# Patient Record
Sex: FEMALE | Race: BLACK OR AFRICAN AMERICAN | Employment: OTHER | URBAN - METROPOLITAN AREA
[De-identification: names, ages, dates, MRNs, and addresses within clinical notes are randomized per-mention and may not be internally consistent; named-entity substitution may affect disease eponyms.]

---

## 2023-03-28 LAB
ALANINE AMINOTRANSFERASE (SGPT) (U/L) IN SER/PLAS: 8 U/L (ref 7–45)
ALBUMIN (G/DL) IN SER/PLAS: 3.7 G/DL (ref 3.4–5)
ALKALINE PHOSPHATASE (U/L) IN SER/PLAS: 74 U/L (ref 33–110)
ANION GAP IN SER/PLAS: 13 MMOL/L (ref 10–20)
ASPARTATE AMINOTRANSFERASE (SGOT) (U/L) IN SER/PLAS: 9 U/L (ref 9–39)
BILIRUBIN TOTAL (MG/DL) IN SER/PLAS: 0.3 MG/DL (ref 0–1.2)
CALCIUM (MG/DL) IN SER/PLAS: 9.1 MG/DL (ref 8.6–10.6)
CARBON DIOXIDE, TOTAL (MMOL/L) IN SER/PLAS: 24 MMOL/L (ref 21–32)
CHLORIDE (MMOL/L) IN SER/PLAS: 99 MMOL/L (ref 98–107)
CREATININE (MG/DL) IN SER/PLAS: 0.51 MG/DL (ref 0.5–1.05)
CREATININE (MG/DL) IN URINE: 179 MG/DL (ref 20–320)
ESTIMATED AVERAGE GLUCOSE FOR HBA1C: 214 MG/DL
GFR FEMALE: >90 ML/MIN/1.73M2
GLUCOSE (MG/DL) IN SER/PLAS: 217 MG/DL (ref 74–99)
HEMOGLOBIN A1C/HEMOGLOBIN TOTAL IN BLOOD: 9.1 %
POTASSIUM (MMOL/L) IN SER/PLAS: 3.9 MMOL/L (ref 3.5–5.3)
PROTEIN (MG/DL) IN URINE: 34 MG/DL (ref 5–24)
PROTEIN TOTAL: 6.7 G/DL (ref 6.4–8.2)
PROTEIN/CREATININE (MG/MG) IN URINE: 0.19 MG/MG CREAT (ref 0–0.17)
SODIUM (MMOL/L) IN SER/PLAS: 132 MMOL/L (ref 136–145)
UREA NITROGEN (MG/DL) IN SER/PLAS: 6 MG/DL (ref 6–23)

## 2023-05-15 ENCOUNTER — HOSPITAL ENCOUNTER (OUTPATIENT)
Dept: DATA CONVERSION | Facility: HOSPITAL | Age: 23
End: 2023-05-15
Attending: OBSTETRICS & GYNECOLOGY

## 2023-05-15 DIAGNOSIS — O99.413 DISEASES OF THE CIRCULATORY SYSTEM COMPLICATING PREGNANCY, THIRD TRIMESTER (HHS-HCC): ICD-10-CM

## 2023-05-15 DIAGNOSIS — F41.9 ANXIETY DISORDER, UNSPECIFIED: ICD-10-CM

## 2023-05-15 DIAGNOSIS — Z3A.28 28 WEEKS GESTATION OF PREGNANCY (HHS-HCC): ICD-10-CM

## 2023-05-15 DIAGNOSIS — Z34.80 ENCOUNTER FOR SUPERVISION OF OTHER NORMAL PREGNANCY, UNSPECIFIED TRIMESTER (HHS-HCC): ICD-10-CM

## 2023-05-15 DIAGNOSIS — O24.013 PRE-EXISTING TYPE 1 DIABETES MELLITUS, IN PREGNANCY, THIRD TRIMESTER (HHS-HCC): ICD-10-CM

## 2023-05-15 DIAGNOSIS — N18.1 CHRONIC KIDNEY DISEASE, STAGE 1: ICD-10-CM

## 2023-05-15 DIAGNOSIS — O99.820 STREPTOCOCCUS B CARRIER STATE COMPLICATING PREGNANCY (HHS-HCC): ICD-10-CM

## 2023-05-15 DIAGNOSIS — R11.2 NAUSEA WITH VOMITING, UNSPECIFIED: ICD-10-CM

## 2023-05-15 DIAGNOSIS — F32.A DEPRESSION, UNSPECIFIED: ICD-10-CM

## 2023-05-15 DIAGNOSIS — E66.9 OBESITY, UNSPECIFIED: ICD-10-CM

## 2023-05-15 DIAGNOSIS — I12.9 HYPERTENSIVE CHRONIC KIDNEY DISEASE WITH STAGE 1 THROUGH STAGE 4 CHRONIC KIDNEY DISEASE, OR UNSPECIFIED CHRONIC KIDNEY DISEASE: ICD-10-CM

## 2023-05-15 DIAGNOSIS — Z79.4 LONG TERM (CURRENT) USE OF INSULIN (MULTI): ICD-10-CM

## 2023-05-15 DIAGNOSIS — O99.343 OTHER MENTAL DISORDERS COMPLICATING PREGNANCY, THIRD TRIMESTER (HHS-HCC): ICD-10-CM

## 2023-05-15 DIAGNOSIS — O99.213 OBESITY COMPLICATING PREGNANCY, THIRD TRIMESTER (HHS-HCC): ICD-10-CM

## 2023-05-15 DIAGNOSIS — E10.22 TYPE 1 DIABETES MELLITUS WITH DIABETIC CHRONIC KIDNEY DISEASE (MULTI): ICD-10-CM

## 2023-05-15 DIAGNOSIS — O26.893 OTHER SPECIFIED PREGNANCY RELATED CONDITIONS, THIRD TRIMESTER (HHS-HCC): ICD-10-CM

## 2023-05-15 LAB
ALANINE AMINOTRANSFERASE (SGPT) (U/L) IN SER/PLAS: 7 U/L (ref 7–45)
ALBUMIN (G/DL) IN SER/PLAS: 3.5 G/DL (ref 3.4–5)
ALKALINE PHOSPHATASE (U/L) IN SER/PLAS: 94 U/L (ref 33–110)
ANION GAP IN SER/PLAS: 14 MMOL/L (ref 10–20)
ASPARTATE AMINOTRANSFERASE (SGOT) (U/L) IN SER/PLAS: 12 U/L (ref 9–39)
BASOPHILS (10*3/UL) IN BLOOD BY AUTOMATED COUNT: 0.03 X10E9/L (ref 0–0.1)
BASOPHILS/100 LEUKOCYTES IN BLOOD BY AUTOMATED COUNT: 0.6 % (ref 0–2)
BETA HYDROXYBUTYRATE (MMOL/L) IN SER/PLAS: 0.12 MMOL/L (ref 0.02–0.27)
BILIRUBIN TOTAL (MG/DL) IN SER/PLAS: 0.3 MG/DL (ref 0–1.2)
CALCIUM (MG/DL) IN SER/PLAS: 9.1 MG/DL (ref 8.6–10.6)
CARBON DIOXIDE, TOTAL (MMOL/L) IN SER/PLAS: 22 MMOL/L (ref 21–32)
CHLORIDE (MMOL/L) IN SER/PLAS: 102 MMOL/L (ref 98–107)
CREATININE (MG/DL) IN SER/PLAS: 0.55 MG/DL (ref 0.5–1.05)
EOSINOPHILS (10*3/UL) IN BLOOD BY AUTOMATED COUNT: 0.09 X10E9/L (ref 0–0.7)
EOSINOPHILS/100 LEUKOCYTES IN BLOOD BY AUTOMATED COUNT: 1.9 % (ref 0–6)
ERYTHROCYTE DISTRIBUTION WIDTH (RATIO) BY AUTOMATED COUNT: 12.4 % (ref 11.5–14.5)
ERYTHROCYTE MEAN CORPUSCULAR HEMOGLOBIN CONCENTRATION (G/DL) BY AUTOMATED: 32.1 G/DL (ref 32–36)
ERYTHROCYTE MEAN CORPUSCULAR VOLUME (FL) BY AUTOMATED COUNT: 79 FL (ref 80–100)
ERYTHROCYTES (10*6/UL) IN BLOOD BY AUTOMATED COUNT: 4.56 X10E12/L (ref 4–5.2)
GFR FEMALE: >90 ML/MIN/1.73M2
GLUCOSE (MG/DL) IN SER/PLAS: 77 MG/DL (ref 74–99)
HEMATOCRIT (%) IN BLOOD BY AUTOMATED COUNT: 36.1 % (ref 36–46)
HEMOGLOBIN (G/DL) IN BLOOD: 11.6 G/DL (ref 12–16)
IMMATURE GRANULOCYTES/100 LEUKOCYTES IN BLOOD BY AUTOMATED COUNT: 0.2 % (ref 0–0.9)
LEUKOCYTES (10*3/UL) IN BLOOD BY AUTOMATED COUNT: 4.8 X10E9/L (ref 4.4–11.3)
LYMPHOCYTES (10*3/UL) IN BLOOD BY AUTOMATED COUNT: 1.11 X10E9/L (ref 1.2–4.8)
LYMPHOCYTES/100 LEUKOCYTES IN BLOOD BY AUTOMATED COUNT: 23.3 % (ref 13–44)
MONOCYTES (10*3/UL) IN BLOOD BY AUTOMATED COUNT: 0.39 X10E9/L (ref 0.1–1)
MONOCYTES/100 LEUKOCYTES IN BLOOD BY AUTOMATED COUNT: 8.2 % (ref 2–10)
NEUTROPHILS (10*3/UL) IN BLOOD BY AUTOMATED COUNT: 3.14 X10E9/L (ref 1.2–7.7)
NEUTROPHILS/100 LEUKOCYTES IN BLOOD BY AUTOMATED COUNT: 65.8 % (ref 40–80)
NRBC (PER 100 WBCS) BY AUTOMATED COUNT: 0 /100 WBC (ref 0–0)
PATIENT TEMPERATURE: 37 DEGREES C
PLATELETS (10*3/UL) IN BLOOD AUTOMATED COUNT: 306 X10E9/L (ref 150–450)
POCT ANION GAP, VENOUS: 9 MMOL/L (ref 10–25)
POCT BASE EXCESS, VENOUS: -1.5 MMOL/L (ref -2–3)
POCT CHLORIDE, VENOUS: 101 MMOL/L (ref 98–107)
POCT GLUCOSE, VENOUS: 78 MG/DL (ref 74–99)
POCT HCO3, VENOUS: 23.7 MMOL/L (ref 22–26)
POCT HEMATOCRIT, VENOUS: 38 % (ref 36–46)
POCT HEMOGLOBIN, VENOUS: 12.5 G/DL (ref 12–16)
POCT IONIZED CALCIUM, VENOUS: 1.2 MMOL/L (ref 1.1–1.33)
POCT LACTATE, VENOUS: 1.1 MMOL/L (ref 0.4–2)
POCT OXY HEMOGLOBIN, VENOUS: 63.6 % (ref 45–75)
POCT PCO2, VENOUS: 41 MMHG (ref 41–51)
POCT PH, VENOUS: 7.37 (ref 7.33–7.43)
POCT PO2, VENOUS: 37 MMHG (ref 35–45)
POCT POTASSIUM, VENOUS: 3 MMOL/L (ref 3.5–5.3)
POCT SO2, VENOUS: 65 % (ref 45–75)
POCT SODIUM, VENOUS: 131 MMOL/L (ref 136–145)
POTASSIUM (MMOL/L) IN SER/PLAS: 3.5 MMOL/L (ref 3.5–5.3)
PROTEIN TOTAL: 6.6 G/DL (ref 6.4–8.2)
SODIUM (MMOL/L) IN SER/PLAS: 134 MMOL/L (ref 136–145)
UREA NITROGEN (MG/DL) IN SER/PLAS: 6 MG/DL (ref 6–23)

## 2023-06-30 ENCOUNTER — HOSPITAL ENCOUNTER (OUTPATIENT)
Dept: DATA CONVERSION | Facility: HOSPITAL | Age: 23
End: 2023-06-30
Attending: OBSTETRICS & GYNECOLOGY

## 2023-08-18 NOTE — PROCEDURES
TCA treatment #***    Patient laid in dorsal lithotomy  HPV wart(s) identified  TCA applied to HPV wart and base using a q-tip applicator  Petroleum jelly applied to surrounding skin  Patient tolerated the procedure      OBGyn Exam

## 2023-08-26 PROBLEM — G89.29 CHRONIC PAIN OF TOE OF LEFT FOOT: Status: ACTIVE | Noted: 2023-08-26

## 2023-08-26 PROBLEM — E11.9 TYPE 2 DIABETES MELLITUS (MULTI): Status: ACTIVE | Noted: 2023-08-26

## 2023-08-26 PROBLEM — I10 CHRONIC HYPERTENSION: Status: ACTIVE | Noted: 2023-08-26

## 2023-08-26 PROBLEM — E10.9: Status: ACTIVE | Noted: 2023-08-26

## 2023-08-26 PROBLEM — Z34.90 PREGNANCY (HHS-HCC): Status: ACTIVE | Noted: 2023-08-26

## 2023-08-26 PROBLEM — O10.919 HTN IN PREGNANCY, CHRONIC (HHS-HCC): Status: ACTIVE | Noted: 2023-08-26

## 2023-08-26 PROBLEM — L85.3 XEROSIS OF SKIN: Status: ACTIVE | Noted: 2023-08-26

## 2023-08-26 PROBLEM — O24.119: Status: ACTIVE | Noted: 2023-08-26

## 2023-08-26 PROBLEM — M79.674 CHRONIC PAIN OF TOE OF RIGHT FOOT: Status: ACTIVE | Noted: 2023-08-26

## 2023-08-26 PROBLEM — Z59.41 FOOD INSECURITY: Status: ACTIVE | Noted: 2023-08-26

## 2023-08-26 PROBLEM — B35.1 FUNGAL NAIL INFECTION: Status: ACTIVE | Noted: 2023-08-26

## 2023-08-26 PROBLEM — O35.BXX0 ABNORMAL FETAL ECHOCARDIOGRAM AFFECTING ANTEPARTUM CARE OF MOTHER (HHS-HCC): Status: ACTIVE | Noted: 2023-08-26

## 2023-08-26 PROBLEM — G89.29 CHRONIC PAIN OF TOE OF RIGHT FOOT: Status: ACTIVE | Noted: 2023-08-26

## 2023-08-26 PROBLEM — Z79.4: Status: ACTIVE | Noted: 2023-08-26

## 2023-08-26 PROBLEM — M79.675 CHRONIC PAIN OF TOE OF LEFT FOOT: Status: ACTIVE | Noted: 2023-08-26

## 2023-08-26 PROBLEM — O36.5990: Status: ACTIVE | Noted: 2023-08-26

## 2023-08-26 PROBLEM — H52.13 MYOPIA, BILATERAL: Status: ACTIVE | Noted: 2023-08-26

## 2023-08-26 RX ORDER — FLUOXETINE HYDROCHLORIDE 40 MG/1
40 CAPSULE ORAL
COMMUNITY
Start: 2021-09-08 | End: 2023-11-17 | Stop reason: WASHOUT

## 2023-08-26 RX ORDER — UREA 200 MG/G
CREAM TOPICAL AS NEEDED
COMMUNITY
End: 2023-11-17 | Stop reason: WASHOUT

## 2023-08-26 RX ORDER — FLUTICASONE PROPIONATE 50 MCG
SPRAY, SUSPENSION (ML) NASAL
COMMUNITY
Start: 2022-02-09 | End: 2023-11-17 | Stop reason: WASHOUT

## 2023-08-26 RX ORDER — INSULIN GLARGINE 100 [IU]/ML
INJECTION, SOLUTION SUBCUTANEOUS NIGHTLY
COMMUNITY
End: 2023-11-17 | Stop reason: WASHOUT

## 2023-08-26 RX ORDER — INSULIN LISPRO 100 [IU]/ML
INJECTION, SOLUTION INTRAVENOUS; SUBCUTANEOUS
COMMUNITY
End: 2023-11-17 | Stop reason: WASHOUT

## 2023-08-26 RX ORDER — FLASH GLUCOSE SENSOR
KIT MISCELLANEOUS
COMMUNITY
Start: 2023-06-13

## 2023-08-26 RX ORDER — BUSPIRONE HYDROCHLORIDE 10 MG/1
10 TABLET ORAL
COMMUNITY
Start: 2021-04-28 | End: 2023-11-17 | Stop reason: WASHOUT

## 2023-08-26 RX ORDER — INSULIN HUMAN 100 [IU]/ML
INJECTION, SUSPENSION SUBCUTANEOUS
COMMUNITY
Start: 2023-05-22 | End: 2023-11-17 | Stop reason: WASHOUT

## 2023-08-26 RX ORDER — ONDANSETRON 4 MG/1
4 TABLET, FILM COATED ORAL AS NEEDED
COMMUNITY
Start: 2023-05-15 | End: 2023-11-17 | Stop reason: WASHOUT

## 2023-08-26 RX ORDER — INSULIN LISPRO 100 [IU]/ML
INJECTION, SOLUTION INTRAVENOUS; SUBCUTANEOUS
COMMUNITY
Start: 2023-05-22 | End: 2023-11-17 | Stop reason: WASHOUT

## 2023-08-26 RX ORDER — INSULIN GLARGINE 100 [IU]/ML
50 INJECTION, SOLUTION SUBCUTANEOUS DAILY
COMMUNITY
Start: 2022-09-07 | End: 2023-11-17 | Stop reason: WASHOUT

## 2023-08-26 RX ORDER — ISOPROPYL ALCOHOL 70 ML/100ML
SWAB TOPICAL
COMMUNITY
Start: 2023-05-18

## 2023-08-26 RX ORDER — POLYETHYLENE GLYCOL 1450
POWDER (GRAM) MISCELLANEOUS
COMMUNITY
End: 2023-11-17 | Stop reason: WASHOUT

## 2023-08-26 RX ORDER — VALACYCLOVIR HYDROCHLORIDE 1 G/1
1 TABLET, FILM COATED ORAL
COMMUNITY
Start: 2022-12-07

## 2023-08-26 RX ORDER — INSULIN ASPART 100 [IU]/ML
100 INJECTION, SOLUTION INTRAVENOUS; SUBCUTANEOUS
COMMUNITY
Start: 2021-09-02 | End: 2023-11-17 | Stop reason: WASHOUT

## 2023-08-26 RX ORDER — GLUCOSAM/CHON-MSM1/C/MANG/BOSW 500-416.6
TABLET ORAL
COMMUNITY
Start: 2023-01-14 | End: 2023-11-17 | Stop reason: WASHOUT

## 2023-08-26 RX ORDER — IBUPROFEN 800 MG/1
1 TABLET ORAL 3 TIMES DAILY PRN
COMMUNITY
Start: 2023-02-03 | End: 2023-11-25

## 2023-08-26 RX ORDER — LISINOPRIL 20 MG/1
20 TABLET ORAL
COMMUNITY
End: 2023-10-08

## 2023-08-26 RX ORDER — NORGESTIMATE AND ETHINYL ESTRADIOL 0.25-0.035
1 KIT ORAL
COMMUNITY
Start: 2022-02-01 | End: 2023-11-17 | Stop reason: WASHOUT

## 2023-08-26 RX ORDER — ACETAMINOPHEN AND CODEINE PHOSPHATE 300; 30 MG/1; MG/1
1 TABLET ORAL EVERY 4 HOURS PRN
COMMUNITY
Start: 2023-03-20

## 2023-08-26 RX ORDER — PEN NEEDLE, DIABETIC 32GX 5/32"
NEEDLE, DISPOSABLE MISCELLANEOUS
COMMUNITY
Start: 2023-05-24 | End: 2023-11-17 | Stop reason: WASHOUT

## 2023-08-26 RX ORDER — TERCONAZOLE 4 MG/G
1 CREAM VAGINAL
COMMUNITY
Start: 2022-10-15 | End: 2023-11-17 | Stop reason: WASHOUT

## 2023-08-26 RX ORDER — CALCIUM CARBONATE 750 MG/1
TABLET, CHEWABLE ORAL
COMMUNITY
Start: 2022-09-13 | End: 2023-11-17 | Stop reason: WASHOUT

## 2023-08-26 RX ORDER — AMMONIUM LACTATE 12 G/100G
1 LOTION TOPICAL DAILY
COMMUNITY
Start: 2022-02-10

## 2023-08-26 RX ORDER — MELATONIN 3 MG
3 CAPSULE ORAL
COMMUNITY
End: 2023-11-17 | Stop reason: WASHOUT

## 2023-08-26 RX ORDER — FLASH GLUCOSE SCANNING READER
EACH MISCELLANEOUS
COMMUNITY
Start: 2023-01-19

## 2023-08-26 RX ORDER — CALCIUM CITRATE/VITAMIN D3 200MG-6.25
TABLET ORAL
COMMUNITY

## 2023-09-07 VITALS
HEIGHT: 68 IN | BODY MASS INDEX: 31.74 KG/M2 | WEIGHT: 209.44 LBS | DIASTOLIC BLOOD PRESSURE: 81 MMHG | SYSTOLIC BLOOD PRESSURE: 148 MMHG

## 2023-09-14 NOTE — PROGRESS NOTES
Current Stage:   Stage: Triage     OB Dating:   EDC/EGA:  ·  Final EVELIN 01-Aug-2023   ·  EGA 28.6     Subjective Data:   Antepartum:  Vaginal Bleeding: No   Contractions/Abdominal Pain: No   Discharge/Loss of Fluid: No   Fetal Movement: Good   Fevers/Chills: No   Preeclampsia Symptoms: No   Antepartum:    Karen Delacruz is a 23yo  at 28.6wga presenting with chief complaint of nausea and vomiting since last night.  Patient endorses symptoms began without obvious  trigger and she has been throwing up all of her food and drink since.  Patient endorses vomiting is nonbilious, nonbloody and appears watery.  Patient otherwise does not endorse fevers, chills, malaise, flulike symptoms, chest pain, shortness of breath,  diarrhea, recent sick contacts, contractions, vaginal fluid, bleeding, or swelling.  Patient does endorse intermittent headaches (not present currently) on ROS and endorses some midsternal pain with occasional coughs.    Pregnancy notable for:  - T2DM, diagnosed at age 15; previously on victoza; -250 per first prenatal visit; poorly adherent; Hemoglobin A1C >15% 2022, most recently 9.1 (3/2023); currently states that she is on novoin 14 in the morning and novolog 12 with ISS  - Class I obesity, BMI 30  - GBS pos UTI 2022  - Stage I CKD w/microalbuminuria, baseline Cr 0.8, 24 hr urine 985 2022   - cHTN P:Cr 420, no current meds, previously on lisinopril   - Anxiety depression; no longer following with mental health provider but amenable     Ob: G1   Gyn: denies hx of STIs   PMH: as above   PSH: denies   Meds: insulin, PNV   Allergies: NKDA   Family Hx: reviewed, non contributory  Social: denies t/e/d; feels safe; FOB is involved and per patient supportive; works part time at [employer]       Objective Information:    Objective Information:      T   P  R  BP   MAP  SpO2   Value  36.4  94  16  114/74   89  100%  Date/Time 5/15 12:43 5/15 13:04 5/15 12:43 5/15 12:44  5/15  12:44 5/15 13:04  Range  (36.4C - 36.4C )  (94 - 99 )  (16 - 16 )  (114 - 148 )/ (74 - 81 )  (89 - 89 )  (94% - 100% )      Pain reported at 5/15 12:43: 0 = None      Physical Exam:   Constitutional: Well appearing, no apparent distress   Obstetric: FHT: baseline 155bpm, good variability,  + accels, no decels  [place]: quiet   Head/Neck: NCAT   Respiratory/Thorax: Breathing comfortable on room  air.  Clear to auscultation bilaterally.   Cardiovascular: Regular rate and rhythm, S1+ S2,  2+ radial and PT pulses.   Gastrointestinal: Gravid, no tenderness to palpation   Extremities: No appreciable pedal edema   Neurological: Strength and sensation grossly intact   Psychological: Appropriate mood and affect.   Skin: No rashes     Assessment and Plan:   Assessment:    Karen Delacruz is a 23yo  at 28.6wga presenting with chief complaint of nausea and vomiting since last night.  Patient overall well-appearing, vitals within  normal limits and overall impression is most likely related to pregnancy versus gastroenteritis with need to rule out diabetic ketoacidosis. Patient with benign labs and tolerating PO well after zofran.     # Nausea/vomiting at home: morning sickness versus viral gastroenteritis  - successful PO and resolution of symptoms after zofran 4mg IV  - no evidence of DKA, no abdominal pain  - prescribed doxylamine/B6 20mg once a day and 7 day script of zofran to use as needed at home.     # T1DM  - no evidence of DKA  - BGs well controlled per labs here and recent reported history  - continue diabetes care per [facility] recs, currently 14/10 novolin and 12 novolog with meals plus ISS novolog    # Maternal well-being  - continue routine prenatal care  - Follow-up with [facility] for continued management of T1DM, has missed multiple appointments,  notified to set-up follow-up appointment      Patient seen and discussed with Dr. Soumya Saini MD, PhD  Emergency Medicine PGY1        Attestation:    Note Completion:  I am a:  Resident/Fellow   Attending Attestation I saw and evaluated the patient.  I personally obtained the key and critical portions of the history and physical exam or was physically present for key and  critical portions performed by the resident/fellow. I reviewed the resident/fellow?s documentation and discussed the patient with the resident/fellow.  I agree with the resident/fellow?s medical decision making as documented in the note.     I personally evaluated the patient on 15-May-2023         Electronic Signatures:  Charis Dooley)  (Signed 16-May-2023 20:52)   Authored: Note Completion   Co-Signer: Assessment and Plan, Note Completion  Parker Saini (Resident))  (Signed 15-May-2023 15:20)   Authored: Current Stage, OB Dating, Subjective Data,  Objective Data, Assessment and Plan, Note Completion      Last Updated: 16-May-2023 20:52 by Charis Dooley)

## 2023-09-30 NOTE — PROGRESS NOTES
Current Stage:   Stage: Triage     OB Dating:   EDC/EGA:  ·  Final EVELIN 01-Aug-2023   ·  EGA 28.6     Subjective Data:   Antepartum:  Vaginal Bleeding: No   Contractions/Abdominal Pain: No   Discharge/Loss of Fluid: No   Fetal Movement: Good   Fevers/Chills: No   Preeclampsia Symptoms: No   Antepartum:    Karen Delacruz is a 23yo  at 28.6wga presenting with chief complaint of nausea and vomiting since last night.  Patient endorses symptoms began without obvious  trigger and she has been throwing up all of her food and drink since.  Patient endorses vomiting is nonbilious, nonbloody and appears watery.  Patient otherwise does not endorse fevers, chills, malaise, flulike symptoms, chest pain, shortness of breath,  diarrhea, recent sick contacts, contractions, vaginal fluid, bleeding, or swelling.  Patient does endorse intermittent headaches (not present currently) on ROS and endorses some midsternal pain with occasional coughs.    Pregnancy notable for:  - T2DM, diagnosed at age 15; previously on victoza; -250 per first prenatal visit; poorly adherent; Hemoglobin A1C >15% 2022, most recently 9.1 (3/2023); currently states that she is on novoin 14 in the morning and novolog 12 with ISS  - Class I obesity, BMI 30  - GBS pos UTI 2022  - Stage I CKD w/microalbuminuria, baseline Cr 0.8, 24 hr urine 985 2022   - cHTN P:Cr 420, no current meds, previously on lisinopril   - Anxiety depression; no longer following with mental health provider but amenable     Ob: G1   Gyn: denies hx of STIs   PMH: as above   PSH: denies   Meds: insulin, PNV   Allergies: NKDA   Family Hx: reviewed, non contributory  Social: denies t/e/d; feels safe; FOB is involved and per patient supportive; works part time at [employer]       Objective Information:    Objective Information:      T   P  R  BP   MAP  SpO2   Value  36.4  94  16  114/74   89  100%  Date/Time 5/15 12:43 5/15 13:04 5/15 12:43 5/15 12:44  5/15  12:44 5/15 13:04  Range  (36.4C - 36.4C )  (94 - 99 )  (16 - 16 )  (114 - 148 )/ (74 - 81 )  (89 - 89 )  (94% - 100% )      Pain reported at 5/15 12:43: 0 = None      Physical Exam:   Constitutional: Well appearing, no apparent distress   Obstetric: FHT: baseline 155bpm, good variability,  + accels, no decels  [place]: quiet   Head/Neck: NCAT   Respiratory/Thorax: Breathing comfortable on room  air.  Clear to auscultation bilaterally.   Cardiovascular: Regular rate and rhythm, S1+ S2,  2+ radial and PT pulses.   Gastrointestinal: Gravid, no tenderness to palpation   Extremities: No appreciable pedal edema   Neurological: Strength and sensation grossly intact   Psychological: Appropriate mood and affect.   Skin: No rashes     Assessment and Plan:   Assessment:    Karen Delacruz is a 23yo  at 28.6wga presenting with chief complaint of nausea and vomiting since last night.  Patient overall well-appearing, vitals within  normal limits and overall impression is most likely related to pregnancy versus gastroenteritis with need to rule out diabetic ketoacidosis. Patient with benign labs and tolerating PO well after zofran.     # Nausea/vomiting at home: morning sickness versus viral gastroenteritis  - successful PO and resolution of symptoms after zofran 4mg IV  - no evidence of DKA, no abdominal pain  - prescribed doxylamine/B6 20mg once a day and 7 day script of zofran to use as needed at home.     # T1DM  - no evidence of DKA  - BGs well controlled per labs here and recent reported history  - continue diabetes care per [facility] recs, currently 14/10 novolin and 12 novolog with meals plus ISS novolog    # Maternal well-being  - continue routine prenatal care  - Follow-up with [facility] for continued management of T1DM, has missed multiple appointments,  notified to set-up follow-up appointment      Patient seen and discussed with Dr. Soumya Saini MD, PhD  Emergency Medicine PGY1        Attestation:    Note Completion:  I am a:  Resident/Fellow   Attending Attestation I saw and evaluated the patient.  I personally obtained the key and critical portions of the history and physical exam or was physically present for key and  critical portions performed by the resident/fellow. I reviewed the resident/fellow?s documentation and discussed the patient with the resident/fellow.  I agree with the resident/fellow?s medical decision making as documented in the note.     I personally evaluated the patient on 15-May-2023         Electronic Signatures:  Charis Dooley)  (Signed 16-May-2023 20:52)   Authored: Note Completion   Co-Signer: Assessment and Plan, Note Completion  Parker Saini (Resident))  (Signed 15-May-2023 15:20)   Authored: Current Stage, OB Dating, Subjective Data,  Objective Data, Assessment and Plan, Note Completion      Last Updated: 16-May-2023 20:52 by Charis Dooley)

## 2023-10-08 ENCOUNTER — HOSPITAL ENCOUNTER (EMERGENCY)
Facility: HOSPITAL | Age: 23
Discharge: HOME | End: 2023-10-08
Attending: EMERGENCY MEDICINE
Payer: MEDICARE

## 2023-10-08 VITALS
SYSTOLIC BLOOD PRESSURE: 132 MMHG | TEMPERATURE: 98.2 F | HEART RATE: 88 BPM | DIASTOLIC BLOOD PRESSURE: 80 MMHG | WEIGHT: 210 LBS | RESPIRATION RATE: 16 BRPM | OXYGEN SATURATION: 97 % | BODY MASS INDEX: 31.83 KG/M2 | HEIGHT: 68 IN

## 2023-10-08 DIAGNOSIS — E10.9 TYPE 1 DIABETES MELLITUS WITHOUT COMPLICATION (MULTI): ICD-10-CM

## 2023-10-08 DIAGNOSIS — I10 HYPERTENSION, UNSPECIFIED TYPE: Primary | ICD-10-CM

## 2023-10-08 LAB
ALBUMIN SERPL BCP-MCNC: 4.1 G/DL (ref 3.4–5)
ALP SERPL-CCNC: 105 U/L (ref 33–110)
ALT SERPL W P-5'-P-CCNC: 22 U/L (ref 7–45)
ANION GAP SERPL CALC-SCNC: 14 MMOL/L (ref 10–20)
APPEARANCE UR: ABNORMAL
AST SERPL W P-5'-P-CCNC: 18 U/L (ref 9–39)
B-OH-BUTYR SERPL-SCNC: 0.13 MMOL/L (ref 0.02–0.27)
BASOPHILS # BLD AUTO: 0.04 X10*3/UL (ref 0–0.1)
BASOPHILS NFR BLD AUTO: 0.6 %
BILIRUB SERPL-MCNC: 0.3 MG/DL (ref 0–1.2)
BILIRUB UR STRIP.AUTO-MCNC: NEGATIVE MG/DL
BUN SERPL-MCNC: 11 MG/DL (ref 6–23)
CALCIUM SERPL-MCNC: 9.6 MG/DL (ref 8.6–10.6)
CHLORIDE SERPL-SCNC: 98 MMOL/L (ref 98–107)
CO2 SERPL-SCNC: 25 MMOL/L (ref 21–32)
COLOR UR: YELLOW
CREAT SERPL-MCNC: 0.74 MG/DL (ref 0.5–1.05)
EOSINOPHIL # BLD AUTO: 0.14 X10*3/UL (ref 0–0.7)
EOSINOPHIL NFR BLD AUTO: 2 %
ERYTHROCYTE [DISTWIDTH] IN BLOOD BY AUTOMATED COUNT: 12 % (ref 11.5–14.5)
FLUAV RNA RESP QL NAA+PROBE: NOT DETECTED
FLUBV RNA RESP QL NAA+PROBE: NOT DETECTED
GFR SERPL CREATININE-BSD FRML MDRD: >90 ML/MIN/1.73M*2
GLUCOSE BLD MANUAL STRIP-MCNC: 128 MG/DL (ref 74–99)
GLUCOSE BLD MANUAL STRIP-MCNC: 370 MG/DL (ref 74–99)
GLUCOSE BLD MANUAL STRIP-MCNC: 449 MG/DL (ref 74–99)
GLUCOSE BLD MANUAL STRIP-MCNC: 71 MG/DL (ref 74–99)
GLUCOSE SERPL-MCNC: 458 MG/DL (ref 74–99)
GLUCOSE UR STRIP.AUTO-MCNC: ABNORMAL MG/DL
HCT VFR BLD AUTO: 33.1 % (ref 36–46)
HGB BLD-MCNC: 10.7 G/DL (ref 12–16)
IMM GRANULOCYTES # BLD AUTO: 0.02 X10*3/UL (ref 0–0.7)
IMM GRANULOCYTES NFR BLD AUTO: 0.3 % (ref 0–0.9)
KETONES UR STRIP.AUTO-MCNC: NEGATIVE MG/DL
LEUKOCYTE ESTERASE UR QL STRIP.AUTO: NEGATIVE
LYMPHOCYTES # BLD AUTO: 1.78 X10*3/UL (ref 1.2–4.8)
LYMPHOCYTES NFR BLD AUTO: 25.4 %
MAGNESIUM SERPL-MCNC: 1.63 MG/DL (ref 1.6–2.4)
MCH RBC QN AUTO: 22.9 PG (ref 26–34)
MCHC RBC AUTO-ENTMCNC: 32.3 G/DL (ref 32–36)
MCV RBC AUTO: 71 FL (ref 80–100)
MONOCYTES # BLD AUTO: 0.43 X10*3/UL (ref 0.1–1)
MONOCYTES NFR BLD AUTO: 6.1 %
NEUTROPHILS # BLD AUTO: 4.6 X10*3/UL (ref 1.2–7.7)
NEUTROPHILS NFR BLD AUTO: 65.6 %
NITRITE UR QL STRIP.AUTO: NEGATIVE
NRBC BLD-RTO: 0 /100 WBCS (ref 0–0)
PH UR STRIP.AUTO: 6 [PH]
PLATELET # BLD AUTO: 358 X10*3/UL (ref 150–450)
PMV BLD AUTO: 11.2 FL (ref 7.5–11.5)
POTASSIUM SERPL-SCNC: 3.5 MMOL/L (ref 3.5–5.3)
PROT SERPL-MCNC: 7.6 G/DL (ref 6.4–8.2)
PROT UR STRIP.AUTO-MCNC: NEGATIVE MG/DL
RBC # BLD AUTO: 4.67 X10*6/UL (ref 4–5.2)
RBC # UR STRIP.AUTO: NEGATIVE /UL
S PYO DNA THROAT QL NAA+PROBE: NOT DETECTED
SARS-COV-2 RNA RESP QL NAA+PROBE: NOT DETECTED
SODIUM SERPL-SCNC: 133 MMOL/L (ref 136–145)
SP GR UR STRIP.AUTO: 1.03
UROBILINOGEN UR STRIP.AUTO-MCNC: <2 MG/DL
WBC # BLD AUTO: 7 X10*3/UL (ref 4.4–11.3)

## 2023-10-08 PROCEDURE — 82947 ASSAY GLUCOSE BLOOD QUANT: CPT | Mod: 59

## 2023-10-08 PROCEDURE — 85025 COMPLETE CBC W/AUTO DIFF WBC: CPT

## 2023-10-08 PROCEDURE — 99285 EMERGENCY DEPT VISIT HI MDM: CPT | Performed by: EMERGENCY MEDICINE

## 2023-10-08 PROCEDURE — 2500000002 HC RX 250 W HCPCS SELF ADMINISTERED DRUGS (ALT 637 FOR MEDICARE OP, ALT 636 FOR OP/ED)

## 2023-10-08 PROCEDURE — 2500000001 HC RX 250 WO HCPCS SELF ADMINISTERED DRUGS (ALT 637 FOR MEDICARE OP)

## 2023-10-08 PROCEDURE — 82010 KETONE BODYS QUAN: CPT

## 2023-10-08 PROCEDURE — 96374 THER/PROPH/DIAG INJ IV PUSH: CPT

## 2023-10-08 PROCEDURE — 99284 EMERGENCY DEPT VISIT MOD MDM: CPT | Mod: 25 | Performed by: EMERGENCY MEDICINE

## 2023-10-08 PROCEDURE — 87651 STREP A DNA AMP PROBE: CPT | Performed by: EMERGENCY MEDICINE

## 2023-10-08 PROCEDURE — 87081 CULTURE SCREEN ONLY: CPT

## 2023-10-08 PROCEDURE — 96361 HYDRATE IV INFUSION ADD-ON: CPT

## 2023-10-08 PROCEDURE — 80053 COMPREHEN METABOLIC PANEL: CPT

## 2023-10-08 PROCEDURE — 2580000001 HC RX 258 IV SOLUTIONS

## 2023-10-08 PROCEDURE — 82947 ASSAY GLUCOSE BLOOD QUANT: CPT

## 2023-10-08 PROCEDURE — 81003 URINALYSIS AUTO W/O SCOPE: CPT

## 2023-10-08 PROCEDURE — 83735 ASSAY OF MAGNESIUM: CPT

## 2023-10-08 PROCEDURE — 87635 SARS-COV-2 COVID-19 AMP PRB: CPT

## 2023-10-08 PROCEDURE — 36415 COLL VENOUS BLD VENIPUNCTURE: CPT

## 2023-10-08 RX ORDER — METFORMIN HYDROCHLORIDE 1000 MG/1
1000 TABLET ORAL
Qty: 14 TABLET | Refills: 0 | Status: SHIPPED | OUTPATIENT
Start: 2023-10-08 | End: 2023-10-08 | Stop reason: SDUPTHER

## 2023-10-08 RX ORDER — LISINOPRIL 20 MG/1
20 TABLET ORAL DAILY
Qty: 14 TABLET | Refills: 0 | Status: SHIPPED | OUTPATIENT
Start: 2023-10-08 | End: 2024-05-02 | Stop reason: SDUPTHER

## 2023-10-08 RX ORDER — LISINOPRIL 20 MG/1
20 TABLET ORAL DAILY
Qty: 14 TABLET | Refills: 0 | Status: SHIPPED | OUTPATIENT
Start: 2023-10-08 | End: 2023-10-08 | Stop reason: SDUPTHER

## 2023-10-08 RX ORDER — POTASSIUM CHLORIDE 1.5 G/1.58G
40 POWDER, FOR SOLUTION ORAL 2 TIMES DAILY
Status: DISCONTINUED | OUTPATIENT
Start: 2023-10-08 | End: 2023-10-09 | Stop reason: HOSPADM

## 2023-10-08 RX ORDER — METFORMIN HYDROCHLORIDE 1000 MG/1
1000 TABLET ORAL
Qty: 14 TABLET | Refills: 0 | Status: SHIPPED | OUTPATIENT
Start: 2023-10-08 | End: 2024-05-02 | Stop reason: SDUPTHER

## 2023-10-08 RX ADMIN — INSULIN HUMAN 10 UNITS: 100 INJECTION, SOLUTION PARENTERAL at 21:37

## 2023-10-08 RX ADMIN — SODIUM CHLORIDE, POTASSIUM CHLORIDE, SODIUM LACTATE AND CALCIUM CHLORIDE 1000 ML: 600; 310; 30; 20 INJECTION, SOLUTION INTRAVENOUS at 19:10

## 2023-10-08 RX ADMIN — POTASSIUM CHLORIDE 40 MEQ: 1.5 POWDER, FOR SOLUTION ORAL at 21:37

## 2023-10-08 ASSESSMENT — PAIN DESCRIPTION - LOCATION: LOCATION: VAGINA

## 2023-10-08 ASSESSMENT — PAIN DESCRIPTION - DESCRIPTORS: DESCRIPTORS: ITCHING

## 2023-10-08 ASSESSMENT — LIFESTYLE VARIABLES
HAVE PEOPLE ANNOYED YOU BY CRITICIZING YOUR DRINKING: NO
EVER HAD A DRINK FIRST THING IN THE MORNING TO STEADY YOUR NERVES TO GET RID OF A HANGOVER: NO
HAVE YOU EVER FELT YOU SHOULD CUT DOWN ON YOUR DRINKING: NO
EVER FELT BAD OR GUILTY ABOUT YOUR DRINKING: NO

## 2023-10-08 ASSESSMENT — PAIN SCALES - GENERAL: PAINLEVEL_OUTOF10: 1

## 2023-10-08 ASSESSMENT — COLUMBIA-SUICIDE SEVERITY RATING SCALE - C-SSRS
2. HAVE YOU ACTUALLY HAD ANY THOUGHTS OF KILLING YOURSELF?: NO
1. IN THE PAST MONTH, HAVE YOU WISHED YOU WERE DEAD OR WISHED YOU COULD GO TO SLEEP AND NOT WAKE UP?: NO
6. HAVE YOU EVER DONE ANYTHING, STARTED TO DO ANYTHING, OR PREPARED TO DO ANYTHING TO END YOUR LIFE?: NO

## 2023-10-08 ASSESSMENT — PAIN - FUNCTIONAL ASSESSMENT: PAIN_FUNCTIONAL_ASSESSMENT: 0-10

## 2023-10-08 NOTE — ED PROVIDER NOTES
HPI   Chief Complaint   Patient presents with    Urinary Frequency     FREQUENCY, BURNING AND ITCHING       Patient is a 23-year-old female with a past medical history of T1DM on metformin, hypertension, and 3 months postpartum who presented to the ED for polyuria and dysuria, and mild hematuria.  Patient noted that she has had polyuria, dysuria over the past 2 days.  She notes that her symptoms are typical for when she has a UTI.  Patient noted that she has been on metformin since her pregnancy.  Patient noted that she was previously on insulin but was discontinued given that she had low blood sugars during pregnancy.  Patient is also noting a sore throat over the past day.  Patient noted that she ran out of her metformin today.  Patient also that she has been off her home lisinopril over the past 2 days.  Denied sick contacts, hemoptysis, fevers, chills, nausea, vomiting, headache, chest pain, difficulty breathing, abdominal pain, and abnormal bowel movements.                          No data recorded                Patient History   No past medical history on file.  No past surgical history on file.  Family History   Problem Relation Name Age of Onset    No Known Problems Other FAM HX      Social History     Tobacco Use    Smoking status: Not on file    Smokeless tobacco: Not on file   Substance Use Topics    Alcohol use: Not on file    Drug use: Not on file       Physical Exam   ED Triage Vitals [10/08/23 1718]   Temp Heart Rate Resp BP   36.8 °C (98.2 °F) 105 18 128/78      SpO2 Temp src Heart Rate Source Patient Position   96 % -- -- --      BP Location FiO2 (%)     -- --       Physical Exam  Vitals and nursing note reviewed.   Constitutional:       General: She is not in acute distress.  HENT:      Head: Normocephalic and atraumatic.      Nose: Nose normal.      Mouth/Throat:      Mouth: Mucous membranes are moist.      Comments: No tonsillar exudate or erythema.  Eyes:      Conjunctiva/sclera: Conjunctivae  normal.   Neck:      Comments: Right cervical lymphadenopathy.No mandibular edema.  Cardiovascular:      Rate and Rhythm: Normal rate and regular rhythm.   Pulmonary:      Effort: Pulmonary effort is normal.      Breath sounds: Normal breath sounds.   Abdominal:      Palpations: Abdomen is soft.      Tenderness: There is no abdominal tenderness.   Musculoskeletal:         General: Normal range of motion.      Cervical back: Normal range of motion and neck supple.   Skin:     General: Skin is warm.      Capillary Refill: Capillary refill takes less than 2 seconds.   Neurological:      General: No focal deficit present.      Mental Status: She is alert.     ED Course & MDM   Diagnoses as of 10/08/23 2320   Hypertension, unspecified type   Type 1 diabetes mellitus without complication (CMS/[facility])       Medical Decision Making  Patient is a 23-year-old female with a past medical history of T1DM on metformin, hypertension, and 3 months postpartum who presented to the ED for polyuria and dysuria, and mild hematuria.  Patient is HDS.  Patient initially noted to be tachycardic at 105 with reevaluation with her heart rate 88.  Heart rate was noted to be 77 EKG.  Point-of-care glucose noted to be significantly elevated at 449.  UA displayed glucose urea, no ketones, and no findings concerning for UTI.  Patient's urinary symptoms are most likely attributed to glucosuria.  He has full panel did not display acidosis and read displayed a significant elevated glucose of 508.  CBC displayed no leukocytosis and chronic anemia.  Beta hydroxybutyrate was normal.  Patient was noted to have mild hyponatremia 133 with remainder of electrolytes within normal limits.  Glucose was noted to be elevated at 458 on CMP.  Patient ordered potassium.  Patient received a bolus of LR.  Viral testing was negative.  Group A strep testing was negative.  Repeat point-of-care glucose after the bolus of LR was 370.  Patient received 10 units of regular  insulin with a repeat point-of-care glucose was 71.  Patient tolerated p.o. ginger ale and was noted to have a repeat point-of-care glucose of 128.  Patient noted that she has been diagnosed with hypertension and has been taking her lisinopril which she previously ran out of.  Patient prescribed metformin and lisinopril.  Discussed ED findings, plan for outpatient follow-up with primary care and endocrinology, and should return precautions for any new or worsening symptoms.  Patient stated understanding and agreement the plan.  All questions were answered.  Patient discharged in stable condition.    Amount and/or Complexity of Data Reviewed  External Data Reviewed: labs, radiology, ECG and notes.  Labs: ordered. Decision-making details documented in ED Course.  ECG/medicine tests: ordered and independent interpretation performed. Decision-making details documented in ED Course.     Details: Rate is 77, sinus rhythm, normal axis, no interval prolongation, no st elevation or depression.  No previous EKG for comparison.        Procedure  Procedures     Juan Menezes MD  Resident  10/10/23 9008

## 2023-10-09 ENCOUNTER — HOSPITAL ENCOUNTER (OUTPATIENT)
Dept: CARDIOLOGY | Facility: HOSPITAL | Age: 23
Discharge: HOME | End: 2023-10-09
Payer: MEDICARE

## 2023-10-09 LAB
ANION GAP BLDV CALCULATED.4IONS-SCNC: 13 MMOL/L (ref 10–25)
ATRIAL RATE: 77 BPM
BASE EXCESS BLDV CALC-SCNC: -0.5 MMOL/L (ref -2–3)
BODY TEMPERATURE: 37 DEGREES CELSIUS
CA-I BLDV-SCNC: 1.27 MMOL/L (ref 1.1–1.33)
CHLORIDE BLDV-SCNC: 97 MMOL/L (ref 98–107)
GLUCOSE BLDV-MCNC: 508 MG/DL (ref 74–99)
HCO3 BLDV-SCNC: 25.4 MMOL/L (ref 22–26)
HCT VFR BLD EST: 34 % (ref 36–46)
HGB BLDV-MCNC: 11.4 G/DL (ref 12–16)
INHALED O2 CONCENTRATION: 21 %
LACTATE BLDV-SCNC: 1 MMOL/L (ref 0.4–2)
OXYHGB MFR BLDV: 56.9 % (ref 45–75)
P AXIS: 47 DEGREES
P OFFSET: 208 MS
P ONSET: 156 MS
PCO2 BLDV: 46 MM HG (ref 41–51)
PH BLDV: 7.35 PH (ref 7.33–7.43)
PO2 BLDV: 40 MM HG (ref 35–45)
POTASSIUM BLDV-SCNC: 3.6 MMOL/L (ref 3.5–5.3)
PR INTERVAL: 140 MS
Q ONSET: 226 MS
QRS COUNT: 12 BEATS
QRS DURATION: 92 MS
QT INTERVAL: 398 MS
QTC CALCULATION(BAZETT): 450 MS
QTC FREDERICIA: 432 MS
R AXIS: 24 DEGREES
SAO2 % BLDV: 58 % (ref 45–75)
SODIUM BLDV-SCNC: 132 MMOL/L (ref 136–145)
T AXIS: 14 DEGREES
T OFFSET: 425 MS
VENTRICULAR RATE: 77 BPM

## 2023-10-09 PROCEDURE — 93005 ELECTROCARDIOGRAM TRACING: CPT

## 2023-10-09 NOTE — DISCHARGE INSTRUCTIONS
You are noted to have have significant hyperglycemia in the emergency department.  You received fluids and insulin.  Follow-up with primary care in 2 to 3 days.  Referrals were placed for primary care and endocrinology.  Please return to the emergency department for any new or worsening symptoms.

## 2023-10-10 LAB — S PYO THROAT QL CULT: NORMAL

## 2023-11-17 ENCOUNTER — APPOINTMENT (OUTPATIENT)
Dept: OBSTETRICS AND GYNECOLOGY | Facility: CLINIC | Age: 23
End: 2023-11-17
Payer: MEDICARE

## 2023-11-17 ENCOUNTER — CLINICAL SUPPORT (OUTPATIENT)
Dept: OBSTETRICS AND GYNECOLOGY | Facility: CLINIC | Age: 23
End: 2023-11-17
Payer: MEDICARE

## 2023-11-17 VITALS
DIASTOLIC BLOOD PRESSURE: 70 MMHG | HEART RATE: 76 BPM | WEIGHT: 200 LBS | SYSTOLIC BLOOD PRESSURE: 107 MMHG | BODY MASS INDEX: 30.41 KG/M2

## 2023-11-17 DIAGNOSIS — N93.9 ABNORMAL VAGINAL BLEEDING: Primary | ICD-10-CM

## 2023-11-17 DIAGNOSIS — E10.9 CONTROLLED TYPE 1 DIABETES MELLITUS WITHOUT COMPLICATION (MULTI): ICD-10-CM

## 2023-11-17 LAB — PREGNANCY TEST URINE, POC: NEGATIVE

## 2023-11-17 ASSESSMENT — PAIN SCALES - GENERAL: PAINLEVEL: 0-NO PAIN

## 2023-11-17 NOTE — PROGRESS NOTES
Patient here for pregnancy test  LMP 9/15/23  Has not been using any birth control.  UCG negative today  History of Diabetes on Metformin - last ordered by ER , does not have PCP  Reviewed with NADIA LUNA   Discussed need Birth control GYN visit, PCP referral  Verbalizes understanding

## 2023-11-25 ENCOUNTER — HOSPITAL ENCOUNTER (EMERGENCY)
Facility: HOSPITAL | Age: 23
Discharge: HOME | End: 2023-11-25
Payer: MEDICARE

## 2023-11-25 VITALS
BODY MASS INDEX: 31.07 KG/M2 | DIASTOLIC BLOOD PRESSURE: 81 MMHG | TEMPERATURE: 97.6 F | RESPIRATION RATE: 16 BRPM | SYSTOLIC BLOOD PRESSURE: 133 MMHG | HEIGHT: 68 IN | OXYGEN SATURATION: 100 % | WEIGHT: 205 LBS | HEART RATE: 80 BPM

## 2023-11-25 DIAGNOSIS — L02.91 ABSCESS: Primary | ICD-10-CM

## 2023-11-25 LAB
ALBUMIN SERPL BCP-MCNC: 3.8 G/DL (ref 3.4–5)
ALP SERPL-CCNC: 106 U/L (ref 33–110)
ALT SERPL W P-5'-P-CCNC: 12 U/L (ref 7–45)
ANION GAP SERPL CALC-SCNC: 18 MMOL/L (ref 10–20)
AST SERPL W P-5'-P-CCNC: 12 U/L (ref 9–39)
BILIRUB SERPL-MCNC: 0.4 MG/DL (ref 0–1.2)
BUN SERPL-MCNC: 10 MG/DL (ref 6–23)
CALCIUM SERPL-MCNC: 9.8 MG/DL (ref 8.6–10.6)
CHLORIDE SERPL-SCNC: 97 MMOL/L (ref 98–107)
CO2 SERPL-SCNC: 26 MMOL/L (ref 21–32)
CREAT SERPL-MCNC: 0.64 MG/DL (ref 0.5–1.05)
ERYTHROCYTE [DISTWIDTH] IN BLOOD BY AUTOMATED COUNT: 12.9 % (ref 11.5–14.5)
GFR SERPL CREATININE-BSD FRML MDRD: >90 ML/MIN/1.73M*2
GLUCOSE SERPL-MCNC: 219 MG/DL (ref 74–99)
HCT VFR BLD AUTO: 32.9 % (ref 36–46)
HGB BLD-MCNC: 10.6 G/DL (ref 12–16)
HOLD SPECIMEN: NORMAL
HOLD SPECIMEN: NORMAL
MCH RBC QN AUTO: 22.3 PG (ref 26–34)
MCHC RBC AUTO-ENTMCNC: 32.2 G/DL (ref 32–36)
MCV RBC AUTO: 69 FL (ref 80–100)
NRBC BLD-RTO: 0 /100 WBCS (ref 0–0)
PLATELET # BLD AUTO: 394 X10*3/UL (ref 150–450)
POTASSIUM SERPL-SCNC: 3 MMOL/L (ref 3.5–5.3)
PROT SERPL-MCNC: 8.4 G/DL (ref 6.4–8.2)
RBC # BLD AUTO: 4.76 X10*6/UL (ref 4–5.2)
SODIUM SERPL-SCNC: 138 MMOL/L (ref 136–145)
WBC # BLD AUTO: 9.7 X10*3/UL (ref 4.4–11.3)

## 2023-11-25 PROCEDURE — 99284 EMERGENCY DEPT VISIT MOD MDM: CPT

## 2023-11-25 PROCEDURE — 80053 COMPREHEN METABOLIC PANEL: CPT

## 2023-11-25 PROCEDURE — 85027 COMPLETE CBC AUTOMATED: CPT

## 2023-11-25 PROCEDURE — 36415 COLL VENOUS BLD VENIPUNCTURE: CPT

## 2023-11-25 PROCEDURE — 2500000004 HC RX 250 GENERAL PHARMACY W/ HCPCS (ALT 636 FOR OP/ED)

## 2023-11-25 PROCEDURE — 99283 EMERGENCY DEPT VISIT LOW MDM: CPT

## 2023-11-25 RX ORDER — IBUPROFEN 600 MG/1
600 TABLET ORAL EVERY 8 HOURS PRN
Qty: 30 TABLET | Refills: 0 | Status: SHIPPED | OUTPATIENT
Start: 2023-11-25 | End: 2024-05-02 | Stop reason: WASHOUT

## 2023-11-25 RX ORDER — POTASSIUM CHLORIDE 20 MEQ/1
60 TABLET, EXTENDED RELEASE ORAL ONCE
Status: COMPLETED | OUTPATIENT
Start: 2023-11-25 | End: 2023-11-25

## 2023-11-25 RX ORDER — ACETAMINOPHEN 325 MG/1
650 TABLET ORAL EVERY 6 HOURS PRN
Qty: 30 TABLET | Refills: 0 | Status: SHIPPED | OUTPATIENT
Start: 2023-11-25

## 2023-11-25 RX ORDER — SULFAMETHOXAZOLE AND TRIMETHOPRIM 800; 160 MG/1; MG/1
1 TABLET ORAL ONCE
Status: COMPLETED | OUTPATIENT
Start: 2023-11-25 | End: 2023-11-25

## 2023-11-25 RX ORDER — SULFAMETHOXAZOLE AND TRIMETHOPRIM 800; 160 MG/1; MG/1
1 TABLET ORAL 2 TIMES DAILY
Qty: 28 TABLET | Refills: 0 | Status: SHIPPED | OUTPATIENT
Start: 2023-11-25 | End: 2023-12-09

## 2023-11-25 RX ADMIN — POTASSIUM CHLORIDE 60 MEQ: 1500 TABLET, EXTENDED RELEASE ORAL at 18:18

## 2023-11-25 RX ADMIN — SULFAMETHOXAZOLE AND TRIMETHOPRIM 1 TABLET: 800; 160 TABLET ORAL at 17:50

## 2023-11-25 ASSESSMENT — PAIN SCALES - GENERAL: PAINLEVEL_OUTOF10: 0 - NO PAIN

## 2023-11-25 ASSESSMENT — PAIN - FUNCTIONAL ASSESSMENT: PAIN_FUNCTIONAL_ASSESSMENT: 0-10

## 2023-11-25 NOTE — ED TRIAGE NOTES
Pt states that she had an abscess on her buttock that popped; pt is concerned about the drainage that came out of it.  Pt also states that she has not been feeling well in general.

## 2023-11-25 NOTE — ED PROVIDER NOTES
HPI   Chief Complaint   Patient presents with    Abscess       Limitations to History: None    HPI: This is a 23-year-old female with a past medical history of insulin-dependent diabetes, hypertension who presents to the emergency room with complaints of an abscess.  Patient states that the abscess is in the gluteal fold.  Patient has had this abscess for multiple days now.  Patient states that it popped earlier today and started draining.  Patient states that she feels off but otherwise does not have any other complaints.  Patient denies any constitutional symptoms such as fevers, chills, cough, nasal congestion.  Patient denies any nausea, vomiting, abdominal pain, change in bowel habits.  Patient denies any polydipsia or polyuria.    Additional History Obtained from: None    12 point review of systems was performed and is negative unless otherwise specified    ------------------------------------------------------------------------------------------------------------------------------------------  Physical Exam:    VS: As documented in the triage note and EMR flowsheet from this visit were reviewed.    CONSTITUTIONAL: Well appearing, well nourished, awake, alert, oriented to person, place, time/situation and in no apparent distress.   EYES: Clear bilaterally, pupils equal, round and reactive to light.   CARDIOVASCULAR: Normal rate, regular rhythm.  Heart sounds S1, S2.  No murmurs, rubs or gallops.  RESPIRATORY: Breath sounds clear and equal bilaterally. No wheezes or rhonchi.   GASTROINTESTINAL: Abdomen soft, non-distended, no rebound, no guarding.   MUSCULOSKELETAL: Spine appears normal, range of motion is not limited, no muscle or joint tenderness.   NEUROLOGICAL: Alert and oriented, no focal deficits, no motor or sensory deficits.   SKIN: Skin normal color for race, warm, dry and intact. No evidence of trauma.  There is a draining abscess on the left gluteal fold of the left buttock.  There is serosanguineous  fluid draining from this abscess.  There is mild induration without current fluctuance.  No surrounding warmth or erythema.  PSYCHIATRIC: Alert and oriented to person, place, time/situation. normal mood and affect. No apparent risk to self or others.     ------------------------------------------------------------------------------------------------------------------------------------------    Medical Decision Making:    This is a 23-year-old female who presents to the emergency room with an already draining abscess in the left side of the gluteal fold.  Patient remained stable throughout course of care.  Given that the abscess is already draining, and incision and drainage is not necessary at this time.  Patient was administered 1 dose of Bactrim.  CBC and CMP were ordered.  CBC was significant for hemoglobin of 10.6.  CMP was significant for a potassium of 3.0 and a glucose of 219.  Patient was administered 60 jad equivalents of oral potassium.  Patient will be discharged home with a prescription for Bactrim.  Patient will also be discharged home with prescription for ibuprofen and Tylenol to use as needed for pain management.  Patient was given strict return precautions.  Patient was agreeable to this plan had no further questions.  I did provide patient with the number for the [facility] for outpatient follow-up.  Patient will be discharged home.  Patient understands that if symptoms worsen or change in any way, they should return for immediate medical attention.  Patient understands that they should follow-up with their primary care physician within the next week to follow-up for abscess on the buttock.  Patient was stable upon discharge.      External Records Reviewed: I reviewed recent and relevant outside records including: Previous provider notes      Escalation of Care:  Appropriate for outpatient follow-up with primary care provider    Social Determinants Affecting Care:  None    Prescription  Drug Consideration: Ibuprofen, Tylenol, Bactrim      CHERIE Vang PA-C  [facility]  [facility]                            [place] Coma Scale Score: 15                  Patient History   Past Medical History:   Diagnosis Date    Diabetes (CMS/HCC)     HTN (hypertension)      History reviewed. No pertinent surgical history.  Family History   Problem Relation Name Age of Onset    No Known Problems Other FAM HX      Social History     Tobacco Use    Smoking status: Never    Smokeless tobacco: Never   Substance Use Topics    Alcohol use: Never    Drug use: Never       Physical Exam   ED Triage Vitals [11/25/23 1617]   Temp Heart Rate Resp BP   36.4 °C (97.6 °F) 106 16 113/70      SpO2 Temp Source Heart Rate Source Patient Position   99 % Temporal -- --      BP Location FiO2 (%)     -- --       Physical Exam    ED Course & MDM        Medical Decision Making      Procedure  Procedures     Josemanuel Christianson PA-C  11/25/23 5935

## 2024-04-18 ENCOUNTER — OFFICE VISIT (OUTPATIENT)
Dept: PRIMARY CARE | Facility: CLINIC | Age: 24
End: 2024-04-18
Payer: MEDICARE

## 2024-04-18 ENCOUNTER — LAB (OUTPATIENT)
Dept: LAB | Facility: LAB | Age: 24
End: 2024-04-18
Payer: MEDICARE

## 2024-04-18 VITALS
DIASTOLIC BLOOD PRESSURE: 73 MMHG | WEIGHT: 202 LBS | TEMPERATURE: 98 F | SYSTOLIC BLOOD PRESSURE: 110 MMHG | HEART RATE: 97 BPM | BODY MASS INDEX: 30.71 KG/M2

## 2024-04-18 DIAGNOSIS — E10.22 TYPE 1 DIABETES MELLITUS WITH DIABETIC CHRONIC KIDNEY DISEASE, UNSPECIFIED CKD STAGE (MULTI): Primary | ICD-10-CM

## 2024-04-18 DIAGNOSIS — E10.22 TYPE 1 DIABETES MELLITUS WITH DIABETIC CHRONIC KIDNEY DISEASE, UNSPECIFIED CKD STAGE (MULTI): ICD-10-CM

## 2024-04-18 PROCEDURE — 82043 UR ALBUMIN QUANTITATIVE: CPT

## 2024-04-18 PROCEDURE — 84443 ASSAY THYROID STIM HORMONE: CPT

## 2024-04-18 PROCEDURE — 99204 OFFICE O/P NEW MOD 45 MIN: CPT | Performed by: FAMILY MEDICINE

## 2024-04-18 PROCEDURE — 82570 ASSAY OF URINE CREATININE: CPT

## 2024-04-18 PROCEDURE — 3074F SYST BP LT 130 MM HG: CPT | Performed by: FAMILY MEDICINE

## 2024-04-18 PROCEDURE — 80061 LIPID PANEL: CPT

## 2024-04-18 PROCEDURE — 36415 COLL VENOUS BLD VENIPUNCTURE: CPT

## 2024-04-18 PROCEDURE — 83036 HEMOGLOBIN GLYCOSYLATED A1C: CPT

## 2024-04-18 PROCEDURE — 85027 COMPLETE CBC AUTOMATED: CPT

## 2024-04-18 PROCEDURE — 80053 COMPREHEN METABOLIC PANEL: CPT

## 2024-04-18 PROCEDURE — 3078F DIAST BP <80 MM HG: CPT | Performed by: FAMILY MEDICINE

## 2024-04-18 RX ORDER — INSULIN GLARGINE 100 [IU]/ML
23 INJECTION, SOLUTION SUBCUTANEOUS NIGHTLY
Qty: 30 ML | Refills: 1 | Status: SHIPPED | OUTPATIENT
Start: 2024-04-18 | End: 2024-04-25 | Stop reason: SDUPTHER

## 2024-04-18 RX ORDER — INSULIN ASPART 100 [IU]/ML
12 INJECTION, SOLUTION INTRAVENOUS; SUBCUTANEOUS
Qty: 30 ML | Refills: 3 | Status: SHIPPED
Start: 2024-04-18 | End: 2024-05-02 | Stop reason: ALTCHOICE

## 2024-04-18 NOTE — PROGRESS NOTES
Subjective   Patient ID: Xavier Delacruz is a 23 y.o. female who presents for Establish Care.  HPI    The patient is a 22 yo AA Female with a history of DM type I, here for   Establishment of care and for the management of her DM I.  She was on Insulin before her last pregnancy, then started Metformin during her last pregnancy and continued on Metformin after the delivery on 7/1/2023.  She is requesting to be put back on Insulin for a better control of her blood sugar. She is not under the care of an endocrinologist.      A review of system was completed.  All systems were reviewed and were normal, except for the ones that are listed in the HPI.    Objective   Physical Exam  Constitutional:       Appearance: Normal appearance.   HENT:      Head: Normocephalic and atraumatic.      Right Ear: Tympanic membrane, ear canal and external ear normal.      Left Ear: Tympanic membrane, ear canal and external ear normal.      Nose: Nose normal.      Mouth/Throat:      Mouth: Mucous membranes are moist.      Pharynx: Oropharynx is clear.   Eyes:      Extraocular Movements: Extraocular movements intact.      Conjunctiva/sclera: Conjunctivae normal.      Pupils: Pupils are equal, round, and reactive to light.   Cardiovascular:      Rate and Rhythm: Normal rate and regular rhythm.      Pulses: Normal pulses.   Pulmonary:      Effort: Pulmonary effort is normal.      Breath sounds: Normal breath sounds.   Abdominal:      General: Abdomen is flat. Bowel sounds are normal.      Palpations: Abdomen is soft.   Musculoskeletal:         General: Normal range of motion.      Cervical back: Normal range of motion and neck supple.   Skin:     General: Skin is warm.   Neurological:      General: No focal deficit present.      Mental Status: She is alert and oriented to person, place, and time. Mental status is at baseline.   Psychiatric:         Mood and Affect: Mood normal.         Behavior: Behavior normal.         Thought Content:  Thought content normal.         Judgment: Judgment normal.     Assessment/Plan   Problem List Items Addressed This Visit       Type 1 diabetes mellitus with diabetic chronic kidney disease (Multi) - Primary     -Lantus 23 units at bedtime started.  -ISS 12 units before meals and per sliding scale instructions.  -Endocrinologist referral done.   -Blood and urine test ordered today.          Relevant Medications    insulin glargine (Lantus) 100 unit/mL injection    insulin aspart (NovoLOG U-100 Insulin aspart) 100 unit/mL injection    Other Relevant Orders    Referral to Endocrinology    CBC    Comprehensive Metabolic Panel    Hemoglobin A1C    Lipid Panel    TSH with reflex to Free T4 if abnormal    Albumin , Urine Random    Patient to return to office in 1 month.

## 2024-04-18 NOTE — ASSESSMENT & PLAN NOTE
-Lantus 23 units at bedtime started.  -ISS 12 units before meals and per sliding scale instructions.  -Endocrinologist referral done.   -Blood and urine test ordered today.

## 2024-04-19 LAB
ALBUMIN SERPL BCP-MCNC: 3.8 G/DL (ref 3.4–5)
ALP SERPL-CCNC: 100 U/L (ref 33–110)
ALT SERPL W P-5'-P-CCNC: 17 U/L (ref 7–45)
ANION GAP SERPL CALC-SCNC: 12 MMOL/L (ref 10–20)
AST SERPL W P-5'-P-CCNC: 13 U/L (ref 9–39)
BILIRUB SERPL-MCNC: 0.4 MG/DL (ref 0–1.2)
BUN SERPL-MCNC: 11 MG/DL (ref 6–23)
CALCIUM SERPL-MCNC: 9.3 MG/DL (ref 8.6–10.6)
CHLORIDE SERPL-SCNC: 94 MMOL/L (ref 98–107)
CHOLEST SERPL-MCNC: 158 MG/DL (ref 0–199)
CHOLESTEROL/HDL RATIO: 3.6
CO2 SERPL-SCNC: 28 MMOL/L (ref 21–32)
CREAT SERPL-MCNC: 0.72 MG/DL (ref 0.5–1.05)
CREAT UR-MCNC: 25.4 MG/DL (ref 20–320)
EGFRCR SERPLBLD CKD-EPI 2021: >90 ML/MIN/1.73M*2
ERYTHROCYTE [DISTWIDTH] IN BLOOD BY AUTOMATED COUNT: 14.1 % (ref 11.5–14.5)
GLUCOSE SERPL-MCNC: 507 MG/DL (ref 74–99)
HCT VFR BLD AUTO: 37.1 % (ref 36–46)
HDLC SERPL-MCNC: 43.9 MG/DL
HGB BLD-MCNC: 11.2 G/DL (ref 12–16)
LDLC SERPL CALC-MCNC: 58 MG/DL
MCH RBC QN AUTO: 21.5 PG (ref 26–34)
MCHC RBC AUTO-ENTMCNC: 30.2 G/DL (ref 32–36)
MCV RBC AUTO: 71 FL (ref 80–100)
MICROALBUMIN UR-MCNC: 18.6 MG/L
MICROALBUMIN/CREAT UR: 73.2 UG/MG CREAT
NON HDL CHOLESTEROL: 114 MG/DL (ref 0–149)
NRBC BLD-RTO: 0 /100 WBCS (ref 0–0)
PLATELET # BLD AUTO: 336 X10*3/UL (ref 150–450)
POTASSIUM SERPL-SCNC: 4.1 MMOL/L (ref 3.5–5.3)
PROT SERPL-MCNC: 7.2 G/DL (ref 6.4–8.2)
RBC # BLD AUTO: 5.2 X10*6/UL (ref 4–5.2)
SODIUM SERPL-SCNC: 130 MMOL/L (ref 136–145)
TRIGL SERPL-MCNC: 281 MG/DL (ref 0–149)
TSH SERPL-ACNC: 0.77 MIU/L (ref 0.44–3.98)
VLDL: 56 MG/DL (ref 0–40)
WBC # BLD AUTO: 6.3 X10*3/UL (ref 4.4–11.3)

## 2024-04-20 LAB
EST. AVERAGE GLUCOSE BLD GHB EST-MCNC: 427 MG/DL
HBA1C MFR BLD: 16.5 %

## 2024-04-25 DIAGNOSIS — E10.22 TYPE 1 DIABETES MELLITUS WITH DIABETIC CHRONIC KIDNEY DISEASE, UNSPECIFIED CKD STAGE (MULTI): ICD-10-CM

## 2024-04-25 DIAGNOSIS — E10.9 CONTROLLED TYPE 1 DIABETES MELLITUS WITHOUT COMPLICATION (MULTI): Primary | ICD-10-CM

## 2024-04-25 RX ORDER — INSULIN LISPRO 100 [IU]/ML
INJECTION, SOLUTION INTRAVENOUS; SUBCUTANEOUS
COMMUNITY
End: 2024-04-25 | Stop reason: SDUPTHER

## 2024-04-25 RX ORDER — INSULIN GLARGINE 100 [IU]/ML
23 INJECTION, SOLUTION SUBCUTANEOUS NIGHTLY
Qty: 30 ML | Refills: 1 | Status: SHIPPED
Start: 2024-04-25 | End: 2024-05-02 | Stop reason: ENTERED-IN-ERROR

## 2024-04-25 RX ORDER — INSULIN LISPRO 100 [IU]/ML
12 INJECTION, SOLUTION INTRAVENOUS; SUBCUTANEOUS
Qty: 45 ML | Refills: 1 | Status: SHIPPED | OUTPATIENT
Start: 2024-04-25

## 2024-05-02 ENCOUNTER — OFFICE VISIT (OUTPATIENT)
Dept: ENDOCRINOLOGY | Facility: CLINIC | Age: 24
End: 2024-05-02
Payer: MEDICARE

## 2024-05-02 ENCOUNTER — TELEPHONE (OUTPATIENT)
Dept: ENDOCRINOLOGY | Facility: CLINIC | Age: 24
End: 2024-05-02

## 2024-05-02 VITALS
BODY MASS INDEX: 31.86 KG/M2 | TEMPERATURE: 97.8 F | HEIGHT: 67 IN | RESPIRATION RATE: 20 BRPM | SYSTOLIC BLOOD PRESSURE: 133 MMHG | HEART RATE: 86 BPM | WEIGHT: 203 LBS | DIASTOLIC BLOOD PRESSURE: 82 MMHG

## 2024-05-02 DIAGNOSIS — E10.22 TYPE 1 DIABETES MELLITUS WITH DIABETIC CHRONIC KIDNEY DISEASE, UNSPECIFIED CKD STAGE (MULTI): Primary | ICD-10-CM

## 2024-05-02 DIAGNOSIS — E10.65 TYPE 1 DIABETES MELLITUS WITH HYPERGLYCEMIA (MULTI): ICD-10-CM

## 2024-05-02 DIAGNOSIS — I10 CHRONIC HYPERTENSION: ICD-10-CM

## 2024-05-02 DIAGNOSIS — Z91.199 PATIENT NON ADHERENCE: ICD-10-CM

## 2024-05-02 DIAGNOSIS — E66.09 CLASS 1 OBESITY DUE TO EXCESS CALORIES WITH SERIOUS COMORBIDITY AND BODY MASS INDEX (BMI) OF 31.0 TO 31.9 IN ADULT: ICD-10-CM

## 2024-05-02 DIAGNOSIS — I10 HYPERTENSION, UNSPECIFIED TYPE: ICD-10-CM

## 2024-05-02 PROCEDURE — 99204 OFFICE O/P NEW MOD 45 MIN: CPT | Performed by: NURSE PRACTITIONER

## 2024-05-02 PROCEDURE — 3061F NEG MICROALBUMINURIA REV: CPT | Performed by: NURSE PRACTITIONER

## 2024-05-02 PROCEDURE — 95249 CONT GLUC MNTR PT PROV EQP: CPT | Performed by: NURSE PRACTITIONER

## 2024-05-02 PROCEDURE — 4010F ACE/ARB THERAPY RXD/TAKEN: CPT | Performed by: NURSE PRACTITIONER

## 2024-05-02 PROCEDURE — 3008F BODY MASS INDEX DOCD: CPT | Performed by: NURSE PRACTITIONER

## 2024-05-02 PROCEDURE — 3075F SYST BP GE 130 - 139MM HG: CPT | Performed by: NURSE PRACTITIONER

## 2024-05-02 PROCEDURE — 3046F HEMOGLOBIN A1C LEVEL >9.0%: CPT | Performed by: NURSE PRACTITIONER

## 2024-05-02 PROCEDURE — 3079F DIAST BP 80-89 MM HG: CPT | Performed by: NURSE PRACTITIONER

## 2024-05-02 PROCEDURE — 3048F LDL-C <100 MG/DL: CPT | Performed by: NURSE PRACTITIONER

## 2024-05-02 PROCEDURE — 1036F TOBACCO NON-USER: CPT | Performed by: NURSE PRACTITIONER

## 2024-05-02 RX ORDER — BLOOD-GLUCOSE SENSOR
EACH MISCELLANEOUS
Qty: 2 EACH | Refills: 11 | Status: SHIPPED | OUTPATIENT
Start: 2024-05-02

## 2024-05-02 RX ORDER — INSULIN GLARGINE 100 [IU]/ML
INJECTION, SOLUTION SUBCUTANEOUS
Qty: 15 ML | Refills: 11 | Status: SHIPPED | OUTPATIENT
Start: 2024-05-02

## 2024-05-02 RX ORDER — METFORMIN HYDROCHLORIDE 1000 MG/1
1000 TABLET ORAL
Qty: 30 TABLET | Refills: 11 | Status: SHIPPED | OUTPATIENT
Start: 2024-05-02

## 2024-05-02 RX ORDER — LISINOPRIL 20 MG/1
20 TABLET ORAL DAILY
Qty: 30 TABLET | Refills: 11 | Status: SHIPPED | OUTPATIENT
Start: 2024-05-02 | End: 2024-06-04 | Stop reason: SDUPTHER

## 2024-05-02 RX ORDER — PEN NEEDLE, DIABETIC 30 GX3/16"
NEEDLE, DISPOSABLE MISCELLANEOUS
Qty: 200 EACH | Refills: 11 | Status: SHIPPED | OUTPATIENT
Start: 2024-05-02

## 2024-05-02 ASSESSMENT — ENCOUNTER SYMPTOMS
APPETITE CHANGE: 0
DIZZINESS: 0
SHORTNESS OF BREATH: 0
ACTIVITY CHANGE: 0
NAUSEA: 0
WEAKNESS: 0
NERVOUS/ANXIOUS: 0
FREQUENCY: 0
CONSTIPATION: 0
SEIZURES: 0
FATIGUE: 0
PALPITATIONS: 0
DIARRHEA: 0
NUMBNESS: 0
POLYDIPSIA: 0
SLEEP DISTURBANCE: 0
POLYPHAGIA: 0

## 2024-05-02 NOTE — PROGRESS NOTES
Patient is sent at the request of Joselyn Oswald for my opinion regarding Type 1 diabetes.  My final recommendations will be communicated back to the requesting provider by way of shared medical record.    Erin Delacruz is a 23 y.o. female who presents for an initial visit for evaluation of Type 1 diabetes mellitus. The initial diagnosis of diabetes was made age  12-13 .     She delivered a a girly July 1, 2023 at 34 weeks. She is unsure how much she weighed. Baby had 2 week ICU admission.     Known complications due to diabetes included nephropathy and obesity    Cardiovascular risk factors include diabetes mellitus, obesity (BMI >= 30 kg/m2), and sedentary lifestyle. The patient is on an ACE inhibitor or angiotensin II receptor blocker.  The patient has not been previously hospitalized due to diabetic ketoacidosis in last year.     Current diabetes regimen is as follows:   Lantus 22 units before bed  Humalog 12 units before meals  Sliding scale 150-200 2 units  201-250 4 units  251-300 6 units  301-350 8 units  351-400 10 units    The patient is not currently checking the blood glucose    Hypoglycemia frequency: Denies   Hypoglycemia awareness: Yes     Exercise: rarely   Meal panning: She is using no plan.    Review of Systems   Constitutional:  Negative for activity change, appetite change and fatigue.   Respiratory:  Negative for shortness of breath.    Cardiovascular:  Negative for chest pain, palpitations and leg swelling.   Gastrointestinal:  Negative for constipation, diarrhea and nausea.   Endocrine: Negative for cold intolerance, heat intolerance, polydipsia, polyphagia and polyuria.   Genitourinary:  Negative for frequency.   Musculoskeletal:  Negative for gait problem.   Skin:  Negative for rash.   Neurological:  Negative for dizziness, seizures, weakness and numbness.   Psychiatric/Behavioral:  Negative for sleep disturbance and suicidal ideas. The patient is not  "nervous/anxious.        Objective   /82 (BP Location: Left arm, Patient Position: Sitting, BP Cuff Size: Adult)   Pulse 86   Temp 36.6 °C (97.8 °F) (Tympanic)   Resp 20   Ht 1.702 m (5' 7\")   Wt 92.1 kg (203 lb)   BMI 31.79 kg/m²   Physical Exam  Vitals and nursing note reviewed.   Constitutional:       Appearance: She is obese.   HENT:      Head: Atraumatic.   Pulmonary:      Effort: Pulmonary effort is normal.   Skin:     General: Skin is warm.   Neurological:      General: No focal deficit present.      Mental Status: She is alert and oriented to person, place, and time. Mental status is at baseline.      Gait: Gait normal.   Psychiatric:         Mood and Affect: Mood normal.         Behavior: Behavior normal.         Thought Content: Thought content normal.         Judgment: Judgment normal.           Health Maintenance:   Foot Exam: None. Denies issues.   Eye Exam: Has appointment July 2, 2024. Denies retinopathy.   Lipid Panel: Total 158 Trig 281 April 2024  Urine Albumin: Ratio 73.2 April 2024    Assessment/Plan   Diagnoses and all orders for this visit:  Type 1 diabetes mellitus with diabetic chronic kidney disease, unspecified CKD stage (Multi)  -     Referral to Endocrinology  -     pen needle, diabetic (BD Merna 2nd Gen Pen Needle) 32 gauge x 5/32\" needle; Use to inject insulin up to 5 times a day.  -     insulin glargine (Lantus) 100 unit/mL (3 mL) pen; Inject 25 units daily before bed  -     FreeStyle Leobardo 3 Sensor device; Use asn directed to measure glucose in real time changing every 3 days  -     C-peptide; Future  -     AYLIN; Future  -     Islet Antigen-2 Antibody; Future  -     ICA; Future  Chronic hypertension  Class 1 obesity due to excess calories with serious comorbidity and body mass index (BMI) of 31.0 to 31.9 in adult  Type 1 diabetes mellitus with hyperglycemia (Multi)  -     metFORMIN (Glucophage) 1,000 mg tablet; Take 1 tablet (1,000 mg) by mouth once daily with " breakfast.  Hypertension, unspecified type  -     lisinopril 20 mg tablet; Take 1 tablet (20 mg) by mouth once daily.  Patient non adherence    Type 1 diabetes mellitus, is not at goal. A1c >16%.   Her last C-peptide was 0.4. We will draw type 1 labs as we may be able to use GLP1.   We have refilled all medications and discussed treatment options. She has no glucose values to review at today's visit.   RX changes: Filled all prescriptions. Discussed urgency in glucose control.   Restart metformin 1000 mg AM  Continue Lantus 22 units before bed  Humalog 12 units before meals  Sliding scale   150-200 2 units  201-250 4 units  251-300 6 units  301-350 8 units  351-400 10 units  Initiated Leobardo 3 in office today. Instructed on care, use of sensor, when to call company for support, when to use fingerstick monitor. Handout provided.   Education:  interpretation of lab results, blood sugar goals, complications of diabetes mellitus, and hypoglycemia prevention and treatment  Hypertension: Refilled lisinopril. She is using birth control.   Follow up: I recommend diabetes care be 3-4 months.

## 2024-05-02 NOTE — PATIENT INSTRUCTIONS
Type 1 diabetes mellitus, is not at goal. A1c >16%.   Her last C-peptide was 0.4. We will draw type 1 labs as we may be able to use GLP1.   We have refilled all medications and discussed treatment options. She has no glucose values to review at today's visit.   RX changes: Filled all prescriptions. Discussed urgency in glucose control.   Restart metformin 1000 mg AM  Continue Lantus 22 units before bed  Humalog 12 units before meals  Sliding scale   150-200 2 units  201-250 4 units  251-300 6 units  301-350 8 units  351-400 10 units  Initiated Leobardo 3 in office today. Instructed on care, use of sensor, when to call company for support, when to use fingerstick monitor. Handout provided.   Education:  interpretation of lab results, blood sugar goals, complications of diabetes mellitus, and hypoglycemia prevention and treatment  Hypertension: Refilled lisinopril. She is using birth control.   Follow up: I recommend diabetes care be 3-4 months.

## 2024-05-02 NOTE — TELEPHONE ENCOUNTER
Initiated order for sara 3 CGM through [facility] by sending a staff message to our contact there at [facility]. They will pull notes and any other pertinent information. Our contact will then send the form for us via email to sign electronically.

## 2024-05-03 ENCOUNTER — TELEPHONE (OUTPATIENT)
Dept: PRIMARY CARE | Facility: CLINIC | Age: 24
End: 2024-05-03

## 2024-05-27 ENCOUNTER — HOSPITAL ENCOUNTER (EMERGENCY)
Facility: HOSPITAL | Age: 24
Discharge: HOME | End: 2024-05-27
Attending: EMERGENCY MEDICINE
Payer: MEDICARE

## 2024-05-27 ENCOUNTER — APPOINTMENT (OUTPATIENT)
Dept: RADIOLOGY | Facility: HOSPITAL | Age: 24
End: 2024-05-27
Payer: MEDICARE

## 2024-05-27 VITALS
WEIGHT: 203 LBS | DIASTOLIC BLOOD PRESSURE: 68 MMHG | RESPIRATION RATE: 18 BRPM | BODY MASS INDEX: 29.06 KG/M2 | OXYGEN SATURATION: 98 % | TEMPERATURE: 97.9 F | HEIGHT: 70 IN | SYSTOLIC BLOOD PRESSURE: 116 MMHG | HEART RATE: 85 BPM

## 2024-05-27 DIAGNOSIS — M25.512 ACUTE PAIN OF LEFT SHOULDER: ICD-10-CM

## 2024-05-27 DIAGNOSIS — B37.31 VAGINAL YEAST INFECTION: ICD-10-CM

## 2024-05-27 DIAGNOSIS — Z71.1 CONCERN ABOUT STD IN FEMALE WITHOUT DIAGNOSIS: ICD-10-CM

## 2024-05-27 DIAGNOSIS — N76.0 BACTERIAL VAGINOSIS: Primary | ICD-10-CM

## 2024-05-27 DIAGNOSIS — B96.89 BACTERIAL VAGINOSIS: Primary | ICD-10-CM

## 2024-05-27 LAB
APPEARANCE UR: CLEAR
BILIRUB UR STRIP.AUTO-MCNC: NEGATIVE MG/DL
CLUE CELLS SPEC QL WET PREP: PRESENT
COLOR UR: COLORLESS
CRP SERPL-MCNC: 4.34 MG/DL
ERYTHROCYTE [SEDIMENTATION RATE] IN BLOOD BY WESTERGREN METHOD: 57 MM/H (ref 0–20)
GLUCOSE UR STRIP.AUTO-MCNC: ABNORMAL MG/DL
HCV AB SER QL: NONREACTIVE
HIV 1+2 AB+HIV1 P24 AG SERPL QL IA: NONREACTIVE
KETONES UR STRIP.AUTO-MCNC: NEGATIVE MG/DL
LEUKOCYTE ESTERASE UR QL STRIP.AUTO: NEGATIVE
NITRITE UR QL STRIP.AUTO: NEGATIVE
PH UR STRIP.AUTO: 5.5 [PH]
PROT UR STRIP.AUTO-MCNC: NEGATIVE MG/DL
RBC # UR STRIP.AUTO: NEGATIVE /UL
SP GR UR STRIP.AUTO: 1.03
T VAGINALIS SPEC QL WET PREP: ABNORMAL
TRICHOMONAS REFLEX COMMENT: ABNORMAL
UROBILINOGEN UR STRIP.AUTO-MCNC: NORMAL MG/DL
WBC VAG QL WET PREP: ABNORMAL
YEAST VAG QL WET PREP: PRESENT

## 2024-05-27 PROCEDURE — 81003 URINALYSIS AUTO W/O SCOPE: CPT

## 2024-05-27 PROCEDURE — 2500000001 HC RX 250 WO HCPCS SELF ADMINISTERED DRUGS (ALT 637 FOR MEDICARE OP)

## 2024-05-27 PROCEDURE — 87661 TRICHOMONAS VAGINALIS AMPLIF: CPT

## 2024-05-27 PROCEDURE — 36415 COLL VENOUS BLD VENIPUNCTURE: CPT

## 2024-05-27 PROCEDURE — 73030 X-RAY EXAM OF SHOULDER: CPT | Mod: LEFT SIDE | Performed by: STUDENT IN AN ORGANIZED HEALTH CARE EDUCATION/TRAINING PROGRAM

## 2024-05-27 PROCEDURE — 86140 C-REACTIVE PROTEIN: CPT

## 2024-05-27 PROCEDURE — 85652 RBC SED RATE AUTOMATED: CPT

## 2024-05-27 PROCEDURE — 99284 EMERGENCY DEPT VISIT MOD MDM: CPT

## 2024-05-27 PROCEDURE — 73110 X-RAY EXAM OF WRIST: CPT | Mod: LT

## 2024-05-27 PROCEDURE — 87389 HIV-1 AG W/HIV-1&-2 AB AG IA: CPT

## 2024-05-27 PROCEDURE — 86780 TREPONEMA PALLIDUM: CPT

## 2024-05-27 PROCEDURE — 87210 SMEAR WET MOUNT SALINE/INK: CPT | Mod: 59

## 2024-05-27 PROCEDURE — 73110 X-RAY EXAM OF WRIST: CPT | Mod: LEFT SIDE | Performed by: STUDENT IN AN ORGANIZED HEALTH CARE EDUCATION/TRAINING PROGRAM

## 2024-05-27 PROCEDURE — 86803 HEPATITIS C AB TEST: CPT

## 2024-05-27 PROCEDURE — 2500000004 HC RX 250 GENERAL PHARMACY W/ HCPCS (ALT 636 FOR OP/ED)

## 2024-05-27 PROCEDURE — 73030 X-RAY EXAM OF SHOULDER: CPT | Mod: LT

## 2024-05-27 PROCEDURE — 87491 CHLMYD TRACH DNA AMP PROBE: CPT

## 2024-05-27 PROCEDURE — 96372 THER/PROPH/DIAG INJ SC/IM: CPT

## 2024-05-27 RX ORDER — DOXYCYCLINE 100 MG/1
100 CAPSULE ORAL 2 TIMES DAILY
Qty: 14 CAPSULE | Refills: 0 | Status: SHIPPED | OUTPATIENT
Start: 2024-05-27 | End: 2024-06-03

## 2024-05-27 RX ORDER — CEFTRIAXONE 500 MG/1
500 INJECTION, POWDER, FOR SOLUTION INTRAMUSCULAR; INTRAVENOUS ONCE
Status: COMPLETED | OUTPATIENT
Start: 2024-05-27 | End: 2024-05-27

## 2024-05-27 RX ORDER — METRONIDAZOLE 500 MG/1
500 TABLET ORAL 2 TIMES DAILY
Qty: 14 TABLET | Refills: 0 | Status: SHIPPED | OUTPATIENT
Start: 2024-05-27 | End: 2024-06-03

## 2024-05-27 RX ORDER — LIDOCAINE HYDROCHLORIDE 10 MG/ML
INJECTION INFILTRATION; PERINEURAL
Status: DISCONTINUED
Start: 2024-05-27 | End: 2024-05-27 | Stop reason: HOSPADM

## 2024-05-27 RX ORDER — DOXYCYCLINE HYCLATE 100 MG
100 TABLET ORAL ONCE
Status: COMPLETED | OUTPATIENT
Start: 2024-05-27 | End: 2024-05-27

## 2024-05-27 RX ORDER — METRONIDAZOLE 500 MG/1
500 TABLET ORAL ONCE
Status: COMPLETED | OUTPATIENT
Start: 2024-05-27 | End: 2024-05-27

## 2024-05-27 RX ORDER — FLUCONAZOLE 150 MG/1
150 TABLET ORAL ONCE
Status: COMPLETED | OUTPATIENT
Start: 2024-05-27 | End: 2024-05-27

## 2024-05-27 RX ADMIN — DOXYCYCLINE HYCLATE 100 MG: 100 TABLET, COATED ORAL at 19:35

## 2024-05-27 RX ADMIN — FLUCONAZOLE 150 MG: 150 TABLET ORAL at 20:02

## 2024-05-27 RX ADMIN — CEFTRIAXONE 500 MG: 500 INJECTION, POWDER, FOR SOLUTION INTRAMUSCULAR; INTRAVENOUS at 19:35

## 2024-05-27 RX ADMIN — METRONIDAZOLE 500 MG: 500 TABLET ORAL at 20:02

## 2024-05-27 ASSESSMENT — COLUMBIA-SUICIDE SEVERITY RATING SCALE - C-SSRS
1. IN THE PAST MONTH, HAVE YOU WISHED YOU WERE DEAD OR WISHED YOU COULD GO TO SLEEP AND NOT WAKE UP?: NO
2. HAVE YOU ACTUALLY HAD ANY THOUGHTS OF KILLING YOURSELF?: NO
6. HAVE YOU EVER DONE ANYTHING, STARTED TO DO ANYTHING, OR PREPARED TO DO ANYTHING TO END YOUR LIFE?: NO

## 2024-05-27 ASSESSMENT — PAIN - FUNCTIONAL ASSESSMENT: PAIN_FUNCTIONAL_ASSESSMENT: 0-10

## 2024-05-27 ASSESSMENT — PAIN SCALES - GENERAL: PAINLEVEL_OUTOF10: 5 - MODERATE PAIN

## 2024-05-27 NOTE — ED PROVIDER NOTES
HPI   Chief Complaint   Patient presents with    Vaginal Discharge    Wrist Pain       HPI  Xavier Delacruz is a 23-year-old female with type 1 diabetes presenting with left shoulder pain.  The patient states the pain has been constant for the past week and describes it as a sharp pain.  The patient states she has not been able to fully move her left shoulder since the pain started.  The patient states that she woke up and the pain began a week ago.  The patient denies trauma or falls to the area.  The patient also states she has left wrist pain as well.  The patient states yesterday she noticed abnormal white vaginal discharge and states it looks like cottage cheese.  The patient also states she has been having vaginal itchiness.  The patient states that she has concerns for STDs and would like testing done today.  The patient denies chest pain, shortness of breath, abdominal pain, nausea, vomiting, numbness tingling fingers and toes, fevers, body aches, chills.                  [place] Coma Scale Score: 15                     Patient History   Past Medical History:   Diagnosis Date    Diabetes (Multi)     HTN (hypertension)      History reviewed. No pertinent surgical history.  Family History   Problem Relation Name Age of Onset    No Known Problems Other FAM HX      Social History     Tobacco Use    Smoking status: Never    Smokeless tobacco: Never   Substance Use Topics    Alcohol use: Never    Drug use: Never       Physical Exam   ED Triage Vitals [05/27/24 1757]   Temperature Heart Rate Respirations BP   36.6 °C (97.9 °F) 85 18 116/68      Pulse Ox Temp src Heart Rate Source Patient Position   98 % -- -- --      BP Location FiO2 (%)     -- --       Physical Exam  Vitals reviewed.   Constitutional:       Appearance: Normal appearance.   Cardiovascular:      Rate and Rhythm: Normal rate and regular rhythm.      Heart sounds: Normal heart sounds. No murmur heard.     No gallop.   Pulmonary:      Effort: Pulmonary  effort is normal.      Breath sounds: Normal breath sounds. No stridor. No wheezing, rhonchi or rales.   Abdominal:      General: Abdomen is flat. Bowel sounds are normal. There is no distension.      Palpations: Abdomen is soft. There is no mass.      Tenderness: There is no abdominal tenderness. There is no guarding or rebound.      Hernia: No hernia is present.   Genitourinary:     Comments: Patient deferred pelvic exam  Musculoskeletal:      Right shoulder: Normal.      Left shoulder: Tenderness and bony tenderness present. No swelling, deformity, effusion or crepitus. Decreased range of motion. Decreased strength. Normal pulse.      Right wrist: Normal.      Left wrist: Tenderness and bony tenderness (Specifically distal ulna) present. No swelling, deformity, effusion, snuff box tenderness or crepitus. Decreased range of motion. Normal pulse.   Skin:     General: Skin is warm and dry.   Neurological:      Mental Status: She is alert and oriented to person, place, and time.   Psychiatric:         Mood and Affect: Mood normal.         Behavior: Behavior normal.         ED Course & MDM   Diagnoses as of 05/28/24 0336   Bacterial vaginosis   Vaginal yeast infection   Concern about STD in female without diagnosis   Acute pain of left shoulder       Medical Decision Making  Is a 23-year-old female with history of type 1 diabetes presenting with left shoulder and wrist pain that began a week ago.  The patient states that shoulder pain is a constant sharp pain and she has not been able to fully move her shoulder because of it.  On exam the patient has decreased range of motion of the shoulder and bony tenderness.  The patient also has bony tenderness along the distal ulna of the left wrist but has full range of motion.  X-rays of the left shoulder and wrist were obtained today for further evaluation.  X-ray showed no acute fracture or dislocation of the left shoulder or wrist.  X-rays were individually reviewed today  and I agree with radiology interpretation.  The patient also states she began having abnormal white vaginal discharge yesterday and describes it as cottage cheese appearing.  The patient states she has concerns for STDs and would like testing for trichomonas, yeast, bacterial vaginosis, hepatitis C, syphilis, HIV today.  Patient deferred pelvic exam and did a self swab.  Clue cells and yeast were positive on wet prep today.  Patient was given a dose of metronidazole for treatment of bacterial vaginosis and dose of fluconazole for treatment of yeast in the ED today.  The patient stated that she would like empiric treatment for chlamydia and gonorrhea as well.  Patient was given a dose of ceftriaxone and doxycycline in the ED today.  Discussed with patient that results of all the STD tests would not be done today and she will be contacted when the results are done.  The patient also states she has MyChart and will be able to see the results of the test when they are completed.  ESR and CRP are elevated however that could be due to to the infections the patient is at this time.     Disposition: Discharge home  Discussed results of x-rays with the patient.  Advised the patient to follow-up with her primary care provider within a week for further evaluation and management of her shoulder pain.  Discussed with the patient she can take ibuprofen or Tylenol for pain control until she is seen by her primary care provider.  Prescription for metronidazole for treatment of bacterial vaginosis and doxycycline for empiric treatment for chlamydia were sent to the patient's pharmacy.  Patient was advised to take the entire course of both antibiotics for complete treatment.  Advised the patient to take the doxycycline with plenty of food and water to prevent esophagitis and to wear eye protection when she is out in the sun.  Strict return precautions were given to the patient.  The plan was discussed with the patient the patient  understands and agrees    The patient was discussed and staffed with Dr. Manoj Banuelos PA-C  05/28/24 0545      Emergency Medicine Attending Attestation:     Diagnoses as of 06/03/24 1528   Bacterial vaginosis   Vaginal yeast infection   Concern about STD in female without diagnosis   Acute pain of left shoulder       This patient was seen by the advanced practice provider.  I have personally performed a substantive portion of the encounter.  I have seen and examined the patient; agree with the workup, evaluation, MDM, management and diagnosis.  The care plan has been discussed.      I personally saw the patient and made/approved the management plan and take responsibility for the patient management.    History:   Patient presents to the ED with concern for STDs and wrist and shoulder pain  No rashes or signs of disseminated GC but will screen with swabs and get xrays    No fevers here in the ED   Exam:   Awake alert   No rashes  Ambulatory   Pain with ranging shoulder and wrist but no palpable effusions    MDM:   [] swabs sent  [] xrays without acute injury seen  [] treated with abx (flagyl, ceftriaxone and doxy) but if she has persistant pain given follow up         I independently interpreted patient's EKG and agree with the above mentioned interpretation.          MD Kate Sheriff MD  06/03/24 1521

## 2024-05-27 NOTE — ED TRIAGE NOTES
White, creamy, itchy vaginal discharge x 2 days. Pt states states she has never experienced it before.   Pt also endorse R. Wrist pain x 2 days denies injury to the area. Pt states it hurts to left her arm in ROM.

## 2024-05-28 LAB
C TRACH RRNA SPEC QL NAA+PROBE: NEGATIVE
HOLD SPECIMEN: NORMAL
N GONORRHOEA DNA SPEC QL PROBE+SIG AMP: NEGATIVE
T VAGINALIS RRNA SPEC QL NAA+PROBE: NEGATIVE
TREPONEMA PALLIDUM IGG+IGM AB [PRESENCE] IN SERUM OR PLASMA BY IMMUNOASSAY: NONREACTIVE

## 2024-05-28 NOTE — DISCHARGE INSTRUCTIONS
Please take complete course of doxycycline and metronidazole for complete treatment.  Take doxycycline with plenty of water and food, and use eye protection while in the sun.  Please follow up with your primary care provider within 1 week for further evaluation of the left shoulder pain.  You can take ibuprofen or tylenol for pain control.  Please return to the emergency department if shoulder pain worsens, you have numbness and tingling in fingers, you cannot move your arm due to shoulder pain, or new symptoms begin.

## 2024-06-04 ENCOUNTER — TELEPHONE (OUTPATIENT)
Dept: ENDOCRINOLOGY | Facility: CLINIC | Age: 24
End: 2024-06-04
Payer: MEDICARE

## 2024-06-04 DIAGNOSIS — I10 HYPERTENSION, UNSPECIFIED TYPE: ICD-10-CM

## 2024-06-04 RX ORDER — LISINOPRIL 20 MG/1
20 TABLET ORAL DAILY
Qty: 90 TABLET | Refills: 3 | Status: SHIPPED | OUTPATIENT
Start: 2024-06-04

## 2024-06-04 NOTE — TELEPHONE ENCOUNTER
Request to change Rx for lisinopril to a 90 day Rx for cost savings. Please resend if you agree.  Requested Prescriptions     Pending Prescriptions Disp Refills    lisinopril 20 mg tablet 90 tablet 3     Sig: Take 1 tablet (20 mg) by mouth once daily.

## 2024-06-14 NOTE — TELEPHONE ENCOUNTER
Now requesting same for metformin. Please send 90 day Rx for cost savings    Requested Prescriptions     Pending Prescriptions Disp Refills    metFORMIN (Glucophage) 1,000 mg tablet 90 tablet 3     Sig: Take 1 tablet (1,000 mg) by mouth once daily with breakfast.     Signed Prescriptions Disp Refills    lisinopril 20 mg tablet 90 tablet 3     Sig: Take 1 tablet (20 mg) by mouth once daily.     Authorizing Provider: DIDI CHERY

## 2024-07-02 ENCOUNTER — APPOINTMENT (OUTPATIENT)
Dept: OPHTHALMOLOGY | Facility: CLINIC | Age: 24
End: 2024-07-02
Payer: MEDICARE

## 2024-07-11 ENCOUNTER — CLINICAL SUPPORT (OUTPATIENT)
Dept: OBSTETRICS AND GYNECOLOGY | Facility: CLINIC | Age: 24
End: 2024-07-11
Payer: MEDICARE

## 2024-07-11 DIAGNOSIS — Z32.02 PREGNANCY TEST NEGATIVE: Primary | ICD-10-CM

## 2024-07-11 LAB — PREGNANCY TEST URINE, POC: NEGATIVE

## 2024-07-11 PROCEDURE — 81025 URINE PREGNANCY TEST: CPT

## 2024-07-11 PROCEDURE — 81025 URINE PREGNANCY TEST: CPT | Performed by: OBSTETRICS & GYNECOLOGY

## 2024-07-11 NOTE — PROGRESS NOTES
Pt in office for pregnancy test (negative) instructed pt to return at a later date due to being only a few days late with menstrual.

## 2024-08-21 ENCOUNTER — APPOINTMENT (OUTPATIENT)
Dept: ENDOCRINOLOGY | Facility: CLINIC | Age: 24
End: 2024-08-21
Payer: MEDICARE

## 2024-09-09 ENCOUNTER — APPOINTMENT (OUTPATIENT)
Dept: OBSTETRICS AND GYNECOLOGY | Facility: CLINIC | Age: 24
End: 2024-09-09
Payer: MEDICARE

## 2024-09-11 ENCOUNTER — APPOINTMENT (OUTPATIENT)
Dept: ENDOCRINOLOGY | Facility: CLINIC | Age: 24
End: 2024-09-11
Payer: MEDICARE

## 2024-10-10 ENCOUNTER — LAB (OUTPATIENT)
Dept: LAB | Facility: LAB | Age: 24
End: 2024-10-10
Payer: MEDICARE

## 2024-10-10 ENCOUNTER — APPOINTMENT (OUTPATIENT)
Dept: ENDOCRINOLOGY | Facility: CLINIC | Age: 24
End: 2024-10-10
Payer: MEDICARE

## 2024-10-10 VITALS
DIASTOLIC BLOOD PRESSURE: 71 MMHG | HEART RATE: 70 BPM | WEIGHT: 204 LBS | BODY MASS INDEX: 32.02 KG/M2 | RESPIRATION RATE: 17 BRPM | SYSTOLIC BLOOD PRESSURE: 93 MMHG | TEMPERATURE: 97 F | HEIGHT: 67 IN

## 2024-10-10 DIAGNOSIS — Z91.199 PATIENT NON ADHERENCE: ICD-10-CM

## 2024-10-10 DIAGNOSIS — E10.65 TYPE 1 DIABETES MELLITUS WITH HYPERGLYCEMIA (MULTI): ICD-10-CM

## 2024-10-10 DIAGNOSIS — E11.65 TYPE 2 DIABETES MELLITUS WITH HYPERGLYCEMIA, WITH LONG-TERM CURRENT USE OF INSULIN: Primary | ICD-10-CM

## 2024-10-10 DIAGNOSIS — E10.22 TYPE 1 DIABETES MELLITUS WITH DIABETIC CHRONIC KIDNEY DISEASE, UNSPECIFIED CKD STAGE: ICD-10-CM

## 2024-10-10 DIAGNOSIS — Z79.4 TYPE 2 DIABETES MELLITUS WITH HYPERGLYCEMIA, WITH LONG-TERM CURRENT USE OF INSULIN: ICD-10-CM

## 2024-10-10 DIAGNOSIS — Z79.4 TYPE 2 DIABETES MELLITUS WITH HYPERGLYCEMIA, WITH LONG-TERM CURRENT USE OF INSULIN: Primary | ICD-10-CM

## 2024-10-10 DIAGNOSIS — E11.65 TYPE 2 DIABETES MELLITUS WITH HYPERGLYCEMIA, WITH LONG-TERM CURRENT USE OF INSULIN: ICD-10-CM

## 2024-10-10 PROBLEM — E10.9 CONTROLLED TYPE 1 DIABETES MELLITUS WITHOUT COMPLICATION: Status: RESOLVED | Noted: 2023-08-26 | Resolved: 2024-10-10

## 2024-10-10 LAB
C PEPTIDE SERPL-MCNC: 1.8 NG/ML (ref 0.7–3.9)
TSH SERPL-ACNC: 2.34 MIU/L (ref 0.44–3.98)

## 2024-10-10 PROCEDURE — 84443 ASSAY THYROID STIM HORMONE: CPT

## 2024-10-10 PROCEDURE — 3046F HEMOGLOBIN A1C LEVEL >9.0%: CPT | Performed by: NURSE PRACTITIONER

## 2024-10-10 PROCEDURE — 95251 CONT GLUC MNTR ANALYSIS I&R: CPT | Performed by: NURSE PRACTITIONER

## 2024-10-10 PROCEDURE — 1036F TOBACCO NON-USER: CPT | Performed by: NURSE PRACTITIONER

## 2024-10-10 PROCEDURE — 83519 RIA NONANTIBODY: CPT

## 2024-10-10 PROCEDURE — 36415 COLL VENOUS BLD VENIPUNCTURE: CPT

## 2024-10-10 PROCEDURE — 3074F SYST BP LT 130 MM HG: CPT | Performed by: NURSE PRACTITIONER

## 2024-10-10 PROCEDURE — 2026F EYE IMG VALID EVC RTNOPTHY: CPT | Performed by: NURSE PRACTITIONER

## 2024-10-10 PROCEDURE — 3061F NEG MICROALBUMINURIA REV: CPT | Performed by: NURSE PRACTITIONER

## 2024-10-10 PROCEDURE — 3048F LDL-C <100 MG/DL: CPT | Performed by: NURSE PRACTITIONER

## 2024-10-10 PROCEDURE — 84681 ASSAY OF C-PEPTIDE: CPT

## 2024-10-10 PROCEDURE — 3078F DIAST BP <80 MM HG: CPT | Performed by: NURSE PRACTITIONER

## 2024-10-10 PROCEDURE — 83036 HEMOGLOBIN GLYCOSYLATED A1C: CPT

## 2024-10-10 PROCEDURE — 99215 OFFICE O/P EST HI 40 MIN: CPT | Performed by: NURSE PRACTITIONER

## 2024-10-10 PROCEDURE — 3008F BODY MASS INDEX DOCD: CPT | Performed by: NURSE PRACTITIONER

## 2024-10-10 PROCEDURE — 86341 ISLET CELL ANTIBODY: CPT

## 2024-10-10 PROCEDURE — 83516 IMMUNOASSAY NONANTIBODY: CPT

## 2024-10-10 PROCEDURE — 92229 IMG RTA DETC/MNTR DS POC ALY: CPT | Performed by: NURSE PRACTITIONER

## 2024-10-10 RX ORDER — BLOOD-GLUCOSE SENSOR
EACH MISCELLANEOUS
Qty: 2 EACH | Refills: 11 | Status: SHIPPED | OUTPATIENT
Start: 2024-10-10

## 2024-10-10 RX ORDER — INSULIN GLARGINE 100 [IU]/ML
INJECTION, SOLUTION SUBCUTANEOUS
Qty: 15 ML | Refills: 11 | Status: SHIPPED | OUTPATIENT
Start: 2024-10-10

## 2024-10-10 RX ORDER — PEN NEEDLE, DIABETIC 30 GX3/16"
NEEDLE, DISPOSABLE MISCELLANEOUS
Qty: 200 EACH | Refills: 11 | Status: SHIPPED | OUTPATIENT
Start: 2024-10-10

## 2024-10-10 RX ORDER — METFORMIN HYDROCHLORIDE 1000 MG/1
1000 TABLET ORAL
Qty: 90 TABLET | Refills: 3 | Status: SHIPPED | OUTPATIENT
Start: 2024-10-10

## 2024-10-10 ASSESSMENT — ENCOUNTER SYMPTOMS
NERVOUS/ANXIOUS: 0
NUMBNESS: 0
DIZZINESS: 0
NAUSEA: 0
SEIZURES: 0
DIARRHEA: 0
PALPITATIONS: 0
SHORTNESS OF BREATH: 0
POLYPHAGIA: 0
SLEEP DISTURBANCE: 0
POLYDIPSIA: 0
APPETITE CHANGE: 0
FREQUENCY: 0
ACTIVITY CHANGE: 0
FATIGUE: 0
CONSTIPATION: 0
WEAKNESS: 0

## 2024-10-10 ASSESSMENT — PATIENT HEALTH QUESTIONNAIRE - PHQ9
SUM OF ALL RESPONSES TO PHQ9 QUESTIONS 1 AND 2: 0
2. FEELING DOWN, DEPRESSED OR HOPELESS: NOT AT ALL
1. LITTLE INTEREST OR PLEASURE IN DOING THINGS: NOT AT ALL
2. FEELING DOWN, DEPRESSED OR HOPELESS: NOT AT ALL
SUM OF ALL RESPONSES TO PHQ9 QUESTIONS 1 AND 2: 0
1. LITTLE INTEREST OR PLEASURE IN DOING THINGS: NOT AT ALL

## 2024-10-10 NOTE — PROGRESS NOTES
"Erin Delacruz is a 24 y.o. female who presents for an initial visit for evaluation of Type 1 diabetes mellitus. The initial diagnosis of diabetes was made age  12-13 .     Known complications due to diabetes included nephropathy and obesity    Cardiovascular risk factors include diabetes mellitus, obesity (BMI >= 30 kg/m2), and sedentary lifestyle. The patient is on an ACE inhibitor or angiotensin II receptor blocker.  The patient has not been previously hospitalized due to diabetic ketoacidosis in last year.     Current diabetes regimen is as follows:   Lantus 32 units before bed  Humalog 12 units before meals  Sliding scale 150-200 2 units  201-250 4 units  251-300 6 units  301-350 8 units  351-400 10 units    The patient is not currently checking the blood glucose    Hypoglycemia frequency: Denies   Hypoglycemia awareness: Yes     Exercise: rarely   Meal panning: She is using no plan.    Review of Systems   Constitutional:  Negative for activity change, appetite change and fatigue.   Respiratory:  Negative for shortness of breath.    Cardiovascular:  Negative for chest pain, palpitations and leg swelling.   Gastrointestinal:  Negative for constipation, diarrhea and nausea.   Endocrine: Negative for cold intolerance, heat intolerance, polydipsia, polyphagia and polyuria.   Genitourinary:  Negative for frequency.   Musculoskeletal:  Negative for gait problem.   Skin:  Negative for rash.   Neurological:  Negative for dizziness, seizures, weakness and numbness.   Psychiatric/Behavioral:  Negative for sleep disturbance and suicidal ideas. The patient is not nervous/anxious.      Objective   BP 93/71 (BP Location: Right arm, Patient Position: Sitting)   Pulse 70   Temp 36.1 °C (97 °F)   Resp 17   Ht 1.702 m (5' 7\")   Wt 92.5 kg (204 lb)   BMI 31.95 kg/m²   Physical Exam  Vitals and nursing note reviewed.   Constitutional:       Appearance: She is obese.   HENT:      Head: Atraumatic. "   Pulmonary:      Effort: Pulmonary effort is normal.   Skin:     General: Skin is warm.   Neurological:      General: No focal deficit present.      Mental Status: She is alert and oriented to person, place, and time. Mental status is at baseline.      Gait: Gait normal.   Psychiatric:         Mood and Affect: Mood normal.         Behavior: Behavior normal.         Thought Content: Thought content normal.         Judgment: Judgment normal.       Health Maintenance:   Foot Exam: None. Denies issues.   Eye Exam: Has appointment July 2, 2024. Denies retinopathy.   Lipid Panel: Total 158 Trig 281 April 2024  Urine Albumin: Ratio 73.2 April 2024    Assessment/Plan   Diagnoses and all orders for this visit:  Type 2 diabetes mellitus with hyperglycemia, with long-term current use of insulin  -     dulaglutide (Trulicity) 0.75 mg/0.5 mL pen injector; Inject 0.75 mg under the skin 1 (one) time per week.  -     Diabetic Retinopathy Luminetics  -     TSH with reflex to Free T4 if abnormal; Future  -     C-peptide; Future  -     AYLIN; Future  Type 1 diabetes mellitus with hyperglycemia (Multi)  -     Hemoglobin A1c; Future  -     Tissue transglutaminase, IgA; Future  Type 1 diabetes mellitus with diabetic chronic kidney disease, unspecified CKD stage  -     FreeStyle Leobardo 3 Sensor device; Use asn directed to measure glucose in real time changing every 3 days  Patient non adherence    Type 1 diabetes mellitus, is not at goal. A1c >16%. We discussed at length risks of high glucose.   Her last C-peptide was 0.4. She has not had blood work ordered at last visit. Added AYLIN  labs.   RX changes: Filled all prescriptions. Discussed urgency in glucose control.   Continue metformin 1000 mg AM  Continue Lantus 32 units before bed  Humalog 12 units before meals  Sliding scale   150-200 2 units  201-250 4 units  251-300 6 units  301-350 8 units  351-400 10 units  Restart Leobardo 3 in office today. Instructed on care, use of sensor, when to  call company for support, when to use fingerstick monitor. Handout provided. Sent rx to [facility].   START TRULICITY 0.75 mg weekly  Retinal screening with [facility] is positive at today's visit. Sent communication to retinal team for follow up appointment.   Education:  interpretation of lab results, blood sugar goals, complications of diabetes mellitus, and hypoglycemia prevention and treatment  Hypertension: Stopped lisinopril.  She is not using birth control consistently. She would be ideal candidate for IUD.  We will follow up with BP check in one month.   Follow up: I recommend diabetes care with PhD RN, Sandy, in 4 weeks for Leobardo download and myself in be 3 months.

## 2024-10-10 NOTE — PATIENT INSTRUCTIONS
Type 1 diabetes mellitus, is not at goal. A1c >16%. We discussed at length risks of high glucose.   Her last C-peptide was 0.4. She has not had blood work ordered at last visit. Added AYLIN  labs.   RX changes: Filled all prescriptions. Discussed urgency in glucose control.   Continue metformin 1000 mg AM  Continue Lantus 32 units before bed  Humalog 12 units before meals  Sliding scale   150-200 2 units  201-250 4 units  251-300 6 units  301-350 8 units  351-400 10 units  Restart Leobardo 3 in office today. Instructed on care, use of sensor, when to call company for support, when to use fingerstick monitor. Handout provided. Sent rx to [facility].   START TRULICITY 0.75 mg weekly  Education:  interpretation of lab results, blood sugar goals, complications of diabetes mellitus, and hypoglycemia prevention and treatment  Hypertension: Stopped lisinopril.  She is not using birth control consistently. She would be ideal candidate for IUD.   Follow up: I recommend diabetes care with PhD RN, Sandy, in 4 weeks for Leobardo download and myself in be 3 months.

## 2024-10-11 LAB — TTG IGA SER IA-ACNC: <1 U/ML

## 2024-10-14 LAB
EST. AVERAGE GLUCOSE BLD GHB EST-MCNC: 438 MG/DL
HBA1C MFR BLD: 16.9 %
ISLET CELL512 AB SER IA-ACNC: <5.4 U/ML (ref 0–7.4)

## 2024-10-15 LAB — GAD65 AB SER IA-ACNC: <5 IU/ML (ref 0–5)

## 2024-10-25 DIAGNOSIS — E10.22 TYPE 1 DIABETES MELLITUS WITH DIABETIC CHRONIC KIDNEY DISEASE, UNSPECIFIED CKD STAGE: ICD-10-CM

## 2024-10-25 RX ORDER — BLOOD-GLUCOSE SENSOR
EACH MISCELLANEOUS
Qty: 2 EACH | Refills: 11 | Status: SHIPPED | OUTPATIENT
Start: 2024-10-25

## 2024-10-25 RX ORDER — BLOOD-GLUCOSE SENSOR
EACH MISCELLANEOUS
Qty: 2 EACH | Refills: 11 | Status: SHIPPED
Start: 2024-10-25 | End: 2024-10-25

## 2024-10-25 RX ORDER — BLOOD-GLUCOSE SENSOR
EACH MISCELLANEOUS
Qty: 2 EACH | Refills: 11 | Status: SHIPPED | OUTPATIENT
Start: 2024-10-25 | End: 2024-10-25 | Stop reason: SDUPTHER

## 2024-10-28 ENCOUNTER — LAB (OUTPATIENT)
Dept: LAB | Facility: LAB | Age: 24
End: 2024-10-28
Payer: MEDICARE

## 2024-10-28 ENCOUNTER — OFFICE VISIT (OUTPATIENT)
Dept: OBSTETRICS AND GYNECOLOGY | Facility: CLINIC | Age: 24
End: 2024-10-28
Payer: MEDICARE

## 2024-10-28 ENCOUNTER — APPOINTMENT (OUTPATIENT)
Dept: OBSTETRICS AND GYNECOLOGY | Facility: CLINIC | Age: 24
End: 2024-10-28
Payer: MEDICARE

## 2024-10-28 VITALS
SYSTOLIC BLOOD PRESSURE: 127 MMHG | HEIGHT: 69 IN | WEIGHT: 209.7 LBS | HEART RATE: 88 BPM | DIASTOLIC BLOOD PRESSURE: 78 MMHG | BODY MASS INDEX: 31.06 KG/M2

## 2024-10-28 DIAGNOSIS — Z23 FLU VACCINE NEED: ICD-10-CM

## 2024-10-28 DIAGNOSIS — N92.6 DISORDER OF MENSTRUAL BLEEDING: ICD-10-CM

## 2024-10-28 DIAGNOSIS — F41.9 ANXIETY AND DEPRESSION: ICD-10-CM

## 2024-10-28 DIAGNOSIS — N93.9 ABNORMAL UTERINE BLEEDING: ICD-10-CM

## 2024-10-28 DIAGNOSIS — Z12.4 CERVICAL CANCER SCREENING: ICD-10-CM

## 2024-10-28 DIAGNOSIS — F32.A ANXIETY AND DEPRESSION: ICD-10-CM

## 2024-10-28 DIAGNOSIS — Z30.011 ENCOUNTER FOR INITIAL PRESCRIPTION OF CONTRACEPTIVE PILLS: ICD-10-CM

## 2024-10-28 DIAGNOSIS — Z32.02 PREGNANCY TEST NEGATIVE: ICD-10-CM

## 2024-10-28 DIAGNOSIS — N93.9 ABNORMAL UTERINE BLEEDING: Primary | ICD-10-CM

## 2024-10-28 LAB
B-HCG SERPL-ACNC: <3 MIU/ML
DHEA-S SERPL-MCNC: 60 UG/DL (ref 65–395)
ESTRADIOL SERPL-MCNC: 41 PG/ML
FSH SERPL-ACNC: 7.1 IU/L
PREGNANCY TEST URINE, POC: NEGATIVE
PROLACTIN SERPL-MCNC: 6.3 UG/L (ref 3–20)
TSH SERPL-ACNC: 1.19 MIU/L (ref 0.44–3.98)

## 2024-10-28 PROCEDURE — 84402 ASSAY OF FREE TESTOSTERONE: CPT

## 2024-10-28 PROCEDURE — 99215 OFFICE O/P EST HI 40 MIN: CPT | Performed by: OBSTETRICS & GYNECOLOGY

## 2024-10-28 PROCEDURE — 3061F NEG MICROALBUMINURIA REV: CPT | Performed by: OBSTETRICS & GYNECOLOGY

## 2024-10-28 PROCEDURE — 3046F HEMOGLOBIN A1C LEVEL >9.0%: CPT | Performed by: OBSTETRICS & GYNECOLOGY

## 2024-10-28 PROCEDURE — 3048F LDL-C <100 MG/DL: CPT | Performed by: OBSTETRICS & GYNECOLOGY

## 2024-10-28 PROCEDURE — 84443 ASSAY THYROID STIM HORMONE: CPT

## 2024-10-28 PROCEDURE — 3074F SYST BP LT 130 MM HG: CPT | Performed by: OBSTETRICS & GYNECOLOGY

## 2024-10-28 PROCEDURE — 83498 ASY HYDROXYPROGESTERONE 17-D: CPT

## 2024-10-28 PROCEDURE — 1036F TOBACCO NON-USER: CPT | Performed by: OBSTETRICS & GYNECOLOGY

## 2024-10-28 PROCEDURE — 87491 CHLMYD TRACH DNA AMP PROBE: CPT | Performed by: OBSTETRICS & GYNECOLOGY

## 2024-10-28 PROCEDURE — 3008F BODY MASS INDEX DOCD: CPT | Performed by: OBSTETRICS & GYNECOLOGY

## 2024-10-28 PROCEDURE — 82670 ASSAY OF TOTAL ESTRADIOL: CPT

## 2024-10-28 PROCEDURE — 90656 IIV3 VACC NO PRSV 0.5 ML IM: CPT | Performed by: OBSTETRICS & GYNECOLOGY

## 2024-10-28 PROCEDURE — 88142 CYTOPATH C/V THIN LAYER: CPT | Mod: TC,GCY | Performed by: OBSTETRICS & GYNECOLOGY

## 2024-10-28 PROCEDURE — 87661 TRICHOMONAS VAGINALIS AMPLIF: CPT | Performed by: OBSTETRICS & GYNECOLOGY

## 2024-10-28 PROCEDURE — 84702 CHORIONIC GONADOTROPIN TEST: CPT

## 2024-10-28 PROCEDURE — 84146 ASSAY OF PROLACTIN: CPT

## 2024-10-28 PROCEDURE — 3078F DIAST BP <80 MM HG: CPT | Performed by: OBSTETRICS & GYNECOLOGY

## 2024-10-28 PROCEDURE — 81025 URINE PREGNANCY TEST: CPT | Performed by: OBSTETRICS & GYNECOLOGY

## 2024-10-28 PROCEDURE — 36415 COLL VENOUS BLD VENIPUNCTURE: CPT

## 2024-10-28 PROCEDURE — 82627 DEHYDROEPIANDROSTERONE: CPT

## 2024-10-28 PROCEDURE — 83001 ASSAY OF GONADOTROPIN (FSH): CPT

## 2024-10-28 PROCEDURE — 88141 CYTOPATH C/V INTERPRET: CPT | Performed by: PATHOLOGY

## 2024-10-28 ASSESSMENT — ENCOUNTER SYMPTOMS
CARDIOVASCULAR NEGATIVE: 0
CONSTITUTIONAL NEGATIVE: 0
RESPIRATORY NEGATIVE: 0
EYES NEGATIVE: 0
ENDOCRINE NEGATIVE: 0
GASTROINTESTINAL NEGATIVE: 0
PSYCHIATRIC NEGATIVE: 0
NEUROLOGICAL NEGATIVE: 0
MUSCULOSKELETAL NEGATIVE: 0
ALLERGIC/IMMUNOLOGIC NEGATIVE: 0
HEMATOLOGIC/LYMPHATIC NEGATIVE: 0

## 2024-10-28 ASSESSMENT — PATIENT HEALTH QUESTIONNAIRE - PHQ9
2. FEELING DOWN, DEPRESSED OR HOPELESS: NOT AT ALL
SUM OF ALL RESPONSES TO PHQ9 QUESTIONS 1 AND 2: 0
1. LITTLE INTEREST OR PLEASURE IN DOING THINGS: NOT AT ALL

## 2024-10-28 ASSESSMENT — PAIN SCALES - GENERAL: PAINLEVEL_OUTOF10: 0-NO PAIN

## 2024-10-28 NOTE — PROGRESS NOTES
Flu, vaccine per shaker  Given IM into right deltoid  Supplied by office  Patient tolerated well  VIS given     LOT #: [id number]  Expiration date: 6/30/2025
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
## 2024-10-29 LAB — T VAGINALIS RRNA SPEC QL NAA+PROBE: NEGATIVE

## 2024-10-30 LAB
C TRACH RRNA SPEC QL NAA+PROBE: POSITIVE
N GONORRHOEA DNA SPEC QL PROBE+SIG AMP: NEGATIVE

## 2024-11-01 ENCOUNTER — APPOINTMENT (OUTPATIENT)
Dept: OBSTETRICS AND GYNECOLOGY | Facility: CLINIC | Age: 24
End: 2024-11-01
Payer: MEDICARE

## 2024-11-01 LAB
TESTOSTERONE FREE (CHAN): 2.1 PG/ML (ref 0.1–6.4)
TESTOSTERONE,TOTAL,LC-MS/MS: 16 NG/DL (ref 2–45)

## 2024-11-02 LAB — 17OHP SERPL-MCNC: 25.13 NG/DL

## 2024-11-07 DIAGNOSIS — A74.9 CHLAMYDIA INFECTION: Primary | ICD-10-CM

## 2024-11-07 RX ORDER — DOXYCYCLINE 100 MG/1
100 CAPSULE ORAL 2 TIMES DAILY
Qty: 14 CAPSULE | Refills: 0 | Status: SHIPPED | OUTPATIENT
Start: 2024-11-07 | End: 2024-11-14

## 2024-11-11 LAB
CYTOLOGY CMNT CVX/VAG CYTO-IMP: NORMAL
LAB AP HPV HR: NORMAL
LAB AP PAP ADDITIONAL TESTS: NORMAL
LABORATORY COMMENT REPORT: NORMAL
LABORATORY COMMENT REPORT: NORMAL
LMP START DATE: NORMAL
PATH REPORT.TOTAL CANCER: NORMAL

## 2024-11-12 ENCOUNTER — TELEPHONE (OUTPATIENT)
Dept: OBSTETRICS AND GYNECOLOGY | Facility: CLINIC | Age: 24
End: 2024-11-12

## 2024-11-12 ENCOUNTER — APPOINTMENT (OUTPATIENT)
Dept: OPHTHALMOLOGY | Facility: CLINIC | Age: 24
End: 2024-11-12

## 2024-11-12 NOTE — TELEPHONE ENCOUNTER
----- Message from Jamia Clark sent at 11/7/2024  2:57 PM EST -----  Has had positive CT testing Q month on chart review.    Please confirm that she was treated after Sept positive result, and partner was treated as well.    Rx sent to pharmacy.  Also want to send EPT.      11/7/24 LVM to call office at [phone]   11/11/24 LVM to call office My Chart message also sent

## 2024-11-13 ENCOUNTER — TELEPHONE (OUTPATIENT)
Dept: OBSTETRICS AND GYNECOLOGY | Facility: CLINIC | Age: 24
End: 2024-11-13
Payer: MEDICARE

## 2024-11-13 NOTE — TELEPHONE ENCOUNTER
----- Message from Jamia Clark sent at 11/7/2024  2:57 PM EST -----  Has had positive CT testing Q month on chart review.    Please confirm that she was treated after Sept positive result, and partner was treated as well.    Rx sent to pharmacy.  Also want to send EPT.

## 2024-11-14 ENCOUNTER — TELEPHONE (OUTPATIENT)
Dept: OBSTETRICS AND GYNECOLOGY | Facility: CLINIC | Age: 24
End: 2024-11-14
Payer: MEDICARE

## 2024-11-27 ENCOUNTER — APPOINTMENT (OUTPATIENT)
Dept: OBSTETRICS AND GYNECOLOGY | Facility: CLINIC | Age: 24
End: 2024-11-27
Payer: MEDICARE

## 2025-01-03 ENCOUNTER — APPOINTMENT (OUTPATIENT)
Dept: OBSTETRICS AND GYNECOLOGY | Facility: CLINIC | Age: 25
End: 2025-01-03
Payer: MEDICARE

## 2025-01-22 ENCOUNTER — CLINICAL SUPPORT (OUTPATIENT)
Dept: OBSTETRICS AND GYNECOLOGY | Facility: CLINIC | Age: 25
End: 2025-01-22
Payer: MEDICARE

## 2025-01-22 ENCOUNTER — APPOINTMENT (OUTPATIENT)
Dept: OBSTETRICS AND GYNECOLOGY | Facility: CLINIC | Age: 25
End: 2025-01-22
Payer: MEDICARE

## 2025-01-22 VITALS
DIASTOLIC BLOOD PRESSURE: 77 MMHG | SYSTOLIC BLOOD PRESSURE: 121 MMHG | BODY MASS INDEX: 31.48 KG/M2 | WEIGHT: 213.19 LBS | HEART RATE: 93 BPM

## 2025-01-22 DIAGNOSIS — Z32.01 PREGNANCY TEST POSITIVE (HHS-HCC): Primary | ICD-10-CM

## 2025-01-22 LAB — PREGNANCY TEST URINE, POC: POSITIVE

## 2025-01-22 PROCEDURE — 81025 URINE PREGNANCY TEST: CPT

## 2025-01-22 PROCEDURE — 81025 URINE PREGNANCY TEST: CPT | Performed by: OBSTETRICS & GYNECOLOGY

## 2025-01-22 ASSESSMENT — PAIN SCALES - GENERAL: PAINLEVEL_OUTOF10: 0-NO PAIN

## 2025-01-22 NOTE — PROGRESS NOTES
Pt in office for nurse visit for possible pregnancy (positive at home pregnancy test 1/20/2025)  LMP; Unknown, spotting 12/22/2024 (irregular cycle)  Pregnancy test in office today is positive, pt states unplanned pregnancy but will be continuing with pregnancy. Initial OB appointment scheduled for 1/28/2025 @9 am with Dr. Phillips.

## 2025-01-23 DIAGNOSIS — O24.011 TYPE 1 DIABETES MELLITUS COMPLICATING PREGNANCY IN FIRST TRIMESTER, ANTEPARTUM (HHS-HCC): Primary | ICD-10-CM

## 2025-01-23 NOTE — PROGRESS NOTES
Left detailed message on cell phone for patient explaining she will need a MAC US and referral placed to MFM.    Instructed her to STOP Trulicity  Instructed to restart Leobardo 3 on phone so when she sees OB they can dose her insulin to reduce risks to herself and fetus.     Discussed urgency in glucose control.     Discussed that She will follow with OB and then see me after pregnancy since I cannot see any glucoses at this time.

## 2025-01-27 ENCOUNTER — TELEPHONE (OUTPATIENT)
Dept: ENDOCRINOLOGY | Facility: CLINIC | Age: 25
End: 2025-01-27
Payer: MEDICARE

## 2025-01-27 PROBLEM — I10 CHRONIC HYPERTENSION: Status: RESOLVED | Noted: 2023-08-26 | Resolved: 2025-01-27

## 2025-01-27 PROBLEM — O36.5990: Status: RESOLVED | Noted: 2023-08-26 | Resolved: 2025-01-27

## 2025-01-27 PROBLEM — O35.BXX0 ABNORMAL FETAL ECHOCARDIOGRAM AFFECTING ANTEPARTUM CARE OF MOTHER (HHS-HCC): Status: RESOLVED | Noted: 2023-08-26 | Resolved: 2025-01-27

## 2025-01-27 PROBLEM — N93.9 ABNORMAL VAGINAL BLEEDING: Status: RESOLVED | Noted: 2023-11-17 | Resolved: 2025-01-27

## 2025-01-27 PROBLEM — O10.919 HTN IN PREGNANCY, CHRONIC (HHS-HCC): Status: RESOLVED | Noted: 2023-08-26 | Resolved: 2025-01-27

## 2025-01-27 PROBLEM — Z34.90 PREGNANCY (HHS-HCC): Status: RESOLVED | Noted: 2023-08-26 | Resolved: 2025-01-27

## 2025-01-27 NOTE — TELEPHONE ENCOUNTER
Spoke with patient to confirm she received and understood message from provider Polly Carreno. Patient verified she did receive message and will discontinue trulicity and will use sara 3. Stated she is waiting to receive sensors but is monitoring her blood sugars. Office code was given for patient to connect to  Diabetes center. Patient verbalized understanding stating she will call office if any further issues occur.   Mesha Willis RN

## 2025-01-28 ENCOUNTER — APPOINTMENT (OUTPATIENT)
Dept: RADIOLOGY | Facility: HOSPITAL | Age: 25
End: 2025-01-28
Payer: MEDICARE

## 2025-01-28 ENCOUNTER — INITIAL PRENATAL (OUTPATIENT)
Dept: MATERNAL FETAL MEDICINE | Facility: CLINIC | Age: 25
End: 2025-01-28
Payer: MEDICARE

## 2025-01-28 ENCOUNTER — HOSPITAL ENCOUNTER (INPATIENT)
Facility: HOSPITAL | Age: 25
End: 2025-01-28
Attending: STUDENT IN AN ORGANIZED HEALTH CARE EDUCATION/TRAINING PROGRAM | Admitting: STUDENT IN AN ORGANIZED HEALTH CARE EDUCATION/TRAINING PROGRAM
Payer: MEDICARE

## 2025-01-28 ENCOUNTER — APPOINTMENT (OUTPATIENT)
Dept: OBSTETRICS AND GYNECOLOGY | Facility: CLINIC | Age: 25
DRG: 832 | End: 2025-01-28
Payer: MEDICARE

## 2025-01-28 VITALS — HEART RATE: 107 BPM | DIASTOLIC BLOOD PRESSURE: 74 MMHG | SYSTOLIC BLOOD PRESSURE: 120 MMHG

## 2025-01-28 DIAGNOSIS — N18.1 TYPE 2 DIABETES MELLITUS WITH STAGE 1 CHRONIC KIDNEY DISEASE, WITH LONG-TERM CURRENT USE OF INSULIN (MULTI): Primary | ICD-10-CM

## 2025-01-28 DIAGNOSIS — O24.011 TYPE 1 DIABETES MELLITUS COMPLICATING PREGNANCY IN FIRST TRIMESTER, ANTEPARTUM (HHS-HCC): ICD-10-CM

## 2025-01-28 DIAGNOSIS — R30.0: ICD-10-CM

## 2025-01-28 DIAGNOSIS — E10.22 TYPE 1 DIABETES MELLITUS WITH DIABETIC CHRONIC KIDNEY DISEASE, UNSPECIFIED CKD STAGE: ICD-10-CM

## 2025-01-28 DIAGNOSIS — Z79.4 TYPE 2 DIABETES MELLITUS WITH STAGE 1 CHRONIC KIDNEY DISEASE, WITH LONG-TERM CURRENT USE OF INSULIN (MULTI): ICD-10-CM

## 2025-01-28 DIAGNOSIS — O24.111 TYPE 2 DIABETES MELLITUS AFFECTING PREGNANCY IN FIRST TRIMESTER, ANTEPARTUM (HHS-HCC): ICD-10-CM

## 2025-01-28 DIAGNOSIS — Z79.4 TYPE 2 DIABETES MELLITUS WITH STAGE 1 CHRONIC KIDNEY DISEASE, WITH LONG-TERM CURRENT USE OF INSULIN (MULTI): Primary | ICD-10-CM

## 2025-01-28 DIAGNOSIS — B96.89 BACTERIAL VAGINOSIS IN PREGNANCY (HHS-HCC): ICD-10-CM

## 2025-01-28 DIAGNOSIS — L02.91 ABSCESS: ICD-10-CM

## 2025-01-28 DIAGNOSIS — O10.919 CHRONIC HYPERTENSION IN PREGNANCY (HHS-HCC): ICD-10-CM

## 2025-01-28 DIAGNOSIS — O36.80X0 PREGNANCY, LOCATION UNKNOWN (HHS-HCC): ICD-10-CM

## 2025-01-28 DIAGNOSIS — J10.1 INFLUENZA A: ICD-10-CM

## 2025-01-28 DIAGNOSIS — E11.22 TYPE 2 DIABETES MELLITUS WITH STAGE 1 CHRONIC KIDNEY DISEASE, WITH LONG-TERM CURRENT USE OF INSULIN (MULTI): Primary | ICD-10-CM

## 2025-01-28 DIAGNOSIS — O23.599 VAGINITIS AFFECTING PREGNANCY, ANTEPARTUM: ICD-10-CM

## 2025-01-28 DIAGNOSIS — N18.1 TYPE 2 DIABETES MELLITUS WITH STAGE 1 CHRONIC KIDNEY DISEASE, WITH LONG-TERM CURRENT USE OF INSULIN (MULTI): ICD-10-CM

## 2025-01-28 DIAGNOSIS — O26.899: ICD-10-CM

## 2025-01-28 DIAGNOSIS — L02.31 GLUTEAL ABSCESS: ICD-10-CM

## 2025-01-28 DIAGNOSIS — O23.599 BACTERIAL VAGINOSIS IN PREGNANCY (HHS-HCC): ICD-10-CM

## 2025-01-28 DIAGNOSIS — O99.810: Primary | ICD-10-CM

## 2025-01-28 DIAGNOSIS — E11.22 TYPE 2 DIABETES MELLITUS WITH STAGE 1 CHRONIC KIDNEY DISEASE, WITH LONG-TERM CURRENT USE OF INSULIN (MULTI): ICD-10-CM

## 2025-01-28 LAB
ALBUMIN SERPL BCP-MCNC: 3.8 G/DL (ref 3.4–5)
ALP SERPL-CCNC: 110 U/L (ref 33–110)
ALT SERPL W P-5'-P-CCNC: 8 U/L (ref 7–45)
ANION GAP BLDV CALCULATED.4IONS-SCNC: 14 MMOL/L (ref 10–25)
ANION GAP SERPL CALC-SCNC: 14 MMOL/L (ref 10–20)
APPEARANCE UR: CLEAR
AST SERPL W P-5'-P-CCNC: 14 U/L (ref 9–39)
B-HCG SERPL-ACNC: 5032 MIU/ML
B-OH-BUTYR SERPL-SCNC: 0.37 MMOL/L (ref 0.02–0.27)
BASE EXCESS BLDV CALC-SCNC: -1.8 MMOL/L (ref -2–3)
BASOPHILS # BLD AUTO: 0.04 X10*3/UL (ref 0–0.1)
BASOPHILS NFR BLD AUTO: 0.4 %
BILIRUB SERPL-MCNC: 0.4 MG/DL (ref 0–1.2)
BILIRUB UR STRIP.AUTO-MCNC: NEGATIVE MG/DL
BODY TEMPERATURE: 37 DEGREES CELSIUS
BUN SERPL-MCNC: 8 MG/DL (ref 6–23)
CA-I BLDV-SCNC: 1.24 MMOL/L (ref 1.1–1.33)
CALCIUM SERPL-MCNC: 9.5 MG/DL (ref 8.6–10.6)
CHLORIDE BLDV-SCNC: 99 MMOL/L (ref 98–107)
CHLORIDE SERPL-SCNC: 99 MMOL/L (ref 98–107)
CO2 SERPL-SCNC: 25 MMOL/L (ref 21–32)
COLOR UR: ABNORMAL
CREAT SERPL-MCNC: 0.6 MG/DL (ref 0.5–1.05)
EGFRCR SERPLBLD CKD-EPI 2021: >90 ML/MIN/1.73M*2
EOSINOPHIL # BLD AUTO: 0.05 X10*3/UL (ref 0–0.7)
EOSINOPHIL NFR BLD AUTO: 0.5 %
ERYTHROCYTE [DISTWIDTH] IN BLOOD BY AUTOMATED COUNT: 12.4 % (ref 11.5–14.5)
GLUCOSE BLD MANUAL STRIP-MCNC: 284 MG/DL (ref 74–99)
GLUCOSE BLD MANUAL STRIP-MCNC: 287 MG/DL (ref 74–99)
GLUCOSE BLD MANUAL STRIP-MCNC: 315 MG/DL (ref 74–99)
GLUCOSE BLDV-MCNC: 353 MG/DL (ref 74–99)
GLUCOSE SERPL-MCNC: 330 MG/DL (ref 74–99)
GLUCOSE UR STRIP.AUTO-MCNC: ABNORMAL MG/DL
HCO3 BLDV-SCNC: 23.1 MMOL/L (ref 22–26)
HCT VFR BLD AUTO: 32.7 % (ref 36–46)
HCT VFR BLD EST: 34 % (ref 36–46)
HGB BLD-MCNC: 10.8 G/DL (ref 12–16)
HGB BLDV-MCNC: 11.4 G/DL (ref 12–16)
IMM GRANULOCYTES # BLD AUTO: 0.05 X10*3/UL (ref 0–0.7)
IMM GRANULOCYTES NFR BLD AUTO: 0.5 % (ref 0–0.9)
INHALED O2 CONCENTRATION: 21 %
KETONES UR STRIP.AUTO-MCNC: ABNORMAL MG/DL
LACTATE BLDV-SCNC: 1.2 MMOL/L (ref 0.4–2)
LEUKOCYTE ESTERASE UR QL STRIP.AUTO: NEGATIVE
LYMPHOCYTES # BLD AUTO: 2.06 X10*3/UL (ref 1.2–4.8)
LYMPHOCYTES NFR BLD AUTO: 18.8 %
MCH RBC QN AUTO: 23.3 PG (ref 26–34)
MCHC RBC AUTO-ENTMCNC: 33 G/DL (ref 32–36)
MCV RBC AUTO: 71 FL (ref 80–100)
MONOCYTES # BLD AUTO: 0.8 X10*3/UL (ref 0.1–1)
MONOCYTES NFR BLD AUTO: 7.3 %
NEUTROPHILS # BLD AUTO: 7.97 X10*3/UL (ref 1.2–7.7)
NEUTROPHILS NFR BLD AUTO: 72.5 %
NITRITE UR QL STRIP.AUTO: NEGATIVE
NRBC BLD-RTO: 0 /100 WBCS (ref 0–0)
OXYHGB MFR BLDV: 71.4 % (ref 45–75)
PCO2 BLDV: 39 MM HG (ref 41–51)
PH BLDV: 7.38 PH (ref 7.33–7.43)
PH UR STRIP.AUTO: 6 [PH]
PLATELET # BLD AUTO: 477 X10*3/UL (ref 150–450)
PO2 BLDV: 43 MM HG (ref 35–45)
POTASSIUM BLDV-SCNC: 3.5 MMOL/L (ref 3.5–5.3)
POTASSIUM SERPL-SCNC: 3.5 MMOL/L (ref 3.5–5.3)
PREGNANCY TEST URINE, POC: POSITIVE
PROT SERPL-MCNC: 7.9 G/DL (ref 6.4–8.2)
PROT UR STRIP.AUTO-MCNC: ABNORMAL MG/DL
RBC # BLD AUTO: 4.64 X10*6/UL (ref 4–5.2)
RBC # UR STRIP.AUTO: NEGATIVE /UL
RBC #/AREA URNS AUTO: NORMAL /HPF
SAO2 % BLDV: 73 % (ref 45–75)
SODIUM BLDV-SCNC: 133 MMOL/L (ref 136–145)
SODIUM SERPL-SCNC: 134 MMOL/L (ref 136–145)
SP GR UR STRIP.AUTO: 1.03
SQUAMOUS #/AREA URNS AUTO: NORMAL /HPF
UROBILINOGEN UR STRIP.AUTO-MCNC: ABNORMAL MG/DL
WBC # BLD AUTO: 11 X10*3/UL (ref 4.4–11.3)
WBC #/AREA URNS AUTO: NORMAL /HPF

## 2025-01-28 PROCEDURE — 85025 COMPLETE CBC W/AUTO DIFF WBC: CPT | Performed by: PHYSICIAN ASSISTANT

## 2025-01-28 PROCEDURE — 86706 HEP B SURFACE ANTIBODY: CPT

## 2025-01-28 PROCEDURE — 99215 OFFICE O/P EST HI 40 MIN: CPT | Mod: GC | Performed by: OBSTETRICS & GYNECOLOGY

## 2025-01-28 PROCEDURE — 82947 ASSAY GLUCOSE BLOOD QUANT: CPT

## 2025-01-28 PROCEDURE — 76815 OB US LIMITED FETUS(S): CPT | Performed by: STUDENT IN AN ORGANIZED HEALTH CARE EDUCATION/TRAINING PROGRAM

## 2025-01-28 PROCEDURE — 99285 EMERGENCY DEPT VISIT HI MDM: CPT

## 2025-01-28 PROCEDURE — 2500000004 HC RX 250 GENERAL PHARMACY W/ HCPCS (ALT 636 FOR OP/ED): Performed by: PHYSICIAN ASSISTANT

## 2025-01-28 PROCEDURE — 81001 URINALYSIS AUTO W/SCOPE: CPT | Performed by: PHYSICIAN ASSISTANT

## 2025-01-28 PROCEDURE — 87340 HEPATITIS B SURFACE AG IA: CPT

## 2025-01-28 PROCEDURE — 84132 ASSAY OF SERUM POTASSIUM: CPT | Performed by: PHYSICIAN ASSISTANT

## 2025-01-28 PROCEDURE — 82947 ASSAY GLUCOSE BLOOD QUANT: CPT | Performed by: OBSTETRICS & GYNECOLOGY

## 2025-01-28 PROCEDURE — 86317 IMMUNOASSAY INFECTIOUS AGENT: CPT

## 2025-01-28 PROCEDURE — 86780 TREPONEMA PALLIDUM: CPT

## 2025-01-28 PROCEDURE — 86803 HEPATITIS C AB TEST: CPT

## 2025-01-28 PROCEDURE — 76856 US EXAM PELVIC COMPLETE: CPT

## 2025-01-28 PROCEDURE — 81025 URINE PREGNANCY TEST: CPT | Performed by: PHYSICIAN ASSISTANT

## 2025-01-28 PROCEDURE — 82010 KETONE BODYS QUAN: CPT | Performed by: PHYSICIAN ASSISTANT

## 2025-01-28 PROCEDURE — 83020 HEMOGLOBIN ELECTROPHORESIS: CPT

## 2025-01-28 PROCEDURE — 86704 HEP B CORE ANTIBODY TOTAL: CPT

## 2025-01-28 PROCEDURE — 81003 URINALYSIS AUTO W/O SCOPE: CPT | Performed by: PHYSICIAN ASSISTANT

## 2025-01-28 PROCEDURE — 84702 CHORIONIC GONADOTROPIN TEST: CPT | Performed by: PHYSICIAN ASSISTANT

## 2025-01-28 PROCEDURE — 83036 HEMOGLOBIN GLYCOSYLATED A1C: CPT

## 2025-01-28 PROCEDURE — 83021 HEMOGLOBIN CHROMOTOGRAPHY: CPT

## 2025-01-28 PROCEDURE — 99285 EMERGENCY DEPT VISIT HI MDM: CPT | Mod: 25 | Performed by: STUDENT IN AN ORGANIZED HEALTH CARE EDUCATION/TRAINING PROGRAM

## 2025-01-28 PROCEDURE — 87389 HIV-1 AG W/HIV-1&-2 AB AG IA: CPT

## 2025-01-28 PROCEDURE — 76817 TRANSVAGINAL US OBSTETRIC: CPT | Performed by: STUDENT IN AN ORGANIZED HEALTH CARE EDUCATION/TRAINING PROGRAM

## 2025-01-28 PROCEDURE — 36415 COLL VENOUS BLD VENIPUNCTURE: CPT | Performed by: PHYSICIAN ASSISTANT

## 2025-01-28 RX ORDER — LISINOPRIL 20 MG/1
TABLET ORAL
Status: ON HOLD | COMMUNITY
Start: 2025-01-23

## 2025-01-28 RX ORDER — ONDANSETRON HYDROCHLORIDE 2 MG/ML
4 INJECTION, SOLUTION INTRAVENOUS ONCE
Status: COMPLETED | OUTPATIENT
Start: 2025-01-28 | End: 2025-01-28

## 2025-01-28 RX ADMIN — ONDANSETRON 4 MG: 2 INJECTION INTRAMUSCULAR; INTRAVENOUS at 16:25

## 2025-01-28 RX ADMIN — SODIUM CHLORIDE, POTASSIUM CHLORIDE, SODIUM LACTATE AND CALCIUM CHLORIDE 1000 ML: 600; 310; 30; 20 INJECTION, SOLUTION INTRAVENOUS at 16:26

## 2025-01-28 SDOH — ECONOMIC STABILITY: TRANSPORTATION INSECURITY
IN THE PAST 12 MONTHS, HAS LACK OF TRANSPORTATION KEPT YOU FROM MEETINGS, WORK, OR FROM GETTING THINGS NEEDED FOR DAILY LIVING?: NO

## 2025-01-28 SDOH — ECONOMIC STABILITY: FOOD INSECURITY: WITHIN THE PAST 12 MONTHS, THE FOOD YOU BOUGHT JUST DIDN'T LAST AND YOU DIDN'T HAVE MONEY TO GET MORE.: NEVER TRUE

## 2025-01-28 SDOH — ECONOMIC STABILITY: TRANSPORTATION INSECURITY
IN THE PAST 12 MONTHS, HAS THE LACK OF TRANSPORTATION KEPT YOU FROM MEDICAL APPOINTMENTS OR FROM GETTING MEDICATIONS?: NO

## 2025-01-28 SDOH — ECONOMIC STABILITY: FOOD INSECURITY: WITHIN THE PAST 12 MONTHS, YOU WORRIED THAT YOUR FOOD WOULD RUN OUT BEFORE YOU GOT MONEY TO BUY MORE.: NEVER TRUE

## 2025-01-28 ASSESSMENT — EDINBURGH POSTNATAL DEPRESSION SCALE (EPDS)
I HAVE FELT SAD OR MISERABLE: NO, NOT AT ALL
I HAVE BEEN ABLE TO LAUGH AND SEE THE FUNNY SIDE OF THINGS: AS MUCH AS I ALWAYS COULD
I HAVE BEEN ANXIOUS OR WORRIED FOR NO GOOD REASON: YES, SOMETIMES
TOTAL SCORE: 4
I HAVE BLAMED MYSELF UNNECESSARILY WHEN THINGS WENT WRONG: NO, NEVER
I HAVE BEEN SO UNHAPPY THAT I HAVE HAD DIFFICULTY SLEEPING: NOT AT ALL
I HAVE LOOKED FORWARD WITH ENJOYMENT TO THINGS: AS MUCH AS I EVER DID
I HAVE BEEN SO UNHAPPY THAT I HAVE BEEN CRYING: NO, NEVER
THE THOUGHT OF HARMING MYSELF HAS OCCURRED TO ME: NEVER
I HAVE FELT SCARED OR PANICKY FOR NO GOOD REASON: NO, NOT AT ALL
THINGS HAVE BEEN GETTING ON TOP OF ME: YES, SOMETIMES I HAVEN'T BEEN COPING AS WELL AS USUAL

## 2025-01-28 ASSESSMENT — PAIN SCALES - GENERAL
PAINLEVEL_OUTOF10: 0 - NO PAIN
PAINLEVEL_OUTOF10: 9

## 2025-01-28 ASSESSMENT — SOCIAL DETERMINANTS OF HEALTH (SDOH)
WITHIN THE LAST YEAR, HAVE YOU BEEN KICKED, HIT, SLAPPED, OR OTHERWISE PHYSICALLY HURT BY YOUR PARTNER OR EX-PARTNER?: NO
WITHIN THE LAST YEAR, HAVE YOU BEEN HUMILIATED OR EMOTIONALLY ABUSED IN OTHER WAYS BY YOUR PARTNER OR EX-PARTNER?: NO
WITHIN THE LAST YEAR, HAVE TO BEEN RAPED OR FORCED TO HAVE ANY KIND OF SEXUAL ACTIVITY BY YOUR PARTNER OR EX-PARTNER?: NO
WITHIN THE LAST YEAR, HAVE TO BEEN RAPED OR FORCED TO HAVE ANY KIND OF SEXUAL ACTIVITY BY YOUR PARTNER OR EX-PARTNER?: NO
WITHIN THE LAST YEAR, HAVE YOU BEEN AFRAID OF YOUR PARTNER OR EX-PARTNER?: NO
WITHIN THE LAST YEAR, HAVE YOU BEEN KICKED, HIT, SLAPPED, OR OTHERWISE PHYSICALLY HURT BY YOUR PARTNER OR EX-PARTNER?: NO
WITHIN THE LAST YEAR, HAVE YOU BEEN AFRAID OF YOUR PARTNER OR EX-PARTNER?: NO
HOW HARD IS IT FOR YOU TO PAY FOR THE VERY BASICS LIKE FOOD, HOUSING, MEDICAL CARE, AND HEATING?: NOT VERY HARD
WITHIN THE LAST YEAR, HAVE YOU BEEN HUMILIATED OR EMOTIONALLY ABUSED IN OTHER WAYS BY YOUR PARTNER OR EX-PARTNER?: NO

## 2025-01-28 ASSESSMENT — COLUMBIA-SUICIDE SEVERITY RATING SCALE - C-SSRS
2. HAVE YOU ACTUALLY HAD ANY THOUGHTS OF KILLING YOURSELF?: NO
6. HAVE YOU EVER DONE ANYTHING, STARTED TO DO ANYTHING, OR PREPARED TO DO ANYTHING TO END YOUR LIFE?: NO
1. IN THE PAST MONTH, HAVE YOU WISHED YOU WERE DEAD OR WISHED YOU COULD GO TO SLEEP AND NOT WAKE UP?: NO

## 2025-01-28 ASSESSMENT — PAIN - FUNCTIONAL ASSESSMENT
PAIN_FUNCTIONAL_ASSESSMENT: 0-10
PAIN_FUNCTIONAL_ASSESSMENT: 0-10

## 2025-01-28 NOTE — PROGRESS NOTES
.2025   Xavier Delacruz     MFM CONSULT NOTE  Referring Clinician: Polly Carreno   Reason for consult: T2DM with CKD, cHTN     HPI: Xavier Delacruz is a 24 y.o.  at Unknown here for consult for T2DM with CKD, and cHTN.     T2DM  -Pt diagnosed age 15. Patient was started on Humalog and Lantus and tolerated it well. Currently taking Humalog 12u with meals + SSI, Lantus 52u at bedtime and Metformin 1000mg daily. Follows with  Endocrinology who has been managing her blood sugars. C-peptide, AYLIN, Islet Antigen 2- Antibody negative. Patient recently starting using CGM (has Leobardo 3 placede yesterday), prior to, was checking with fingerstick. She states poor sugar control, fasting as high as 300, despite not eating well over the past two weeks. Patient states she noticed a painful boil forming near her left buttock. Since boil, patient has not been eating due to pain, as well as nausea and vomiting in pregnancy. Denies fever, chills or drainage from boil    Patient reports a history of inpatient admission for DKA, most recently, last year, however denies DKA in prior pregnancy. Although patient has not been taking in much PO lately, she consumes water, and eats what her parents cook which is usually carb heavy meals  (I.e mashed potatoes, mac and cheese, ribs, and rice).  Has known cataracts and has missed last few optho appointments due to transportation issues, which is not longer an issue. Patient also has known CKD and was previously on Lisinopril (unknown dosing), that she stopped taking while pregnant. Denies HA, change in vision, or neuropathy.    Patient reports vaginal itching, burning and foul smelling odor that began two weeks ago, She suspects it is a yeast infection due to how poorly controlled her BG have been. She has dysuria, denies hematuria, ctx, abdominal cramping , VB, LOF.     Pregnancy notables  -T2DM c/b diabetic CKD   -cHTN  -h/o child with cardiac anomaly in prior  pregnancy     OBHx:   (), , IOL @ 36.55 wga due to poorly controlled T1DM, c/b fetal diabetic induced cardiomyopathy and FGR   Gynhx: remote history of chlamydia, tx   PMH: T2DM c/b diabetic CKD Stage 1, cHTN   PSH: none  All: NKDA  Meds: Metformin 1000 mg  SocHx: denies tobacco, alcohol, and illicit drug use     OBJECTIVE  Visit Vitals  /74   Pulse 107   OB Status Pregnant   Smoking Status Never       Physical exam  .General: no acute distress   HEENT: normocephalic, atraumatic   Cards: RRR   Pulm: lungs CTAB   OB: FHT [place] SVE   Abdomen: soft, gravid, no fundal tenderness   : copious thick white coating of labia and thin white discharge in posterior vault, with foul smelling odor   Extremities: no lesions, skin discoloration or calf tenderness   Neuro: no focal deficits   Derm: 2-3 cm raised lesion with surrounding erythema and desqumation of surroudning skin, tender to touch     FHT: unknown viability     ASSESSMENT & PLAN    Xavier Delacruz is a 24 y.o.  at Unknown here for the following:    Assessment & Plan  Type 2 diabetes mellitus with stage 1 chronic kidney disease, with long-term current use of insulin (Multi)  Patient is known to MFM service from prior pregnancy, currently with poorly controlled BG.   BG in clinic 287. Patient did not eat breakfast today or dinner yesterday. Denies food insecurity, but has been concerned regarding BG. We discussed the pregnancy implications of the diagnosis of T2DM including increased risk of pre-eclampsia, LGA/macrosomia, shoulder dystocia, stillbirth as well as  hypoglycemia, hyperbilirubinemia and respiratory distress.  We discussed the increased risk of congenital malformations, particularly cardiac. We reviewed the importance of glycemic control and its impact on lowering these risks. Given sub optimal control with unknown viability, recommend inpatient management for blood glucose optimatization. Patient is amendable after  establishing  for daughter.   Plan for baseline HELLP labs and urine protein and TSH   Recommend fetal ECHO starting at 18-22 wga  Recommend maternal ECHO  Recommend A1c qtrimester   Recommend serial growth US in third trimester and twice weekly  testing starting at 32 weeks     Pregnancy, location unknown ([facility])  Discussed with patient concerns for pregnancy of unknown location given last Hcg of 85 and TVUS that did not demonstrate an IUP. Plan to repeat TVUS and Hcg   Chronic hypertension in pregnancy ([facility])  We did not discuss cHTN in detail, as patient denied history of cHTN, however upon chart review, she has cHTN in prior pregnancies, patient is currently not on BP meds and has normotensive BP and is asymptomatic for pre-eclampsia. Baseline HELLP labs will be obtained during inpatient blood sugar optimization   Vaginitis affecting pregnancy, antepartum  Wet prep, GC/CT swabs collected  Dysuria during pregnancy, antepartum ([facility])  UA ordered     Thank you for allowing us to participate in the care of your patient. Given her medical complexity we will transfer her care to our service. Please do not hesitate to contact us with any questions    Dispo: patient to present [place] [facility] [place] for blood glucose optimization     Seen and d/w Dr. Feroz Dominguez MD , PGY-3   Maternal Fetal Medicine

## 2025-01-28 NOTE — LETTER
2025     Polly Carreno, APRN-CNP  [address] [facility] [address]  [facility] [address]    Patient: Xavier Delacruz   YOB: 2000   Date of Visit: 2025       Dear Dr. Polly Carreno, APRN-CNP:    Thank you for referring Xavier Delacruz to me for evaluation. Below are my notes for this consultation.  If you have questions, please do not hesitate to call me. I look forward to following your patient along with you.       Sincerely,     Otto Redman MD      CC: No Recipients  ______________________________________________________________________________________    .2025   Xavier Delacruz     MFM CONSULT NOTE  Referring Clinician: Polly Carreno   Reason for consult: T2DM with CKD, cHTN     HPI: Xavier Delacruz is a 24 y.o.  at Unknown here for consult for T2DM with CKD, and cHTN.     T2DM  -Pt diagnosed age 15. Patient was started on Humalog and Lantus and tolerated it well. Currently taking Humalog 12u with meals + SSI, Lantus 52u at bedtime and Metformin 1000mg daily. Follows with  Endocrinology who has been managing her blood sugars. C-peptide, AYLIN, Islet Antigen 2- Antibody negative. Patient recently starting using CGM (has Leobardo 3 placede yesterday), prior to, was checking with fingerstick. She states poor sugar control, fasting as high as 300, despite not eating well over the past two weeks. Patient states she noticed a painful boil forming near her left buttock. Since boil, patient has not been eating due to pain, as well as nausea and vomiting in pregnancy. Denies fever, chills or drainage from boil    Patient reports a history of inpatient admission for DKA, most recently, last year, however denies DKA in prior pregnancy. Although patient has not been taking in much PO lately, she consumes water, and eats what her parents cook which is usually carb heavy meals  (I.e mashed potatoes, mac and cheese, ribs, and rice).  Has known cataracts and has  missed last few optho appointments due to transportation issues, which is not longer an issue. Patient also has known CKD and was previously on Lisinopril (unknown dosing), that she stopped taking while pregnant. Denies HA, change in vision, or neuropathy.    Patient reports vaginal itching, burning and foul smelling odor that began two weeks ago, She suspects it is a yeast infection due to how poorly controlled her BG have been. She has dysuria, denies hematuria, ctx, abdominal cramping , VB, LOF.     Pregnancy notables  -T2DM c/b diabetic CKD   -cHTN  -h/o child with cardiac anomaly in prior pregnancy     OBHx:   (), , IOL @ 36.55 wga due to poorly controlled T1DM, c/b fetal diabetic induced cardiomyopathy and FGR   Gynhx: remote history of chlamydia, tx   PMH: T2DM c/b diabetic CKD Stage 1, cHTN   PSH: none  All: NKDA  Meds: Metformin 1000 mg  SocHx: denies tobacco, alcohol, and illicit drug use     OBJECTIVE  Visit Vitals  /74   Pulse 107   OB Status Pregnant   Smoking Status Never       Physical exam  .General: no acute distress   HEENT: normocephalic, atraumatic   Cards: RRR   Pulm: lungs CTAB   OB: FHT [place] SVE   Abdomen: soft, gravid, no fundal tenderness   : copious thick white coating of labia and thin white discharge in posterior vault, with foul smelling odor   Extremities: no lesions, skin discoloration or calf tenderness   Neuro: no focal deficits   Derm: 2-3 cm raised lesion with surrounding erythema and desqumation of surroudning skin, tender to touch     FHT: unknown viability     ASSESSMENT & PLAN    Edithallyson Martinez Delacruz is a 24 y.o.  at Unknown here for the following:    Assessment & Plan  Type 2 diabetes mellitus with stage 1 chronic kidney disease, with long-term current use of insulin (Multi)  Patient is known to M service from prior pregnancy, currently with poorly controlled BG.   BG in clinic 287. Patient did not eat breakfast today or dinner yesterday. Denies food  insecurity, but has been concerned regarding BG. We discussed the pregnancy implications of the diagnosis of T2DM including increased risk of pre-eclampsia, LGA/macrosomia, shoulder dystocia, stillbirth as well as  hypoglycemia, hyperbilirubinemia and respiratory distress.  We discussed the increased risk of congenital malformations, particularly cardiac. We reviewed the importance of glycemic control and its impact on lowering these risks. Given sub optimal control with unknown viability, recommend inpatient management for blood glucose optimatization. Patient is amendable after establishing  for daughter.   Plan for baseline HELLP labs and urine protein and TSH   Recommend fetal ECHO starting at 18-22 wga  Recommend maternal ECHO  Recommend A1c qtrimester   Recommend serial growth US in third trimester and twice weekly  testing starting at 32 weeks     Pregnancy, location unknown ([facility])  Discussed with patient concerns for pregnancy of unknown location given last Hcg of 85 and TVUS that did not demonstrate an IUP. Plan to repeat TVUS and Hcg   Chronic hypertension in pregnancy ([facility])  We did not discuss cHTN in detail, as patient denied history of cHTN, however upon chart review, she has cHTN in prior pregnancies, patient is currently not on BP meds and has normotensive BP and is asymptomatic for pre-eclampsia. Baseline HELLP labs will be obtained during inpatient blood sugar optimization   Vaginitis affecting pregnancy, antepartum  Wet prep, GC/CT swabs collected  Dysuria during pregnancy, antepartum ([facility])  UA ordered     Thank you for allowing us to participate in the care of your patient. Given her medical complexity we will transfer her care to our service. Please do not hesitate to contact us with any questions    Dispo: patient to present to [facility] for inpatient admission for blood glucose optimization     Seen and d/w Dr. Feroz Dominguez MD , PGY-3   Maternal Fetal  Medicine      Attestation signed by Otto Redman MD at 1/29/2025 12:52 PM:  I saw and evaluated the patient. I personally obtained the key and critical portions of the history and physical exam or was physically present for key and critical portions performed by the resident/fellow. I reviewed the resident/fellow's documentation and discussed the patient with the resident/fellow. I agree with the resident/fellow's medical decision making as documented in the note.    Counseled as above.  Currently with very early pregnancy/PUL.  Offered inpatient glucose regulation and patient desires.  Sent for direct admission as well as drainage of suspected abscess as above.    I spent 60 minutes in the professional and overall care of this patient.    Otto Redman MD  Maternal Fetal Medicine

## 2025-01-28 NOTE — ED TRIAGE NOTES
PT sent by OB for hospitalization. States she need to get her diabetes under control. Pt also has abscess on buttocks that ruptured. Pt is pregnant unsure how many weeks OB

## 2025-01-29 ENCOUNTER — APPOINTMENT (OUTPATIENT)
Dept: RADIOLOGY | Facility: HOSPITAL | Age: 25
End: 2025-01-29
Payer: MEDICARE

## 2025-01-29 PROBLEM — O24.111 TYPE 2 DIABETES MELLITUS AFFECTING PREGNANCY IN FIRST TRIMESTER, ANTEPARTUM (HHS-HCC): Status: ACTIVE | Noted: 2025-01-29

## 2025-01-29 PROBLEM — O99.810: Status: ACTIVE | Noted: 2025-01-29

## 2025-01-29 LAB
ALBUMIN SERPL BCP-MCNC: 3.4 G/DL (ref 3.4–5)
ALP SERPL-CCNC: 105 U/L (ref 33–110)
ALT SERPL W P-5'-P-CCNC: 7 U/L (ref 7–45)
ANION GAP SERPL CALC-SCNC: 13 MMOL/L (ref 10–20)
APPEARANCE UR: CLEAR
AST SERPL W P-5'-P-CCNC: 13 U/L (ref 9–39)
B-OH-BUTYR SERPL-SCNC: 0.15 MMOL/L (ref 0.02–0.27)
BACTERIA #/AREA URNS HPF: ABNORMAL /HPF
BACTERIA UR CULT: ABNORMAL
BILIRUB SERPL-MCNC: 0.3 MG/DL (ref 0–1.2)
BILIRUB UR QL STRIP: NEGATIVE
BUN SERPL-MCNC: 7 MG/DL (ref 6–23)
CALCIUM SERPL-MCNC: 9.3 MG/DL (ref 8.6–10.6)
CHLORIDE SERPL-SCNC: 103 MMOL/L (ref 98–107)
CO2 SERPL-SCNC: 24 MMOL/L (ref 21–32)
COLOR UR: YELLOW
CREAT SERPL-MCNC: 0.47 MG/DL (ref 0.5–1.05)
CREAT UR-MCNC: 142.1 MG/DL (ref 20–320)
EGFRCR SERPLBLD CKD-EPI 2021: >90 ML/MIN/1.73M*2
GLUCOSE BLD MANUAL STRIP-MCNC: 127 MG/DL (ref 74–99)
GLUCOSE BLD MANUAL STRIP-MCNC: 173 MG/DL (ref 74–99)
GLUCOSE BLD MANUAL STRIP-MCNC: 211 MG/DL (ref 74–99)
GLUCOSE BLD MANUAL STRIP-MCNC: 227 MG/DL (ref 74–99)
GLUCOSE BLD MANUAL STRIP-MCNC: 235 MG/DL (ref 74–99)
GLUCOSE BLD MANUAL STRIP-MCNC: 236 MG/DL (ref 74–99)
GLUCOSE BLD MANUAL STRIP-MCNC: 280 MG/DL (ref 74–99)
GLUCOSE BLD MANUAL STRIP-MCNC: 89 MG/DL (ref 74–99)
GLUCOSE SERPL-MCNC: 227 MG/DL (ref 74–99)
GLUCOSE UR QL STRIP: ABNORMAL
HBV CORE AB SER QL: NONREACTIVE
HBV SURFACE AB SER-ACNC: <3.1 MIU/ML
HBV SURFACE AG SERPL QL IA: NONREACTIVE
HCV AB SER QL: NONREACTIVE
HGB UR QL STRIP: NEGATIVE
HIV 1+2 AB+HIV1 P24 AG SERPL QL IA: NONREACTIVE
HOLD SPECIMEN: NORMAL
HYALINE CASTS #/AREA URNS LPF: ABNORMAL /LPF
KETONES UR QL STRIP: ABNORMAL
LEUKOCYTE ESTERASE UR QL STRIP: ABNORMAL
NITRITE UR QL STRIP: NEGATIVE
PH UR STRIP: 6 [PH] (ref 5–8)
POTASSIUM SERPL-SCNC: 3.3 MMOL/L (ref 3.5–5.3)
PROT SERPL-MCNC: 7 G/DL (ref 6.4–8.2)
PROT UR QL STRIP: ABNORMAL
PROT UR-ACNC: 67 MG/DL (ref 5–24)
PROT/CREAT UR: 0.47 MG/MG CREAT (ref 0–0.17)
RBC #/AREA URNS HPF: ABNORMAL /HPF
RUBV IGG SERPL IA-ACNC: 0.4 IA
RUBV IGG SERPL QL IA: NEGATIVE
SERVICE CMNT-IMP: ABNORMAL
SODIUM SERPL-SCNC: 137 MMOL/L (ref 136–145)
SP GR UR STRIP: 1.04 (ref 1–1.03)
SQUAMOUS #/AREA URNS HPF: ABNORMAL /HPF
TREPONEMA PALLIDUM IGG+IGM AB [PRESENCE] IN SERUM OR PLASMA BY IMMUNOASSAY: NONREACTIVE
WBC #/AREA URNS HPF: ABNORMAL /HPF

## 2025-01-29 PROCEDURE — 99223 1ST HOSP IP/OBS HIGH 75: CPT

## 2025-01-29 PROCEDURE — G0378 HOSPITAL OBSERVATION PER HR: HCPCS

## 2025-01-29 PROCEDURE — 2500000005 HC RX 250 GENERAL PHARMACY W/O HCPCS

## 2025-01-29 PROCEDURE — 1120000001 HC OB PRIVATE ROOM DAILY

## 2025-01-29 PROCEDURE — 2500000001 HC RX 250 WO HCPCS SELF ADMINISTERED DRUGS (ALT 637 FOR MEDICARE OP)

## 2025-01-29 PROCEDURE — 82947 ASSAY GLUCOSE BLOOD QUANT: CPT

## 2025-01-29 PROCEDURE — 2500000004 HC RX 250 GENERAL PHARMACY W/ HCPCS (ALT 636 FOR OP/ED)

## 2025-01-29 PROCEDURE — 87491 CHLMYD TRACH DNA AMP PROBE: CPT

## 2025-01-29 PROCEDURE — 2500000002 HC RX 250 W HCPCS SELF ADMINISTERED DRUGS (ALT 637 FOR MEDICARE OP, ALT 636 FOR OP/ED)

## 2025-01-29 PROCEDURE — 72193 CT PELVIS W/DYE: CPT | Performed by: SURGERY

## 2025-01-29 PROCEDURE — 82010 KETONE BODYS QUAN: CPT

## 2025-01-29 PROCEDURE — 72193 CT PELVIS W/DYE: CPT

## 2025-01-29 PROCEDURE — 36415 COLL VENOUS BLD VENIPUNCTURE: CPT

## 2025-01-29 PROCEDURE — 80053 COMPREHEN METABOLIC PANEL: CPT

## 2025-01-29 PROCEDURE — 99221 1ST HOSP IP/OBS SF/LOW 40: CPT | Performed by: SURGERY

## 2025-01-29 PROCEDURE — 2500000002 HC RX 250 W HCPCS SELF ADMINISTERED DRUGS (ALT 637 FOR MEDICARE OP, ALT 636 FOR OP/ED): Performed by: STUDENT IN AN ORGANIZED HEALTH CARE EDUCATION/TRAINING PROGRAM

## 2025-01-29 PROCEDURE — 87086 URINE CULTURE/COLONY COUNT: CPT

## 2025-01-29 PROCEDURE — 82570 ASSAY OF URINE CREATININE: CPT

## 2025-01-29 PROCEDURE — 2550000001 HC RX 255 CONTRASTS: Performed by: STUDENT IN AN ORGANIZED HEALTH CARE EDUCATION/TRAINING PROGRAM

## 2025-01-29 RX ORDER — POLYETHYLENE GLYCOL 3350 17 G/17G
17 POWDER, FOR SOLUTION ORAL 2 TIMES DAILY PRN
Status: ACTIVE | OUTPATIENT
Start: 2025-01-29

## 2025-01-29 RX ORDER — TERCONAZOLE 4 MG/G
1 CREAM VAGINAL NIGHTLY
Status: DISCONTINUED | OUTPATIENT
Start: 2025-01-29 | End: 2025-01-29

## 2025-01-29 RX ORDER — POTASSIUM CHLORIDE 20 MEQ/1
40 TABLET, EXTENDED RELEASE ORAL ONCE
Status: COMPLETED | OUTPATIENT
Start: 2025-01-29 | End: 2025-01-29

## 2025-01-29 RX ORDER — ONDANSETRON 4 MG/1
4 TABLET, FILM COATED ORAL EVERY 6 HOURS PRN
Status: ACTIVE | OUTPATIENT
Start: 2025-01-29

## 2025-01-29 RX ORDER — DEXTROSE 50 % IN WATER (D50W) INTRAVENOUS SYRINGE
12.5
Status: DISCONTINUED | OUTPATIENT
Start: 2025-01-29 | End: 2025-01-31

## 2025-01-29 RX ORDER — INSULIN LISPRO 100 [IU]/ML
10 INJECTION, SOLUTION INTRAVENOUS; SUBCUTANEOUS
Status: DISCONTINUED | OUTPATIENT
Start: 2025-01-29 | End: 2025-01-30

## 2025-01-29 RX ORDER — SIMETHICONE 80 MG
80 TABLET,CHEWABLE ORAL 4 TIMES DAILY PRN
Status: ACTIVE | OUTPATIENT
Start: 2025-01-29

## 2025-01-29 RX ORDER — INSULIN GLARGINE 100 [IU]/ML
20 INJECTION, SOLUTION SUBCUTANEOUS EVERY 24 HOURS
Status: DISCONTINUED | OUTPATIENT
Start: 2025-01-29 | End: 2025-01-30

## 2025-01-29 RX ORDER — DEXTROSE 40 %
15 GEL (GRAM) ORAL
Status: DISCONTINUED | OUTPATIENT
Start: 2025-01-29 | End: 2025-01-31

## 2025-01-29 RX ORDER — INSULIN GLARGINE 100 [IU]/ML
32 INJECTION, SOLUTION SUBCUTANEOUS EVERY 24 HOURS
Status: DISCONTINUED | OUTPATIENT
Start: 2025-01-30 | End: 2025-01-30

## 2025-01-29 RX ORDER — INSULIN GLARGINE 100 [IU]/ML
32 INJECTION, SOLUTION SUBCUTANEOUS EVERY 24 HOURS
Status: DISCONTINUED | OUTPATIENT
Start: 2025-01-29 | End: 2025-01-29

## 2025-01-29 RX ORDER — LIDOCAINE HYDROCHLORIDE AND EPINEPHRINE 10; 10 UG/ML; MG/ML
20 INJECTION, SOLUTION INFILTRATION; PERINEURAL ONCE
Status: COMPLETED | OUTPATIENT
Start: 2025-01-29 | End: 2025-01-29

## 2025-01-29 RX ORDER — DEXTROSE 50 % IN WATER (D50W) INTRAVENOUS SYRINGE
25
Status: DISCONTINUED | OUTPATIENT
Start: 2025-01-29 | End: 2025-01-31

## 2025-01-29 RX ORDER — ACETAMINOPHEN 325 MG/1
975 TABLET ORAL EVERY 6 HOURS PRN
Status: DISPENSED | OUTPATIENT
Start: 2025-01-29

## 2025-01-29 RX ORDER — ADHESIVE BANDAGE
10 BANDAGE TOPICAL
Status: ACTIVE | OUTPATIENT
Start: 2025-01-29

## 2025-01-29 RX ORDER — ACETAMINOPHEN 500 MG
1 TABLET ORAL DAILY
Qty: 1 EACH | Refills: 0 | Status: SHIPPED | OUTPATIENT
Start: 2025-01-29

## 2025-01-29 RX ORDER — LIDOCAINE HYDROCHLORIDE AND EPINEPHRINE 10; 10 UG/ML; MG/ML
INJECTION, SOLUTION INFILTRATION; PERINEURAL
Status: COMPLETED
Start: 2025-01-29 | End: 2025-01-29

## 2025-01-29 RX ORDER — DEXTROSE 40 %
30 GEL (GRAM) ORAL
Status: DISCONTINUED | OUTPATIENT
Start: 2025-01-29 | End: 2025-01-31

## 2025-01-29 RX ORDER — ONDANSETRON HYDROCHLORIDE 2 MG/ML
4 INJECTION, SOLUTION INTRAVENOUS EVERY 6 HOURS PRN
Status: ACTIVE | OUTPATIENT
Start: 2025-01-29

## 2025-01-29 RX ORDER — VANCOMYCIN HYDROCHLORIDE 1 G/20ML
INJECTION, POWDER, LYOPHILIZED, FOR SOLUTION INTRAVENOUS DAILY PRN
Status: DISCONTINUED | OUTPATIENT
Start: 2025-01-29 | End: 2025-02-01

## 2025-01-29 RX ORDER — INSULIN LISPRO 100 [IU]/ML
0-5 INJECTION, SOLUTION INTRAVENOUS; SUBCUTANEOUS
Status: DISCONTINUED | OUTPATIENT
Start: 2025-01-29 | End: 2025-01-29

## 2025-01-29 RX ORDER — HYDROMORPHONE HYDROCHLORIDE 1 MG/ML
0.4 INJECTION, SOLUTION INTRAMUSCULAR; INTRAVENOUS; SUBCUTANEOUS ONCE
Status: COMPLETED | OUTPATIENT
Start: 2025-01-29 | End: 2025-01-29

## 2025-01-29 RX ORDER — MICONAZOLE NITRATE 2 %
1 CREAM WITH APPLICATOR VAGINAL NIGHTLY
Status: DISPENSED | OUTPATIENT
Start: 2025-01-29 | End: 2025-02-05

## 2025-01-29 RX ADMIN — LIDOCAINE HYDROCHLORIDE AND EPINEPHRINE 20 ML: 10; 10 INJECTION, SOLUTION INFILTRATION; PERINEURAL at 04:41

## 2025-01-29 RX ADMIN — INSULIN GLARGINE 20 UNITS: 100 INJECTION, SOLUTION SUBCUTANEOUS at 16:03

## 2025-01-29 RX ADMIN — PRENATAL VIT W/ FE FUMARATE-FA TAB 27-0.8 MG 1 TABLET: 27-0.8 TAB at 09:42

## 2025-01-29 RX ADMIN — LIDOCAINE HYDROCHLORIDE,EPINEPHRINE BITARTRATE 20 ML: 10; .01 INJECTION, SOLUTION INFILTRATION; PERINEURAL at 04:41

## 2025-01-29 RX ADMIN — PIPERACILLIN SODIUM AND TAZOBACTAM SODIUM 3.38 G: 3; .375 INJECTION, SOLUTION INTRAVENOUS at 21:12

## 2025-01-29 RX ADMIN — HYDROMORPHONE HYDROCHLORIDE 0.4 MG: 1 INJECTION, SOLUTION INTRAMUSCULAR; INTRAVENOUS; SUBCUTANEOUS at 13:52

## 2025-01-29 RX ADMIN — VANCOMYCIN HYDROCHLORIDE 1500 MG: 5 INJECTION, POWDER, LYOPHILIZED, FOR SOLUTION INTRAVENOUS at 12:11

## 2025-01-29 RX ADMIN — MICONAZOLE NITRATE 1 APPLICATION: 20 CREAM VAGINAL at 21:12

## 2025-01-29 RX ADMIN — IOHEXOL 80 ML: 350 INJECTION, SOLUTION INTRAVENOUS at 02:31

## 2025-01-29 RX ADMIN — INSULIN GLARGINE 32 UNITS: 100 INJECTION, SOLUTION SUBCUTANEOUS at 01:46

## 2025-01-29 RX ADMIN — PIPERACILLIN SODIUM AND TAZOBACTAM SODIUM 3.38 G: 3; .375 INJECTION, SOLUTION INTRAVENOUS at 14:50

## 2025-01-29 RX ADMIN — INSULIN LISPRO 10 UNITS: 100 INJECTION, SOLUTION INTRAVENOUS; SUBCUTANEOUS at 14:44

## 2025-01-29 RX ADMIN — VANCOMYCIN HYDROCHLORIDE 1500 MG: 5 INJECTION, POWDER, LYOPHILIZED, FOR SOLUTION INTRAVENOUS at 23:51

## 2025-01-29 RX ADMIN — INSULIN LISPRO 1 UNITS: 100 INJECTION, SOLUTION INTRAVENOUS; SUBCUTANEOUS at 09:55

## 2025-01-29 RX ADMIN — POTASSIUM CHLORIDE 40 MEQ: 1500 TABLET, EXTENDED RELEASE ORAL at 11:00

## 2025-01-29 SDOH — ECONOMIC STABILITY: TRANSPORTATION INSECURITY

## 2025-01-29 SDOH — ECONOMIC STABILITY: FOOD INSECURITY: WITHIN THE PAST 12 MONTHS, YOU WORRIED THAT YOUR FOOD WOULD RUN OUT BEFORE YOU GOT MONEY TO BUY MORE.: NEVER TRUE

## 2025-01-29 SDOH — ECONOMIC STABILITY: FOOD INSECURITY: WITHIN THE PAST 12 MONTHS, YOU WORRIED THAT YOUR FOOD WOULD RUN OUT BEFORE YOU GOT THE MONEY TO BUY MORE.: NEVER TRUE

## 2025-01-29 SDOH — ECONOMIC STABILITY: FOOD INSECURITY

## 2025-01-29 SDOH — ECONOMIC STABILITY: FOOD INSECURITY: WITHIN THE PAST 12 MONTHS, THE FOOD YOU BOUGHT JUST DIDN'T LAST AND YOU DIDN'T HAVE MONEY TO GET MORE.: NEVER TRUE

## 2025-01-29 SDOH — ECONOMIC STABILITY: TRANSPORTATION INSECURITY: IN THE PAST 12 MONTHS, HAS LACK OF TRANSPORTATION KEPT YOU FROM MEDICAL APPOINTMENTS OR FROM GETTING MEDICATIONS?: NO

## 2025-01-29 ASSESSMENT — ACTIVITIES OF DAILY LIVING (ADL)
HEARING_LEFT_EAR: NO PROBLEMS
TOILETING: INDEPENDENT
ADEQUATE_TO_COMPLETE_ADL: YES
HOW_WELL_CAN_YOU_COMPLETE_GROOMING_TASKS: INDEPENDENTLY
DRESSING: INDEPENDENT
BATHING: INDEPENDENT
WALKS_IN_HOME: INDEPENDENTLY
HEARING_RIGHT_EAR: NO PROBLEMS
ADL_BEFORE_ADMISSION: RIGHT
HOW_WELL_CAN_YOU_DRESS_YOURSELF: INDEPENDENTLY
LACK_OF_TRANSPORTATION: NO
FEEDING: INDEPENDENT
HOW_WELL_CAN_YOU_BATHE_YOURSELF: INDEPENDENTLY
HOW_WELL_CAN_YOU_USE_BATHROOM_BY_YOURSELF: INDEPENDENTLY
ADEQUATE_TO_COMPLETE_ADL: YES
ADL_BEFORE_ADMISSION: INDEPENDENTLY
HOW_WELL_CAN_YOU_FEED_YOURSELF: INDEPENDENTLY

## 2025-01-29 ASSESSMENT — PAIN - FUNCTIONAL ASSESSMENT
PAIN_FUNCTIONAL_ASSESSMENT: 0-10

## 2025-01-29 ASSESSMENT — PAIN SCALES - GENERAL
PAINLEVEL_OUTOF10: 10 - WORST POSSIBLE PAIN
PAINLEVEL_OUTOF10: 0 - NO PAIN

## 2025-01-29 NOTE — ASSESSMENT & PLAN NOTE
We did not discuss cHTN in detail, as patient denied history of cHTN, however upon chart review, she has cHTN in prior pregnancies, patient is currently not on BP meds and has normotensive BP and is asymptomatic for pre-eclampsia. Baseline HELLP labs will be obtained during inpatient blood sugar optimization

## 2025-01-29 NOTE — SIGNIFICANT EVENT
25 yo  at 5.3 wga by first trimester US presenting to ED from clinic in setting of poorly controlled T2DM and left gluteal abscess.     Discussed with patient recommendation for CT scan for better evaluation of gluteal abscess. We discussed that one CT scan is at a dose much lower than the exposure associated with fetal harm. For example, the fetal radiation dose of one CXR is 0.0005-0.01 mGy, and the threshold dose of radiation-induced teratogenesis is  mGy. Patient expressed understanding and consents to continuing with CT scan.     Bunny Moctezuma MD, PGY-3

## 2025-01-29 NOTE — H&P
Obstetric H&P    HPI: 23 yo  at 5.4 wga by first trimester US with PMH uncontrolled pre gestational diabetes with CKD and cHTN presenting from office for glycemic optimization and evaluation of left gluteal cleft abscess. Patient previously followed with  Endocrinology. At last visit in 2024, recommended regimen of Lantus 32 units, Humalog 12 units before meals and Metformin 1000 mg AM. Reports not taking insulin for over a year. Also reports having a painful lump in her left gluteal cleft that she noticed two days prior. Denies VB, LOF, and cramping.     Pregnancy notable for:   - T2DM c/b diabetic CKD, has not taken insulin for over a year   - cHTN, not on meds but previously on lisinopril   - h/o child with cardiac anomaly and FGR in prior pregnancy     Obstetrical History    at 36 weeks, IOL in s/o poorly controlled T1DM, c/b fetal diabetic induced cardiomyopathy and FGR     Past Medical History  Past Medical History:   Diagnosis Date    Abnormal fetal echocardiogram affecting antepartum care of mother ([facility]) 2023    Abnormal vaginal bleeding 2023    Chronic hypertension 2023    Diabetes (Multi)     HTN (hypertension)     HTN in pregnancy, chronic ([facility]) 2023    Poor fetal growth affecting management of mother ([facility]) 2023    Pregnancy ([facility]) 2023        Past Surgical History   History reviewed. No pertinent surgical history.    Allergies  No Known Allergies    Medications  (Not in a hospital admission)      OBJECTIVE:   /63 (BP Location: Right arm, Patient Position: Lying)   Pulse 66   Temp 37.1 °C (98.8 °F) (Oral)   Resp 18   Ht 1.524 m (5')   Wt 91.2 kg (201 lb)   SpO2 99%   BMI 39.26 kg/m²    Temp  Min: 36.4 °C (97.5 °F)  Max: 37.1 °C (98.8 °F)  Pulse  Min: 66  Max: 112  BP  Min: 102/63  Max: 120/74    Physical exam:  General: AAOx3, No acute distress  Cardiovascular: Warm and well perfused  Respiratory: Normal respiratory  effort on RA   Buttocks: 13 cm area of induration with surrounding erythema of left gluteal cleft with purulent discharge draining from center of lesion     Labs:   Results for orders placed or performed during the hospital encounter of 01/28/25 (from the past 24 hours)   POCT GLUCOSE   Result Value Ref Range    POCT Glucose 315 (H) 74 - 99 mg/dL   Urinalysis with Reflex Culture and Microscopic   Result Value Ref Range    Color, Urine Light-Yellow Light-Yellow, Yellow, Dark-Yellow    Appearance, Urine Clear Clear    Specific Gravity, Urine 1.034 1.005 - 1.035    pH, Urine 6.0 5.0, 5.5, 6.0, 6.5, 7.0, 7.5, 8.0    Protein, Urine 30 (1+) (A) NEGATIVE, 10 (TRACE), 20 (TRACE) mg/dL    Glucose, Urine OVER (4+) (A) Normal mg/dL    Blood, Urine NEGATIVE NEGATIVE    Ketones, Urine 20 (1+) (A) NEGATIVE mg/dL    Bilirubin, Urine NEGATIVE NEGATIVE    Urobilinogen, Urine 2 (1+) (A) Normal mg/dL    Nitrite, Urine NEGATIVE NEGATIVE    Leukocyte Esterase, Urine NEGATIVE NEGATIVE   Extra Urine Gray Tube   Result Value Ref Range    Extra Tube Hold for add-ons.    Urinalysis Microscopic   Result Value Ref Range    WBC, Urine 1-5 1-5, NONE /HPF    RBC, Urine 3-5 NONE, 1-2, 3-5 /HPF    Squamous Epithelial Cells, Urine 1-9 (SPARSE) Reference range not established. /HPF   BLOOD GAS VENOUS FULL PANEL   Result Value Ref Range    POCT pH, Venous 7.38 7.33 - 7.43 pH    POCT pCO2, Venous 39 (L) 41 - 51 mm Hg    POCT pO2, Venous 43 35 - 45 mm Hg    POCT SO2, Venous 73 45 - 75 %    POCT Oxy Hemoglobin, Venous 71.4 45.0 - 75.0 %    POCT Hematocrit Calculated, Venous 34.0 (L) 36.0 - 46.0 %    POCT Sodium, Venous 133 (L) 136 - 145 mmol/L    POCT Potassium, Venous 3.5 3.5 - 5.3 mmol/L    POCT Chloride, Venous 99 98 - 107 mmol/L    POCT Ionized Calicum, Venous 1.24 1.10 - 1.33 mmol/L    POCT Glucose, Venous 353 (H) 74 - 99 mg/dL    POCT Lactate, Venous 1.2 0.4 - 2.0 mmol/L    POCT Base Excess, Venous -1.8 -2.0 - 3.0 mmol/L    POCT HCO3 Calculated,  Venous 23.1 22.0 - 26.0 mmol/L    POCT Hemoglobin, Venous 11.4 (L) 12.0 - 16.0 g/dL    POCT Anion Gap, Venous 14.0 10.0 - 25.0 mmol/L    Patient Temperature 37.0 degrees Celsius    FiO2 21 %   Beta Hydroxybutyrate   Result Value Ref Range    Beta-Hydroxybutyrate 0.37 (H) 0.02 - 0.27 mmol/L   CBC and Auto Differential   Result Value Ref Range    WBC 11.0 4.4 - 11.3 x10*3/uL    nRBC 0.0 0.0 - 0.0 /100 WBCs    RBC 4.64 4.00 - 5.20 x10*6/uL    Hemoglobin 10.8 (L) 12.0 - 16.0 g/dL    Hematocrit 32.7 (L) 36.0 - 46.0 %    MCV 71 (L) 80 - 100 fL    MCH 23.3 (L) 26.0 - 34.0 pg    MCHC 33.0 32.0 - 36.0 g/dL    RDW 12.4 11.5 - 14.5 %    Platelets 477 (H) 150 - 450 x10*3/uL    Neutrophils % 72.5 40.0 - 80.0 %    Immature Granulocytes %, Automated 0.5 0.0 - 0.9 %    Lymphocytes % 18.8 13.0 - 44.0 %    Monocytes % 7.3 2.0 - 10.0 %    Eosinophils % 0.5 0.0 - 6.0 %    Basophils % 0.4 0.0 - 2.0 %    Neutrophils Absolute 7.97 (H) 1.20 - 7.70 x10*3/uL    Immature Granulocytes Absolute, Automated 0.05 0.00 - 0.70 x10*3/uL    Lymphocytes Absolute 2.06 1.20 - 4.80 x10*3/uL    Monocytes Absolute 0.80 0.10 - 1.00 x10*3/uL    Eosinophils Absolute 0.05 0.00 - 0.70 x10*3/uL    Basophils Absolute 0.04 0.00 - 0.10 x10*3/uL   Comprehensive metabolic panel   Result Value Ref Range    Glucose 330 (H) 74 - 99 mg/dL    Sodium 134 (L) 136 - 145 mmol/L    Potassium 3.5 3.5 - 5.3 mmol/L    Chloride 99 98 - 107 mmol/L    Bicarbonate 25 21 - 32 mmol/L    Anion Gap 14 10 - 20 mmol/L    Urea Nitrogen 8 6 - 23 mg/dL    Creatinine 0.60 0.50 - 1.05 mg/dL    eGFR >90 >60 mL/min/1.73m*2    Calcium 9.5 8.6 - 10.6 mg/dL    Albumin 3.8 3.4 - 5.0 g/dL    Alkaline Phosphatase 110 33 - 110 U/L    Total Protein 7.9 6.4 - 8.2 g/dL    AST 14 9 - 39 U/L    Bilirubin, Total 0.4 0.0 - 1.2 mg/dL    ALT 8 7 - 45 U/L   hCG, quantitative, pregnancy   Result Value Ref Range    HCG, Beta-Quantitative 5,032 (H) <5 mIU/mL   POCT pregnancy, urine   Result Value Ref Range    Preg  Test, Ur Positive (A) Negative   POCT GLUCOSE   Result Value Ref Range    POCT Glucose 284 (H) 74 - 99 mg/dL   POCT GLUCOSE   Result Value Ref Range    POCT Glucose 235 (H) 74 - 99 mg/dL   POCT GLUCOSE   Result Value Ref Range    POCT Glucose 211 (H) 74 - 99 mg/dL     Assessment & Plan  Type 2 diabetes mellitus affecting pregnancy in first trimester, antepartum ([facility])  25 yo  at 5.4 wga by first trimester US with PMH uncontrolled pre gestational diabetes with CKD and cHTN presenting from office for glycemic optimization and evaluation of left gluteal cleft abscess.     Uncontrolled pre-gestational diabetes   - Most recent A1C 16.9% in 2024, patient reports no insulin use for over a year   - Labs on admission: , anion gap of 10, BHB slightly elevated to 0.37, pH 7.38 therefore low concern for DKA   - Endocrinology most recent recs for Lantus 32 units, Humalog 12 units before meals and Metformin 1000 mg AM however patient reports not taking. Planning to restart Lantus 32 units at bedtime  - For fasting, pre prandial and 1 hour post prandial BGs  - Repeat A1C ordered and pending, for repeat BHB and CMP in the morning     Left Gluteal Cleft Abscess   - CT pelvis notable for marked subcutaneous soft tissue stranding/skin thickening extending to the perineal region with mildly enlarged left inguinal lymph nodes with c/f an anal fistula tract   - ACS consulted appreciate recs     Routine prenatal care   - TVUS obtained on  notable for GS with MSD of 7 mm w/o fetal pole. For repeat TVUS in at least 2 weeks   - New OB labs ordered     Dispo: pending glycemic control     Discussed with and seen by Dr. De, insulin regimen discussed with Dr. Pawan Moctezuma MD   [facility]  Pager [phone]

## 2025-01-29 NOTE — ASSESSMENT & PLAN NOTE
Patient is known to [facility] service from prior pregnancy, currently with poorly controlled BG.   BG in clinic 287. Patient did not eat breakfast today or dinner yesterday. Denies food insecurity, but has been concerned regarding BG. We discussed the pregnancy implications of the diagnosis of T2DM including increased risk of pre-eclampsia, LGA/macrosomia, shoulder dystocia, stillbirth as well as  hypoglycemia, hyperbilirubinemia and respiratory distress.  We discussed the increased risk of congenital malformations, particularly cardiac. We reviewed the importance of glycemic control and its impact on lowering these risks. Given sub optimal control with unknown viability, recommend inpatient management for blood glucose optimatization. Patient is amendable after establishing  for daughter.   Plan for baseline HELLP labs and urine protein and TSH   Recommend fetal ECHO starting at 18-22 wga  Recommend maternal ECHO  Recommend A1c qtrimester   Recommend serial growth US in third trimester and twice weekly  testing starting at 32 weeks

## 2025-01-29 NOTE — PROGRESS NOTES
Vancomycin Dosing by Pharmacy- INITIAL    Xavier Delacruz is a 24 y.o. year old female who Pharmacy has been consulted for vancomycin dosing for cellulitis, skin and soft tissue with a gluteal abscess. Based on the patient's indication and renal status this patient will be dosed based on a goal AUC of 400-600.     Renal function is currently stable.    Visit Vitals  /62   Pulse 68   Temp 36.5 °C (97.7 °F) (Temporal)   Resp 16        Lab Results   Component Value Date    CREATININE 0.47 (L) 2025    CREATININE 0.60 2025    CREATININE 0.72 2024    CREATININE 0.64 2023        Patient weight is as follows:   Vitals:    25 1545   Weight: 91.2 kg       Cultures:  No results found for the encounter in last 14 days.        No intake/output data recorded.  I/O during current shift:  No intake/output data recorded.    Temp (24hrs), Av.7 °C (98 °F), Min:36.4 °C (97.5 °F), Max:37.1 °C (98.8 °F)         Assessment/Plan     Patient will not be given a loading dose.  Will initiate vancomycin maintenance,  1500 mg every 12 hours.    This dosing regimen is predicted by [facility] to result in the following pharmacokinetic parameters:  Loading dose: N/A  Regimen: 1500 mg IV every 12 hours.  Start time: 10:34 on 2025  Exposure target: AUC24 (range)400-600 mg/L.hr   [id number]: 469 mg/L.hr  AUC24,ss: 549 mg/L.hr  Probability of AUC24 > 400: 81 %  Ctrough,ss: 16.4 mg/L  Probability of Ctrough,ss > 20: 34 %    Follow-up level will be ordered on  with AM labs unless clinically indicated sooner.  Will continue to monitor renal function daily while on vancomycin and order serum creatinine at least every 48 hours if not already ordered.  Follow for continued vancomycin needs, clinical response, and signs/symptoms of toxicity.       Sayra Feldman, PharmD

## 2025-01-29 NOTE — CONSULTS
Obstetrical Consult    Reason for Consult: T2DM    Assessment/Plan    25 yo  at 5.4 wga by first trimester US with PMH uncontrolled pre gestational diabetes with CKD and cHTN presenting from office for glycemic optimization and evaluation of left gluteal cleft abscess.      Uncontrolled pre-gestational diabetes   - Most recent A1C 16.9% in 2024, patient reports no insulin use for over a year   - Admission labs: , AG 10, BHB slightly elevated 0.37, pH 7.38. BHB resolved on repeat. No evidence of DKA  - Prior regimen:  Lantus 32 units, Humalog 12 units before meals and Metformin 1000 mg AM however patient reports not taking. Will restart with Lantus 32U at bedtime  - For fasting, pre prandial and 1 hour post prandial BGs  - Repeat A1C pending.      Left Gluteal Cleft Abscess   - CT pelvis notable for marked subcutaneous soft tissue stranding/skin thickening extending to the perineal region with mildly enlarged left inguinal lymph nodes with c/f an anal fistula tract   - Attempted I&D with ACS, pt unable to tolerate. Plan to re-attempt when pt more comfortable.   - Will initiate vanc/zosyn    cHTN  - on chart review, not on meds  - baseline HELLP labs wnl, P:C pending      Routine prenatal care   - TVUS on  notable for GS with MSD of 7 mm w/o fetal pole. For repeat TVUS in at least 2 weeks   - PNLs ordered, pending     Dispo: pending improved glycemic control      Seen and d/w Dr. Pawan Starr MD  PGY3, Obstetrics and Gynecology     Subjective   From HPI: 25 yo  at 5.4 wga by first trimester US with PMH uncontrolled pre gestational diabetes with CKD and cHTN presenting from office for glycemic optimization and evaluation of left gluteal cleft abscess.     Reports not taking insulin for over a year, fasting blood glucose as high as 300 at home. Also reports having a painful lump in her left gluteal cleft that she noticed two days prior. Denies VB, LOF, and cramping.      Pregnancy  notable for:   - T2DM c/b diabetic CKD - diagnosed age 15, follows with [facility] with neg C-peptide, AYLIN, islet Antigen 2-Ab. CGM placed this week. Last prescribed reg 10/2024 - Lantus 32U, humalog 12U TID, metformin 1000mg qAM. Has not taken insulin for over a year   - cHTN, not on meds but previously on lisinopril   - h/o child with cardiac anomaly and FGR in prior pregnancy     AM UPDATE: Pt feeling comfortable. Denies n/v. Denies cramping, VB. Reports pain in gluteus this AM is tolerable. Started on lantus 32U ovn at 0145). Denies barriers to access for prior nonadherence to insulin, denies financial, storage, transportation issues, reports she forgets to take her doses.     Obstetrical History   OB History    Para Term  AB Living   2 1   1 0 1   SAB IAB Ectopic Multiple Live Births   0       1      # Outcome Date GA Lbr Mathew/2nd Weight Sex Type Anes PTL Lv   2 Current            1  23 [id number]       BIBI      Obstetric Comments   Patient states this is her second pregnancy denies being pregnant in .       Past Medical History  Past Medical History:   Diagnosis Date    Abnormal fetal echocardiogram affecting antepartum care of mother ([facility]) 2023    Abnormal vaginal bleeding 2023    Chronic hypertension 2023    Diabetes (Multi)     HTN (hypertension)     HTN in pregnancy, chronic ([facility]) 2023    Poor fetal growth affecting management of mother ([facility]) 2023    Pregnancy ([facility]) 2023        Past Surgical History   History reviewed. No pertinent surgical history.    Social History  Social History     Tobacco Use    Smoking status: Never    Smokeless tobacco: Never   Substance Use Topics    Alcohol use: Never     Substance and Sexual Activity   Drug Use Never       Allergies  Patient has no known allergies.     Medications  Medications Prior to Admission   Medication Sig Dispense Refill Last Dose/Taking    acetaminophen (TylenoL) 325 mg tablet Take  "2 tablets (650 mg) by mouth every 6 hours if needed for mild pain (1 - 3) or fever (temp greater than 38.0 C). 30 tablet 0     acetaminophen-codeine (Tylenol w/ Codeine #3) 300-30 mg tablet Take 1 tablet by mouth every 4 hours if needed.       ammonium lactate (Lac-Hydrin) 12 % lotion Apply 1 Application topically once daily.       drospirenone, contraceptive, 4 mg (28) tablet Take 1 tablet by mouth once daily. 84 tablet 4     Easy Touch Alcohol Prep Pads pads, medicated Use 1 prior to each injection       FreeStyle Leobardo 2 [place] misc USE CONTINUOUSLY TO MONITOR BLOOD SUGARS DAILY PER DIRECTED       FreeStyle Leobardo 2 Sensor kit Apply 1 sensor to the back of upper arm remove and replace every 14 days use with device to monitor blood sugar per directed.       FreeStyle Leobardo 3 Sensor device Use asn directed to measure glucose in real time changing every 14 days 2 each 11     insulin glargine (Lantus) 100 unit/mL (3 mL) pen Inject 25 units daily before bed 15 mL 11     insulin lispro (HumaLOG) 100 unit/mL injection Inject 12 Units under the skin 3 times a day with meals. 45 mL 1     lisinopril 20 mg tablet  (Patient not taking: Reported on 1/28/2025)       metFORMIN (Glucophage) 1,000 mg tablet Take 1 tablet (1,000 mg) by mouth once daily with breakfast. 90 tablet 3     pen needle, diabetic (BD Merna 2nd Gen Pen Needle) 32 gauge x 5/32\" needle Use to inject insulin up to 5 times a day. 200 each 11     True Metrix Glucose Test Strip strip USE TO CK BLOOD SUGAR AND VIE INSULIN 7 TIMES A DAY    PER DIRECTED       valACYclovir (Valtrex) 1 gram tablet Take 1 tablet (1,000 mg) by mouth. PER DIRECTED          Objective    Last Vitals  Temp Pulse Resp BP MAP O2 Sat   36.5 °C (97.7 °F) 68 16 110/62 78 97 %     Physical Examination  General: no acute distress  HEENT: normocephalic, atraumatic  Heart: warm and well perfused  Lungs: no increased WOB  Abdomen: gravid, nontender throughout  Extremities: moving all " extremities  Neuro: awake and conversant  Psych: appropriate mood and affect     Lab Review  Lab Results   Component Value Date    WBC 11.0 01/28/2025    HGB 10.8 (L) 01/28/2025    HCT 32.7 (L) 01/28/2025     (H) 01/28/2025     Lab Results   Component Value Date    GLUCOSE 227 (H) 01/29/2025     01/29/2025    K 3.3 (L) 01/29/2025     01/29/2025    CO2 24 01/29/2025    ANIONGAP 13 01/29/2025    BUN 7 01/29/2025    CREATININE 0.47 (L) 01/29/2025    EGFR >90 01/29/2025    CALCIUM 9.3 01/29/2025    ALBUMIN 3.4 01/29/2025    PROT 7.0 01/29/2025    ALKPHOS 105 01/29/2025    ALT 7 01/29/2025    AST 13 01/29/2025    BILITOT 0.3 01/29/2025

## 2025-01-29 NOTE — CARE PLAN
Problem: Hypertensive Disorder of Pregnancy (HDP)  Goal: Minimal s/sx of HDP and BP<160/110  Outcome: Progressing     Problem: Infection  Goal: Wound will have less exudate and warmth  Outcome: Progressing  Goal: Improvement in s/sx of infection  Outcome: Progressing   The patient's goals for the shift include      The clinical goals for the shift include VSS    Vital signs stable throughout the shift, patient is without s/s of HDP.

## 2025-01-29 NOTE — SIGNIFICANT EVENT
Patient meets criteria for home monitoring of blood pressure post discharge.  Reason:  history of chronic hypertension. Met with patient to assess for availability of home BP monitor.  Patient does not have access to BP monitor at home and declined obtaining BP monitor from [facility] [facility]. Prescription sent to patient's pharmacy of choice for BP monitor and patient verbalized responsibility for obtaining her own BP monitor after discharge. Patient educated on importance of continuing to monitor BP at home, recording BP on home monitoring log and s/sx of when to call her provider.  Pt verbalized understanding the above information.

## 2025-01-29 NOTE — PROCEDURES
Patient seen and evaluated at bedside. Patient earlier had been offered I&D of left gluteal abscess but had refused. After extensive counseling on risks and benefits patient agreed to proceed with drainage. Patient was pre-treated with pain medication and after prepping in normal sterile fashion had local infiltration of 1% lidocaine. Patient expressed significant anxiety throughout procedure with movement making drainage difficult. After further conversation patient agreed to proceed. At this time patient had small extension of site of current purulent drainage with significant expression of purulent output (approx 50cc of initial output). Patient opening irrigated as able with normal saline although limited due to patient moving. Hemostat attempted to be placed for further exploration of cavity but patient unable to tolerate at bedside. Patient wound appeared hemostatic with significantly decreased purulent output.     Plan:  -Agree with continuing antibiotics   -Will re-evaluate abscess tomorrow morning to determine if would require further drainage in operating room   -ACS will continue to follow     D/w Dr. Liu Noriega MD  Pgy-2 Gen Surg  ACS [id number]

## 2025-01-29 NOTE — H&P (VIEW-ONLY)
[facility]  ACUTE CARE SURGERY - CONSULT    Patient Name: Xavier Delacruz  MRN: [id number]  Admit Date: 128  : 2000  AGE: 24 y.o.   GENDER: female  ==============================================================================  TODAY'S ASSESSMENT AND PLAN OF CARE:  ASSESSMENT:  Xavier Delacruz is a 24 y.o. female with history of T1DM, CKD, HTN  who presents to  ED for hyperglycemia, concern for DKA from clinic. ACS is consulted for gluteal abscess. Patient is in stable condition. Would recommend incision and drainage of gluteal abscess; patient initially agreeable, however at time of procedure patient requested the procedure be aborted. Will plan to re-evaluate for drainage later today per patient's request.     Plan for incision and drainage of left gluteal abscess when patient is agreeable. ABX per primary team. Discussed with patient that given her hyperglycemia there is possibility of worsening of her infection which could require a more aggressive procedure. Expressed understanding.  Agree with continued glycemic control.  ACS will continue to follow.     Discussed with Dr. Cerrato.    Bola Lopez MD  PGY-2 General Surgery  Acute Care Surgery [id number]    Subjective   ==============================================================================  CHIEF COMPLAINT/REASON FOR CONSULT:  Chief Complaint: Left gluteal abscess    HPI:  Xavier Delacruz is a 24 y.o. female with history of T1DM, CKD, HTN, current pregnancy (, currently 5w4d pregnant)  who presents to  ED for concerns of hyperglycemia. Acute care surgery is consulted for left gluteal abscess. Patient seen in her endocrine clinic today and was found to be hyperglycemic to 287, at which point she was sent to the ED. Patient reports poor compliance with insulin at home; her sugars run in the 300s at baseline. Patient's LMP was , with some spotting which is also her baseline  since her prior pregnancy. States she has noticed over the past two days purulent drainage from her left buttocks but has otherwise not had significant pain. Denies fevers/chills, chest pain, SOB. Does endorse some PO intolerance and nausea, similar to prior episodes of DKA. No other concerns.     Objective   PAST MEDICAL HISTORY:   PMH:   Past Medical History:   Diagnosis Date    Abnormal fetal echocardiogram affecting antepartum care of mother ([facility]) 08/26/2023    Abnormal vaginal bleeding 11/17/2023    Chronic hypertension 08/26/2023    Diabetes (Multi)     HTN (hypertension)     HTN in pregnancy, chronic ([facility]) 08/26/2023    Poor fetal growth affecting management of mother ([facility]) 08/26/2023    Pregnancy ([facility]) 08/26/2023     Last menstrual period: 12/22/2024    PSH: None pertinent.    FH: Noncontributory  Family History   Problem Relation Name Age of Onset    No Known Problems Other FAM HX      SOCIAL HISTORY:    Smoking: Denies   Social History     Tobacco Use   Smoking Status Never   Smokeless Tobacco Never       Alcohol: Denies   Social History     Substance and Sexual Activity   Alcohol Use Never       Drug use: Denies    MEDICATIONS: Noncompliant with insulin regimen  Prior to Admission medications    Medication Sig Start Date End Date Taking? Authorizing Provider   acetaminophen (TylenoL) 325 mg tablet Take 2 tablets (650 mg) by mouth every 6 hours if needed for mild pain (1 - 3) or fever (temp greater than 38.0 C). 11/25/23   Josemanuel Christianson PA-C   acetaminophen-codeine (Tylenol w/ Codeine #3) 300-30 mg tablet Take 1 tablet by mouth every 4 hours if needed. 3/20/23   Historical Provider, MD   ammonium lactate (Lac-Hydrin) 12 % lotion Apply 1 Application topically once daily. 2/10/22   Historical Provider, MD   drospirenone, contraceptive, 4 mg (28) tablet Take 1 tablet by mouth once daily. 10/28/24   Jamia Clark MD   Easy Touch Alcohol Prep Pads pads, medicated Use 1 prior to each  "injection 5/18/23   Historical Provider, MD   FreeStyle Leobardo 2 [place] misc USE CONTINUOUSLY TO MONITOR BLOOD SUGARS DAILY PER DIRECTED 1/19/23   Historical Provider, MD   FreeStyle Leobardo 2 Sensor kit Apply 1 sensor to the back of upper arm remove and replace every 14 days use with device to monitor blood sugar per directed. 6/13/23   Historical Provider, MD   FreeStyle Leobardo 3 Sensor device Use asn directed to measure glucose in real time changing every 14 days 10/25/24   UZMA Woods   insulin glargine (Lantus) 100 unit/mL (3 mL) pen Inject 25 units daily before bed 10/10/24   UZMA Woods   insulin lispro (HumaLOG) 100 unit/mL injection Inject 12 Units under the skin 3 times a day with meals. 4/25/24   Joselyn Oswald MD   lisinopril 20 mg tablet  1/23/25   Historical Provider, MD   metFORMIN (Glucophage) 1,000 mg tablet Take 1 tablet (1,000 mg) by mouth once daily with breakfast. 10/10/24   UZMA Woods   pen needle, diabetic (BD Merna 2nd Gen Pen Needle) 32 gauge x 5/32\" needle Use to inject insulin up to 5 times a day. 10/10/24   UZMA Woods   True Metrix Glucose Test Strip strip USE TO CK BLOOD SUGAR AND VIE INSULIN 7 TIMES A DAY    PER DIRECTED    Historical Provider, MD   valACYclovir (Valtrex) 1 gram tablet Take 1 tablet (1,000 mg) by mouth. PER DIRECTED 12/7/22   Historical Provider, MD   dulaglutide (Trulicity) 0.75 mg/0.5 mL pen injector Inject 0.75 mg under the skin 1 (one) time per week. 10/10/24 1/23/25  UZMA Woods     ALLERGIES: NKDA    REVIEW OF SYSTEMS:  A 12-point ROS was performed and was unremarkable except as above.    PHYSICAL EXAM:  GEN: No acute distress. Alert, awake and conversant.  HEENT: Sclera anicteric. Moist mucous membranes.  RESP: Breathing non-labored, equal chest rise. On RA.  CV: Regular rate, normotensive  GI: Abdomen soft, nondistended, nontender. Left gluteal abscess with cellulitic changes " and fluctuance.  : Deferred.  MSK: No gross deformities. Moves all extremities spontaneously.  NEURO: Alert and oriented x3. No focal deficits.  PSYCH: Appropriate mood and affect.  SKIN:  Abscess as above.    IMAGING SUMMARY:   Reviewed CT scan. Very early stages of pregnancy, though fetus not visualized well. Area of cellulitis/fluctuance in left buttocks consistent with her left buttocks abscess.    LABS:  Results for orders placed or performed during the hospital encounter of 01/28/25 (from the past 24 hours)   POCT GLUCOSE   Result Value Ref Range    POCT Glucose 315 (H) 74 - 99 mg/dL   Urinalysis with Reflex Culture and Microscopic   Result Value Ref Range    Color, Urine Light-Yellow Light-Yellow, Yellow, Dark-Yellow    Appearance, Urine Clear Clear    Specific Gravity, Urine 1.034 1.005 - 1.035    pH, Urine 6.0 5.0, 5.5, 6.0, 6.5, 7.0, 7.5, 8.0    Protein, Urine 30 (1+) (A) NEGATIVE, 10 (TRACE), 20 (TRACE) mg/dL    Glucose, Urine OVER (4+) (A) Normal mg/dL    Blood, Urine NEGATIVE NEGATIVE    Ketones, Urine 20 (1+) (A) NEGATIVE mg/dL    Bilirubin, Urine NEGATIVE NEGATIVE    Urobilinogen, Urine 2 (1+) (A) Normal mg/dL    Nitrite, Urine NEGATIVE NEGATIVE    Leukocyte Esterase, Urine NEGATIVE NEGATIVE   Extra Urine Gray Tube   Result Value Ref Range    Extra Tube Hold for add-ons.    Urinalysis Microscopic   Result Value Ref Range    WBC, Urine 1-5 1-5, NONE /HPF    RBC, Urine 3-5 NONE, 1-2, 3-5 /HPF    Squamous Epithelial Cells, Urine 1-9 (SPARSE) Reference range not established. /HPF   BLOOD GAS VENOUS FULL PANEL   Result Value Ref Range    POCT pH, Venous 7.38 7.33 - 7.43 pH    POCT pCO2, Venous 39 (L) 41 - 51 mm Hg    POCT pO2, Venous 43 35 - 45 mm Hg    POCT SO2, Venous 73 45 - 75 %    POCT Oxy Hemoglobin, Venous 71.4 45.0 - 75.0 %    POCT Hematocrit Calculated, Venous 34.0 (L) 36.0 - 46.0 %    POCT Sodium, Venous 133 (L) 136 - 145 mmol/L    POCT Potassium, Venous 3.5 3.5 - 5.3 mmol/L    POCT Chloride,  Venous 99 98 - 107 mmol/L    POCT Ionized Calicum, Venous 1.24 1.10 - 1.33 mmol/L    POCT Glucose, Venous 353 (H) 74 - 99 mg/dL    POCT Lactate, Venous 1.2 0.4 - 2.0 mmol/L    POCT Base Excess, Venous -1.8 -2.0 - 3.0 mmol/L    POCT HCO3 Calculated, Venous 23.1 22.0 - 26.0 mmol/L    POCT Hemoglobin, Venous 11.4 (L) 12.0 - 16.0 g/dL    POCT Anion Gap, Venous 14.0 10.0 - 25.0 mmol/L    Patient Temperature 37.0 degrees Celsius    FiO2 21 %   Beta Hydroxybutyrate   Result Value Ref Range    Beta-Hydroxybutyrate 0.37 (H) 0.02 - 0.27 mmol/L   CBC and Auto Differential   Result Value Ref Range    WBC 11.0 4.4 - 11.3 x10*3/uL    nRBC 0.0 0.0 - 0.0 /100 WBCs    RBC 4.64 4.00 - 5.20 x10*6/uL    Hemoglobin 10.8 (L) 12.0 - 16.0 g/dL    Hematocrit 32.7 (L) 36.0 - 46.0 %    MCV 71 (L) 80 - 100 fL    MCH 23.3 (L) 26.0 - 34.0 pg    MCHC 33.0 32.0 - 36.0 g/dL    RDW 12.4 11.5 - 14.5 %    Platelets 477 (H) 150 - 450 x10*3/uL    Neutrophils % 72.5 40.0 - 80.0 %    Immature Granulocytes %, Automated 0.5 0.0 - 0.9 %    Lymphocytes % 18.8 13.0 - 44.0 %    Monocytes % 7.3 2.0 - 10.0 %    Eosinophils % 0.5 0.0 - 6.0 %    Basophils % 0.4 0.0 - 2.0 %    Neutrophils Absolute 7.97 (H) 1.20 - 7.70 x10*3/uL    Immature Granulocytes Absolute, Automated 0.05 0.00 - 0.70 x10*3/uL    Lymphocytes Absolute 2.06 1.20 - 4.80 x10*3/uL    Monocytes Absolute 0.80 0.10 - 1.00 x10*3/uL    Eosinophils Absolute 0.05 0.00 - 0.70 x10*3/uL    Basophils Absolute 0.04 0.00 - 0.10 x10*3/uL   Comprehensive metabolic panel   Result Value Ref Range    Glucose 330 (H) 74 - 99 mg/dL    Sodium 134 (L) 136 - 145 mmol/L    Potassium 3.5 3.5 - 5.3 mmol/L    Chloride 99 98 - 107 mmol/L    Bicarbonate 25 21 - 32 mmol/L    Anion Gap 14 10 - 20 mmol/L    Urea Nitrogen 8 6 - 23 mg/dL    Creatinine 0.60 0.50 - 1.05 mg/dL    eGFR >90 >60 mL/min/1.73m*2    Calcium 9.5 8.6 - 10.6 mg/dL    Albumin 3.8 3.4 - 5.0 g/dL    Alkaline Phosphatase 110 33 - 110 U/L    Total Protein 7.9 6.4 -  8.2 g/dL    AST 14 9 - 39 U/L    Bilirubin, Total 0.4 0.0 - 1.2 mg/dL    ALT 8 7 - 45 U/L   hCG, quantitative, pregnancy   Result Value Ref Range    HCG, Beta-Quantitative 5,032 (H) <5 mIU/mL   POCT pregnancy, urine   Result Value Ref Range    Preg Test, Ur Positive (A) Negative   POCT GLUCOSE   Result Value Ref Range    POCT Glucose 284 (H) 74 - 99 mg/dL   POCT GLUCOSE   Result Value Ref Range    POCT Glucose 235 (H) 74 - 99 mg/dL     I/O past 24h:  No intake/output data recorded.    I have reviewed all laboratory and imaging results ordered/pertinent for this encounter.

## 2025-01-29 NOTE — ED PROVIDER NOTES
HPI   Chief Complaint   Patient presents with    Hyperglycemia       HPI    Xavier Delacruz is a 24-year-old female G2, P1 currently 5 weeks pregnant with history of type 2 diabetes presenting to the ED for hyperglycemia.  Patient states she was seen by her high risk OB provider outpatient today and was told to come to the ED to be admitted for her uncontrolled diabetes.  Patient states she is supposed to be taking 52 units of Lantus every night and sliding scale Humalog however she has not taken her insulin since last year.  Patient states her blood sugars normally run in the 300s and 400s.  Patient states she has been nauseous and vomiting for 1.5 weeks.  Patient states she has an abscess on the left glut that began 3 days ago.  Patient states the abscess began draining today and is painful.  Patient denies current chest pain, shortness of breath, abdominal pain nausea, vomiting, fevers, body aches, chills.  Patient denies abnormal vaginal bleeding and discharge.    Patient History   Past Medical History:   Diagnosis Date    Abnormal fetal echocardiogram affecting antepartum care of mother ([facility]) 08/26/2023    Abnormal vaginal bleeding 11/17/2023    Chronic hypertension 08/26/2023    Diabetes (Multi)     HTN (hypertension)     HTN in pregnancy, chronic ([facility]) 08/26/2023    Poor fetal growth affecting management of mother ([facility]) 08/26/2023    Pregnancy ([facility]) 08/26/2023     History reviewed. No pertinent surgical history.  Family History   Problem Relation Name Age of Onset    No Known Problems Other FAM HX      Social History     Tobacco Use    Smoking status: Never    Smokeless tobacco: Never   Vaping Use    Vaping status: Never Used   Substance Use Topics    Alcohol use: Never    Drug use: Never       Physical Exam   ED Triage Vitals   Temperature Heart Rate Respirations BP   01/28/25 1545 01/28/25 1545 01/28/25 1545 01/28/25 1545   36.4 °C (97.5 °F) (!) 112 18 114/79      Pulse Ox Temp Source Heart  Rate Source Patient Position   01/28/25 1545 01/28/25 2212 01/28/25 2212 --   97 % Oral Monitor       BP Location FiO2 (%)     -- --             Physical Exam  Vitals and nursing note reviewed. Exam conducted with a chaperone present.   Constitutional:       Appearance: Normal appearance.   Cardiovascular:      Rate and Rhythm: Normal rate and regular rhythm.      Heart sounds: Normal heart sounds. No murmur heard.     No gallop.   Pulmonary:      Effort: Pulmonary effort is normal. No respiratory distress.      Breath sounds: Normal breath sounds. No stridor. No wheezing, rhonchi or rales.   Abdominal:      General: Abdomen is flat. Bowel sounds are normal. There is no distension.      Palpations: Abdomen is soft. There is no mass.      Tenderness: There is no abdominal tenderness. There is no guarding or rebound.      Hernia: No hernia is present.   Genitourinary:     Comments: 13 cm x 5 cm abscess to the left glutes.  Abscess is firm and tender.  There is small area of active purulent drainage.  No erythema to the abscess  Musculoskeletal:      Right lower leg: No edema.      Left lower leg: No edema.   Skin:     General: Skin is warm and dry.   Neurological:      General: No focal deficit present.      Mental Status: She is alert and oriented to person, place, and time.   Psychiatric:         Mood and Affect: Mood normal.         Behavior: Behavior normal.           ED Course & MDM   ED Course as of 01/29/25 0600   Wed Jan 29, 2025   0023 Emergency Medicine Attending Attestation:     [unfilled]    This patient was seen by the advanced practice provider.  I have personally performed a substantive portion of the encounter.  I have seen and examined the patient; agree with the workup, evaluation, MDM, management and diagnosis.  The care plan has been discussed.      I personally saw the patient and made/approved the management plan and take responsibility for the patient management.    History: Patient is a  24-year-old female, 5 weeks pregnant, with history of diabetes who presents emergency department at the urging of her MFM Dr. To be admitted for hyperglycemia and control of diabetes.  Patient acknowledges hyperglycemia but is not currently symptomatic.  She also notes a possible abscess of her left gluteus muscle.  No fever, no nausea/vomiting, no abdominal pain, no vaginal bleeding or vaginal discharge.  No additional symptoms at this time.  Exam: On my exam, patient is sitting comfortably in the gurney no acute distress.  She has symmetric chest rise, clear breath sounds bilaterally, regular heart rate.  She has a soft benign abdominal exam.  She does have a significant amount of induration and possible erythema of the left gluteus concerning for a large abscess.  Significantly tender to palpation.  MDM: Patient presented to the emergency department for hyperglycemia in the setting of pregnancy, discussed with MFM, will plan to admit.  Prior to final admission, we will plan to investigate this gluteal abscess further.  We did discuss with MFM are concerned that this may be deeper and they felt it was appropriate to obtain a CT scan.  We will plan to discuss with the patient the risk and benefits of obtaining a CT scan and possible drainage pending results of CT scan prior to admission.    Nirmal Sommers MD   [AM]      ED Course User Index  [AM] Nirmal Sommers MD         Diagnoses as of 01/29/25 0600   Hyperglycemia during pregnancy ([facility])                 No data recorded     [place] Coma Scale Score: 15 (01/28/25 1547 : Nik Copeland RN)                           Medical Decision Making    This is a 24-year-old female presenting the ED for hyperglycemia.  Per chart review the patient was seen by [facility] outpatient today and was recommended to go to the ED for admission for management of uncontrolled diabetes. When patient initially arrived to the ED for care glucose was 315.  Patient was given 1 L LR  bolus for hyperglycemia.  Patient was also given Zofran for nausea.  Transvaginal ultrasound was obtained to confirm IUP.  Quantitative hCG obtained to ensure levels are rising appropriately for gestational age.  Beta-hCG is 5032.  VBG was obtained to rule out DKA.  Patient does not appear to be in DKA with VBG results.  pH within normal limits.  Anion gap 14.  CBC and CMP also obtained today.  CBC shows anemia with hemoglobin of 10.8.  Per chart review this is near the patient's baseline.  CMP shows low sodium of 134 and elevated glucose of 330.  UA obtained to rule out UTI.  UA shows 4+ glucose but is also not consistent with UTI.  Transvaginal ultrasound shows single intrauterine gestational sac of unknown viability corresponding to gestational age of 5 weeks 3 days.  No fetal pole or heart rate identified.  Patient states that she has an abscess to the left gluteal that began 3 days ago.  Patient states the abscess began draining today.  On exam patient has a 13 cm x 5 cm abscess to the left gluteal.  Abscess is firm and tender to palpation.  Given location of abscess there is concern for deep space infection therefore CT pelvis is recommended for further evaluation.  MFM was consulted for hyperglycemia during pregnancy.  MFM stated the patient is safe to get a CT scan.  Did discuss the risk and benefits of obtaining CT during early pregnancy and patient agrees to obtaining the CT scan.  CT pelvis was then obtained and showed gluteal abscess as well as an anal fistula tract.  ACS was consulted for findings on CT.  ACS attempted to drain the abscess in the ED today however patient's refused.  M recommended starting the patient on 32 units Lantus.  Patient has been hemodynamically stable in the emergency department today.  Patient's blood glucose did improve after receiving fluids.  Pelvic exam deferred at this time given patient denied abnormal vaginal discharge and bleeding.    Disposition: Hospital  admission  Patient is admitted to obstetrics under Dr. Villalta.  Patient agrees to hospital admission at this time.    Patient was discussed and staffed with Dr. Sommers    Procedure  Procedures     Elmer Banuelos PA-C  01/29/25 0683

## 2025-01-29 NOTE — ASSESSMENT & PLAN NOTE
23 yo  at 5.4 wga by first trimester US with PMH uncontrolled pre gestational diabetes with CKD and cHTN presenting from office for glycemic optimization and evaluation of left gluteal cleft abscess.     Uncontrolled pre-gestational diabetes   - Most recent A1C 16.9% in 2024, patient reports no insulin use for over a year   - Labs on admission: , anion gap of 10, BHB slightly elevated to 0.37, pH 7.38 therefore low concern for DKA   - Endocrinology most recent recs for Lantus 32 units, Humalog 12 units before meals and Metformin 1000 mg AM however patient reports not taking. Planning to restart Lantus 32 units at bedtime  - For fasting, pre prandial and 1 hour post prandial BGs  - Repeat A1C ordered and pending, for repeat BHB and CMP in the morning     Left Gluteal Cleft Abscess   - CT pelvis notable for marked subcutaneous soft tissue stranding/skin thickening extending to the perineal region with mildly enlarged left inguinal lymph nodes with c/f an anal fistula tract   - ACS consulted appreciate recs     Routine prenatal care   - TVUS obtained on  notable for GS with MSD of 7 mm w/o fetal pole. For repeat TVUS in at least 2 weeks   - New OB labs ordered     Dispo: pending glycemic control     Discussed with and seen by Dr. De, insulin regimen discussed with Dr. Pawan Moctezuma MD   [facility]  Pager [phone]

## 2025-01-30 PROBLEM — L02.91 ABSCESS: Status: ACTIVE | Noted: 2025-01-28

## 2025-01-30 LAB
ALBUMIN SERPL BCP-MCNC: 3 G/DL (ref 3.4–5)
ANION GAP SERPL CALC-SCNC: 12 MMOL/L (ref 10–20)
BACTERIA UR CULT: NORMAL
BASOPHILS # BLD AUTO: 0.03 X10*3/UL (ref 0–0.1)
BASOPHILS NFR BLD AUTO: 0.3 %
BUN SERPL-MCNC: 6 MG/DL (ref 6–23)
BV SCORE VAG QL: ABNORMAL
C TRACH RRNA SPEC QL NAA+PROBE: NORMAL
C TRACH RRNA SPEC QL NAA+PROBE: POSITIVE
CALCIUM SERPL-MCNC: 8.9 MG/DL (ref 8.6–10.6)
CHLORIDE SERPL-SCNC: 109 MMOL/L (ref 98–107)
CO2 SERPL-SCNC: 24 MMOL/L (ref 21–32)
CREAT SERPL-MCNC: 0.53 MG/DL (ref 0.5–1.05)
EGFRCR SERPLBLD CKD-EPI 2021: >90 ML/MIN/1.73M*2
EOSINOPHIL # BLD AUTO: 0.26 X10*3/UL (ref 0–0.7)
EOSINOPHIL NFR BLD AUTO: 2.5 %
ERYTHROCYTE [DISTWIDTH] IN BLOOD BY AUTOMATED COUNT: 12.7 % (ref 11.5–14.5)
EST. AVERAGE GLUCOSE BLD GHB EST-MCNC: 384 MG/DL
GLUCOSE BLD MANUAL STRIP-MCNC: 103 MG/DL (ref 74–99)
GLUCOSE BLD MANUAL STRIP-MCNC: 123 MG/DL (ref 74–99)
GLUCOSE BLD MANUAL STRIP-MCNC: 126 MG/DL (ref 74–99)
GLUCOSE BLD MANUAL STRIP-MCNC: 313 MG/DL (ref 74–99)
GLUCOSE BLD MANUAL STRIP-MCNC: 371 MG/DL (ref 74–99)
GLUCOSE BLD MANUAL STRIP-MCNC: 43 MG/DL (ref 74–99)
GLUCOSE BLD MANUAL STRIP-MCNC: 95 MG/DL (ref 74–99)
GLUCOSE SERPL-MCNC: 42 MG/DL (ref 74–99)
HBA1C MFR BLD: 15 %
HCT VFR BLD AUTO: 32.4 % (ref 36–46)
HGB BLD-MCNC: 9.8 G/DL (ref 12–16)
IMM GRANULOCYTES # BLD AUTO: 0.03 X10*3/UL (ref 0–0.7)
IMM GRANULOCYTES NFR BLD AUTO: 0.3 % (ref 0–0.9)
LYMPHOCYTES # BLD AUTO: 3.87 X10*3/UL (ref 1.2–4.8)
LYMPHOCYTES NFR BLD AUTO: 37.9 %
MCH RBC QN AUTO: 23.3 PG (ref 26–34)
MCHC RBC AUTO-ENTMCNC: 30.2 G/DL (ref 32–36)
MCV RBC AUTO: 77 FL (ref 80–100)
MONOCYTES # BLD AUTO: 0.82 X10*3/UL (ref 0.1–1)
MONOCYTES NFR BLD AUTO: 8 %
N GONORRHOEA DNA SPEC QL PROBE+SIG AMP: NEGATIVE
NEUTROPHILS # BLD AUTO: 5.21 X10*3/UL (ref 1.2–7.7)
NEUTROPHILS NFR BLD AUTO: 51 %
NRBC BLD-RTO: 0 /100 WBCS (ref 0–0)
PHOSPHATE SERPL-MCNC: 3 MG/DL (ref 2.5–4.9)
PLATELET # BLD AUTO: 522 X10*3/UL (ref 150–450)
POTASSIUM SERPL-SCNC: 3 MMOL/L (ref 3.5–5.3)
RBC # BLD AUTO: 4.2 X10*6/UL (ref 4–5.2)
SODIUM SERPL-SCNC: 142 MMOL/L (ref 136–145)
T VAGINALIS RRNA SPEC QL NAA+PROBE: NORMAL
TSH SERPL-ACNC: 0.8 MIU/L (ref 0.44–3.98)
WBC # BLD AUTO: 10.2 X10*3/UL (ref 4.4–11.3)
YEAST SPEC QL CULT: ABNORMAL

## 2025-01-30 PROCEDURE — 36415 COLL VENOUS BLD VENIPUNCTURE: CPT

## 2025-01-30 PROCEDURE — 2500000005 HC RX 250 GENERAL PHARMACY W/O HCPCS

## 2025-01-30 PROCEDURE — 99232 SBSQ HOSP IP/OBS MODERATE 35: CPT

## 2025-01-30 PROCEDURE — 2500000001 HC RX 250 WO HCPCS SELF ADMINISTERED DRUGS (ALT 637 FOR MEDICARE OP)

## 2025-01-30 PROCEDURE — 2500000004 HC RX 250 GENERAL PHARMACY W/ HCPCS (ALT 636 FOR OP/ED)

## 2025-01-30 PROCEDURE — 2500000002 HC RX 250 W HCPCS SELF ADMINISTERED DRUGS (ALT 637 FOR MEDICARE OP, ALT 636 FOR OP/ED)

## 2025-01-30 PROCEDURE — 1100000001 HC PRIVATE ROOM DAILY

## 2025-01-30 PROCEDURE — 85025 COMPLETE CBC W/AUTO DIFF WBC: CPT

## 2025-01-30 PROCEDURE — 82947 ASSAY GLUCOSE BLOOD QUANT: CPT

## 2025-01-30 PROCEDURE — 84443 ASSAY THYROID STIM HORMONE: CPT

## 2025-01-30 PROCEDURE — 87070 CULTURE OTHR SPECIMN AEROBIC: CPT

## 2025-01-30 PROCEDURE — 0J990ZZ DRAINAGE OF BUTTOCK SUBCUTANEOUS TISSUE AND FASCIA, OPEN APPROACH: ICD-10-PCS | Performed by: COUNSELOR

## 2025-01-30 PROCEDURE — 80069 RENAL FUNCTION PANEL: CPT

## 2025-01-30 RX ORDER — LOPERAMIDE HYDROCHLORIDE 2 MG/1
2 CAPSULE ORAL 4 TIMES DAILY PRN
Status: DISPENSED | OUTPATIENT
Start: 2025-01-30

## 2025-01-30 RX ORDER — INSULIN GLARGINE 100 [IU]/ML
20 INJECTION, SOLUTION SUBCUTANEOUS EVERY 24 HOURS
Status: DISCONTINUED | OUTPATIENT
Start: 2025-01-30 | End: 2025-01-31

## 2025-01-30 RX ORDER — POTASSIUM CHLORIDE 14.9 MG/ML
20 INJECTION INTRAVENOUS
Status: COMPLETED | OUTPATIENT
Start: 2025-01-30 | End: 2025-01-30

## 2025-01-30 RX ORDER — AZITHROMYCIN 500 MG/1
1000 TABLET, FILM COATED ORAL ONCE
Status: COMPLETED | OUTPATIENT
Start: 2025-01-30 | End: 2025-01-30

## 2025-01-30 RX ORDER — INSULIN LISPRO 100 [IU]/ML
12 INJECTION, SOLUTION INTRAVENOUS; SUBCUTANEOUS
Status: DISCONTINUED | OUTPATIENT
Start: 2025-01-30 | End: 2025-01-31

## 2025-01-30 RX ORDER — INSULIN LISPRO 100 [IU]/ML
0-15 INJECTION, SOLUTION INTRAVENOUS; SUBCUTANEOUS
Status: DISCONTINUED | OUTPATIENT
Start: 2025-01-30 | End: 2025-01-30

## 2025-01-30 RX ORDER — SODIUM CHLORIDE 9 MG/ML
125 INJECTION, SOLUTION INTRAVENOUS CONTINUOUS
Status: DISCONTINUED | OUTPATIENT
Start: 2025-01-30 | End: 2025-01-31

## 2025-01-30 RX ORDER — METFORMIN HYDROCHLORIDE 500 MG/1
500 TABLET, EXTENDED RELEASE ORAL
Status: DISCONTINUED | OUTPATIENT
Start: 2025-01-30 | End: 2025-01-31

## 2025-01-30 RX ORDER — INSULIN GLARGINE 100 [IU]/ML
32 INJECTION, SOLUTION SUBCUTANEOUS EVERY 24 HOURS
Status: DISCONTINUED | OUTPATIENT
Start: 2025-01-30 | End: 2025-01-31

## 2025-01-30 RX ORDER — INSULIN LISPRO 100 [IU]/ML
0-10 INJECTION, SOLUTION INTRAVENOUS; SUBCUTANEOUS
Status: DISCONTINUED | OUTPATIENT
Start: 2025-01-30 | End: 2025-01-31

## 2025-01-30 RX ADMIN — PRENATAL VIT W/ FE FUMARATE-FA TAB 27-0.8 MG 1 TABLET: 27-0.8 TAB at 09:19

## 2025-01-30 RX ADMIN — AZITHROMYCIN 1000 MG: 500 TABLET, FILM COATED ORAL at 18:21

## 2025-01-30 RX ADMIN — POTASSIUM CHLORIDE 20 MEQ: 14.9 INJECTION, SOLUTION INTRAVENOUS at 14:30

## 2025-01-30 RX ADMIN — PIPERACILLIN SODIUM AND TAZOBACTAM SODIUM 3.38 G: 3; .375 INJECTION, SOLUTION INTRAVENOUS at 18:20

## 2025-01-30 RX ADMIN — INSULIN LISPRO 12 UNITS: 100 INJECTION, SOLUTION INTRAVENOUS; SUBCUTANEOUS at 18:08

## 2025-01-30 RX ADMIN — INSULIN GLARGINE 32 UNITS: 100 INJECTION, SOLUTION SUBCUTANEOUS at 01:45

## 2025-01-30 RX ADMIN — INSULIN GLARGINE 20 UNITS: 100 INJECTION, SOLUTION SUBCUTANEOUS at 12:53

## 2025-01-30 RX ADMIN — VANCOMYCIN HYDROCHLORIDE 1500 MG: 5 INJECTION, POWDER, LYOPHILIZED, FOR SOLUTION INTRAVENOUS at 16:46

## 2025-01-30 RX ADMIN — METFORMIN HYDROCHLORIDE 500 MG: 500 TABLET, EXTENDED RELEASE ORAL at 17:32

## 2025-01-30 RX ADMIN — LOPERAMIDE HYDROCHLORIDE 2 MG: 2 CAPSULE ORAL at 22:57

## 2025-01-30 RX ADMIN — POTASSIUM CHLORIDE 20 MEQ: 14.9 INJECTION, SOLUTION INTRAVENOUS at 10:37

## 2025-01-30 RX ADMIN — MICONAZOLE NITRATE 1 APPLICATION: 20 CREAM VAGINAL at 20:21

## 2025-01-30 RX ADMIN — INSULIN LISPRO 12 UNITS: 100 INJECTION, SOLUTION INTRAVENOUS; SUBCUTANEOUS at 03:55

## 2025-01-30 RX ADMIN — PIPERACILLIN SODIUM AND TAZOBACTAM SODIUM 3.38 G: 3; .375 INJECTION, SOLUTION INTRAVENOUS at 03:00

## 2025-01-30 RX ADMIN — PIPERACILLIN SODIUM AND TAZOBACTAM SODIUM 3.38 G: 3; .375 INJECTION, SOLUTION INTRAVENOUS at 09:18

## 2025-01-30 RX ADMIN — METFORMIN HYDROCHLORIDE 500 MG: 500 TABLET, EXTENDED RELEASE ORAL at 10:36

## 2025-01-30 ASSESSMENT — PAIN - FUNCTIONAL ASSESSMENT: PAIN_FUNCTIONAL_ASSESSMENT: 0-10

## 2025-01-30 ASSESSMENT — PAIN SCALES - GENERAL
PAINLEVEL_OUTOF10: 0 - NO PAIN

## 2025-01-30 NOTE — PROGRESS NOTES
ANTEPARTUM PROGRESS NOTE   2025, 6:53 AM     SUBJECTIVE:   Pt feeling well this AM. Denies abd pain or VB. Reports eating tater tots and chicken tenders yesterday around 2100. Had PB&J around 2300 without short-acting insulin, postprandial 371. Received lantus at 0200, POCT at 0300 313, received 12u lispro from sliding scale at 0400.   Hypoglycemic this AM to 43. Was given cookies and juice from nursing.     OBJECTIVE:   BP 90/55   Pulse 68   Temp 36.4 °C (97.5 °F) (Temporal)   Resp 20   Ht 1.524 m (5')   Wt 91.2 kg (201 lb)   SpO2 97%   BMI 39.26 kg/m²    Temp  Min: 36.4 °C (97.5 °F)  Max: 37.3 °C (99.1 °F)  Pulse  Min: 60  Max: 75  BP  Min: 90/55  Max: 124/75    General:  AAOx3, No acute distress  Cardiovascular: Warm and well perfused  Respiratory: Normal respiratory effort   Abdominal:  Soft, gravid, non-tender  Extremities: Warm, well perfused      Labs:   Lab Results   Component Value Date    WBC 11.0 2025    HGB 10.8 (L) 2025    HCT 32.7 (L) 2025     (H) 2025        Latest Reference Range & Units 25 19:10 25 19:46 25 01:34 25 03:11 25 06:41   POCT Glucose 74 - 99 mg/dL 89 127 (H) 371 (H) 313 (H) 43 (L)       ASSESSMENT AND PLAN:     23 yo  at 5w5d by first trimester US with PMH uncontrolled pre gestational diabetes with CKD and cHTN presenting from office for glycemic optimization and evaluation of left gluteal cleft abscess.      Uncontrolled pre-gestational diabetes   - Most recent A1C 16.9% in 2024, patient reports no insulin use for over a year.   - Hyperglycemic with , slightly elevated BHB to 0.37 on admission. No evidence of DKA  - Prior regimen (per chart review, pt not taking):  Lantus 32 units, Humalog 12 units before meals and Metformin 1000 mg AM  - Current regimen: Lantus 20/32, Humalog 10u with meals. Received additional 12U corrective sliding scale ovn with hypoglycemia this AM. Consider adding carb  count for snacks today.   - Will f/u repeat POCT after hypoglycemia correction, consider D5LR while NPO   - For fasting, pre prandial and 1 hour post prandial BGs  - Repeat A1C pending.      Left Gluteal Cleft Abscess   - CT pelvis notable for marked subcutaneous soft tissue stranding/skin thickening extending to the perineal region with mildly enlarged left inguinal lymph nodes with c/f an anal fistula tract   - s/p bedside I&D 1/29 with ACS, significant amount of purulent drainage. Continuing to drain. Currently NPO for possible procedure today.   - Cont vanc/zosyn      cHTN  - on chart review, not on meds  - baseline HELLP labs wnl, P:C 0.47     Routine prenatal care   - TVUS on 1/28 notable for GS with MSD of 7 mm w/o fetal pole. For repeat TVUS in 2 weeks   - PNLs ordered wnl     Dispo: pending improved glycemic control      Seen and d/w Dr. Pawan Starr MD  PGY3, Obstetrics and Gynecology     Rosalina Starr MD   [facility]  Pager [phone]     Principal Problem:    Type 2 diabetes mellitus affecting pregnancy in first trimester, antepartum ([facility])  Active Problems:    Hyperglycemia during pregnancy ([facility])

## 2025-01-30 NOTE — PROGRESS NOTES
Xavier Delacruz is a 24 y.o. female on day 1 of admission presenting with Type 2 diabetes mellitus affecting pregnancy in first trimester, antepartum ([facility]).    Social Work Assessment       Patient: Xavier Delacruz   Address: [address] [place] [address]   Phone: [phone] (home)      Referral Reason: housing resources    Prenatal Care: n/a    Other Children: , 1 year old     FOB: Jose    Household Composition: pt, , grandmother, pt's sister and pt's step-dad    IPV/DV or Safety Concerns: Pt denied any safety concerns    Car-Seat:   Safe Sleep Space:   Safe Sleep Education:     Transportation Concerns: denied      Insurance: Medicare    Mental Health Diagnoses: none  Medication(s):   Counseling: Denied need for counseling     Supports: sister    Substance Use History: n/a    Toxicology Screens: n/a    Department of Children and Family Services (DCFS): Denied any history with DCFS      Assessment:     Met with pt bedside. Pt presents as cooperative, pleasant and engaged. Pt states that she is still processing the news of her pregnancy as she just found out about it a few days prior. Pt states that FOB is supportive and that they have been in a relationship for a few years. Pt states that she receives food stamps and cash assistance, denied any other needs for financial resources at this time. Pt did request housing resources as she currently lives with her grandmother.      SW provided emotional support and housing/food resources. SW available as needed, cleared from a SW perspective at this time.     Plan:       Signature:         BRYLEE M. JONES, LSW

## 2025-01-30 NOTE — PROGRESS NOTES
[facility]  ACUTE CARE SURGERY - PROGRESS NOTE    Patient Name: Xavier Delacruz  MRN: [id number]  Admit Date: 128  : 2000  AGE: 24 y.o.   GENDER: female  ==============================================================================  TODAY'S ASSESSMENT AND PLAN OF CARE:  Xavier Delacruz is a 24 y.o. female with history of T1DM, CKD, HTN  who presents to  ED for hyperglycemia, concern for DKA from clinic. ACS is consulted for gluteal abscess. Patient had bedside I&D on  with copious drainage of purulence; although somewhat limited evaluation 2/2 patient pain/anxiety.     Recs:   -Continue Abx; daily labs   -Will re-evaluate abscess daily to determine if she would require escalation of drainage to OR for further wound evaluation and ensuring breakdown of any loculations. Tentative plan for OR  for better exam under anesthesia and drainage.   -Please have patient NPO after MN   -ACS will continue follow       Elisa Noriega MD  PGY-2 Gen Surg  ACS [id number]      ==============================================================================  CHIEF COMPLAINT / EVENTS LAST 24HRS / HPI:  NAEO; states pain better today after drainage yesterday. Denies any N/V/CP/fevers/chills/SOB      PHYSICAL EXAM:  Heart Rate:  [60-71]   Temp:  [36.4 °C (97.5 °F)-36.8 °C (98.2 °F)]   Resp:  [16-20]   BP: ()/(55-77)   SpO2:  [97 %-100 %]   Constitutional: NAD, A&Ox3   Head/Neck: NCAT  Eyes: Anicteric   Cardiovascular: Regular rate and rhythm per peripheral palpation   Respiratory: Breathing comfortably on RA with symmetric chest rise   Abdominal: Soft, non tender, non-distended without rebound or guarding   : left gluteal abscess cavity with active drainage of purulent fluid    Ext: MAEx4  Psych: Appropriate mood and affect       IMAGING SUMMARY:  (summary of new imaging findings, not a copy of dictation)  All pertinent imaging reviewed.     LABS:  Results from last  7 days   Lab Units 01/30/25  0642 01/28/25  1615   WBC AUTO x10*3/uL 10.2 11.0   HEMOGLOBIN g/dL 9.8* 10.8*   HEMATOCRIT % 32.4* 32.7*   PLATELETS AUTO x10*3/uL 522* 477*   NEUTROS PCT AUTO % 51.0 72.5   LYMPHS PCT AUTO % 37.9 18.8   MONOS PCT AUTO % 8.0 7.3   EOS PCT AUTO % 2.5 0.5         Results from last 7 days   Lab Units 01/30/25  0643 01/29/25  0506 01/28/25  1615   SODIUM mmol/L 142 137 134*   POTASSIUM mmol/L 3.0* 3.3* 3.5   CHLORIDE mmol/L 109* 103 99   CO2 mmol/L 24 24 25   BUN mg/dL 6 7 8   CREATININE mg/dL 0.53 0.47* 0.60   CALCIUM mg/dL 8.9 9.3 9.5   PROTEIN TOTAL g/dL  --  7.0 7.9   BILIRUBIN TOTAL mg/dL  --  0.3 0.4   ALK PHOS U/L  --  105 110   ALT U/L  --  7 8   AST U/L  --  13 14   GLUCOSE mg/dL 42* 227* 330*     Results from last 7 days   Lab Units 01/29/25  0506 01/28/25  1615   BILIRUBIN TOTAL mg/dL 0.3 0.4           I have reviewed all medications, laboratory results, and imaging pertinent for today's encounter.

## 2025-01-31 ENCOUNTER — ANESTHESIA (OUTPATIENT)
Dept: OPERATING ROOM | Facility: HOSPITAL | Age: 25
End: 2025-01-31
Payer: MEDICARE

## 2025-01-31 ENCOUNTER — ANESTHESIA EVENT (OUTPATIENT)
Dept: OPERATING ROOM | Facility: HOSPITAL | Age: 25
End: 2025-01-31
Payer: MEDICARE

## 2025-01-31 LAB
ANION GAP SERPL CALC-SCNC: 13 MMOL/L (ref 10–20)
B-HCG SERPL-ACNC: 9337 MIU/ML
BUN SERPL-MCNC: 4 MG/DL (ref 6–23)
CALCIUM SERPL-MCNC: 8.5 MG/DL (ref 8.6–10.6)
CHLORIDE SERPL-SCNC: 105 MMOL/L (ref 98–107)
CO2 SERPL-SCNC: 22 MMOL/L (ref 21–32)
CREAT SERPL-MCNC: 0.46 MG/DL (ref 0.5–1.05)
EGFRCR SERPLBLD CKD-EPI 2021: >90 ML/MIN/1.73M*2
ERYTHROCYTE [DISTWIDTH] IN BLOOD BY AUTOMATED COUNT: 13 % (ref 11.5–14.5)
GLUCOSE BLD MANUAL STRIP-MCNC: 104 MG/DL (ref 74–99)
GLUCOSE BLD MANUAL STRIP-MCNC: 111 MG/DL (ref 74–99)
GLUCOSE BLD MANUAL STRIP-MCNC: 168 MG/DL (ref 74–99)
GLUCOSE BLD MANUAL STRIP-MCNC: 39 MG/DL (ref 74–99)
GLUCOSE BLD MANUAL STRIP-MCNC: 40 MG/DL (ref 74–99)
GLUCOSE BLD MANUAL STRIP-MCNC: 46 MG/DL (ref 74–99)
GLUCOSE BLD MANUAL STRIP-MCNC: 47 MG/DL (ref 74–99)
GLUCOSE BLD MANUAL STRIP-MCNC: 52 MG/DL (ref 74–99)
GLUCOSE BLD MANUAL STRIP-MCNC: 65 MG/DL (ref 74–99)
GLUCOSE BLD MANUAL STRIP-MCNC: 65 MG/DL (ref 74–99)
GLUCOSE BLD MANUAL STRIP-MCNC: 67 MG/DL (ref 74–99)
GLUCOSE BLD MANUAL STRIP-MCNC: 85 MG/DL (ref 74–99)
GLUCOSE BLD MANUAL STRIP-MCNC: 86 MG/DL (ref 74–99)
GLUCOSE BLD MANUAL STRIP-MCNC: 88 MG/DL (ref 74–99)
GLUCOSE SERPL-MCNC: 65 MG/DL (ref 74–99)
HCT VFR BLD AUTO: 32.3 % (ref 36–46)
HGB BLD-MCNC: 9.9 G/DL (ref 12–16)
MAGNESIUM SERPL-MCNC: 2.37 MG/DL (ref 1.6–2.4)
MCH RBC QN AUTO: 23.1 PG (ref 26–34)
MCHC RBC AUTO-ENTMCNC: 30.7 G/DL (ref 32–36)
MCV RBC AUTO: 75 FL (ref 80–100)
NRBC BLD-RTO: 0 /100 WBCS (ref 0–0)
PLATELET # BLD AUTO: 512 X10*3/UL (ref 150–450)
POTASSIUM SERPL-SCNC: 4.3 MMOL/L (ref 3.5–5.3)
RBC # BLD AUTO: 4.29 X10*6/UL (ref 4–5.2)
SODIUM SERPL-SCNC: 136 MMOL/L (ref 136–145)
VANCOMYCIN SERPL-MCNC: 12 UG/ML (ref 5–20)
WBC # BLD AUTO: 7.8 X10*3/UL (ref 4.4–11.3)

## 2025-01-31 PROCEDURE — 2500000001 HC RX 250 WO HCPCS SELF ADMINISTERED DRUGS (ALT 637 FOR MEDICARE OP)

## 2025-01-31 PROCEDURE — 84702 CHORIONIC GONADOTROPIN TEST: CPT

## 2025-01-31 PROCEDURE — 2500000002 HC RX 250 W HCPCS SELF ADMINISTERED DRUGS (ALT 637 FOR MEDICARE OP, ALT 636 FOR OP/ED)

## 2025-01-31 PROCEDURE — 2500000004 HC RX 250 GENERAL PHARMACY W/ HCPCS (ALT 636 FOR OP/ED)

## 2025-01-31 PROCEDURE — 83735 ASSAY OF MAGNESIUM: CPT

## 2025-01-31 PROCEDURE — 36415 COLL VENOUS BLD VENIPUNCTURE: CPT

## 2025-01-31 PROCEDURE — 99024 POSTOP FOLLOW-UP VISIT: CPT | Performed by: SURGERY

## 2025-01-31 PROCEDURE — 1210000001 HC SEMI-PRIVATE ROOM DAILY

## 2025-01-31 PROCEDURE — 2500000005 HC RX 250 GENERAL PHARMACY W/O HCPCS: Performed by: STUDENT IN AN ORGANIZED HEALTH CARE EDUCATION/TRAINING PROGRAM

## 2025-01-31 PROCEDURE — 2500000005 HC RX 250 GENERAL PHARMACY W/O HCPCS

## 2025-01-31 PROCEDURE — 99233 SBSQ HOSP IP/OBS HIGH 50: CPT

## 2025-01-31 PROCEDURE — 82947 ASSAY GLUCOSE BLOOD QUANT: CPT

## 2025-01-31 PROCEDURE — 85027 COMPLETE CBC AUTOMATED: CPT

## 2025-01-31 PROCEDURE — 80048 BASIC METABOLIC PNL TOTAL CA: CPT

## 2025-01-31 PROCEDURE — 80202 ASSAY OF VANCOMYCIN: CPT | Performed by: STUDENT IN AN ORGANIZED HEALTH CARE EDUCATION/TRAINING PROGRAM

## 2025-01-31 RX ORDER — INSULIN GLARGINE 100 [IU]/ML
10 INJECTION, SOLUTION SUBCUTANEOUS EVERY 24 HOURS
Status: DISCONTINUED | OUTPATIENT
Start: 2025-01-31 | End: 2025-01-31

## 2025-01-31 RX ORDER — DEXTROSE 50 % IN WATER (D50W) INTRAVENOUS SYRINGE
12.5
Status: DISCONTINUED | OUTPATIENT
Start: 2025-01-31 | End: 2025-02-01

## 2025-01-31 RX ORDER — INSULIN LISPRO 100 [IU]/ML
0-10 INJECTION, SOLUTION INTRAVENOUS; SUBCUTANEOUS EVERY 4 HOURS
Status: DISCONTINUED | OUTPATIENT
Start: 2025-02-01 | End: 2025-02-01

## 2025-01-31 RX ORDER — DEXTROSE 50 % IN WATER (D50W) INTRAVENOUS SYRINGE
25
Status: DISCONTINUED | OUTPATIENT
Start: 2025-01-31 | End: 2025-02-01

## 2025-01-31 RX ORDER — INSULIN GLARGINE 100 [IU]/ML
10 INJECTION, SOLUTION SUBCUTANEOUS EVERY 24 HOURS
Status: COMPLETED | OUTPATIENT
Start: 2025-01-31 | End: 2025-01-31

## 2025-01-31 RX ORDER — DEXTROSE, SODIUM CHLORIDE, SODIUM LACTATE, POTASSIUM CHLORIDE, AND CALCIUM CHLORIDE 5; .6; .31; .03; .02 G/100ML; G/100ML; G/100ML; G/100ML; G/100ML
50 INJECTION, SOLUTION INTRAVENOUS CONTINUOUS
Status: DISCONTINUED | OUTPATIENT
Start: 2025-01-31 | End: 2025-02-01

## 2025-01-31 RX ADMIN — PIPERACILLIN SODIUM AND TAZOBACTAM SODIUM 3.38 G: 3; .375 INJECTION, SOLUTION INTRAVENOUS at 17:21

## 2025-01-31 RX ADMIN — METFORMIN HYDROCHLORIDE 500 MG: 500 TABLET, EXTENDED RELEASE ORAL at 19:25

## 2025-01-31 RX ADMIN — PRENATAL VIT W/ FE FUMARATE-FA TAB 27-0.8 MG 1 TABLET: 27-0.8 TAB at 08:29

## 2025-01-31 RX ADMIN — SODIUM CHLORIDE 125 ML/HR: 9 INJECTION, SOLUTION INTRAVENOUS at 17:59

## 2025-01-31 RX ADMIN — VANCOMYCIN HYDROCHLORIDE 1500 MG: 5 INJECTION, POWDER, LYOPHILIZED, FOR SOLUTION INTRAVENOUS at 18:02

## 2025-01-31 RX ADMIN — SODIUM CHLORIDE, SODIUM LACTATE, POTASSIUM CHLORIDE, CALCIUM CHLORIDE AND DEXTROSE MONOHYDRATE 50 ML/HR: 5; 600; 310; 30; 20 INJECTION, SOLUTION INTRAVENOUS at 13:39

## 2025-01-31 RX ADMIN — INSULIN LISPRO 12 UNITS: 100 INJECTION, SOLUTION INTRAVENOUS; SUBCUTANEOUS at 18:43

## 2025-01-31 RX ADMIN — VANCOMYCIN HYDROCHLORIDE 1500 MG: 5 INJECTION, POWDER, LYOPHILIZED, FOR SOLUTION INTRAVENOUS at 06:51

## 2025-01-31 RX ADMIN — SODIUM CHLORIDE 125 ML/HR: 9 INJECTION, SOLUTION INTRAVENOUS at 00:01

## 2025-01-31 RX ADMIN — DEXTROSE MONOHYDRATE 25 G: 25 INJECTION, SOLUTION INTRAVENOUS at 15:06

## 2025-01-31 RX ADMIN — PIPERACILLIN SODIUM AND TAZOBACTAM SODIUM 3.38 G: 3; .375 INJECTION, SOLUTION INTRAVENOUS at 09:09

## 2025-01-31 RX ADMIN — PIPERACILLIN SODIUM AND TAZOBACTAM SODIUM 3.38 G: 3; .375 INJECTION, SOLUTION INTRAVENOUS at 00:01

## 2025-01-31 RX ADMIN — SODIUM CHLORIDE, SODIUM LACTATE, POTASSIUM CHLORIDE, CALCIUM CHLORIDE AND DEXTROSE MONOHYDRATE 50 ML/HR: 5; 600; 310; 30; 20 INJECTION, SOLUTION INTRAVENOUS at 21:03

## 2025-01-31 RX ADMIN — INSULIN GLARGINE 10 UNITS: 100 INJECTION, SOLUTION SUBCUTANEOUS at 21:02

## 2025-01-31 RX ADMIN — METFORMIN HYDROCHLORIDE 500 MG: 500 TABLET, EXTENDED RELEASE ORAL at 08:29

## 2025-01-31 RX ADMIN — INSULIN GLARGINE 32 UNITS: 100 INJECTION, SOLUTION SUBCUTANEOUS at 00:47

## 2025-01-31 RX ADMIN — INSULIN LISPRO 12 UNITS: 100 INJECTION, SOLUTION INTRAVENOUS; SUBCUTANEOUS at 08:31

## 2025-01-31 RX ADMIN — SODIUM CHLORIDE 125 ML/HR: 9 INJECTION, SOLUTION INTRAVENOUS at 18:02

## 2025-01-31 RX ADMIN — MICONAZOLE NITRATE 1 APPLICATION: 20 CREAM VAGINAL at 21:05

## 2025-01-31 RX ADMIN — DEXTROSE MONOHYDRATE 12.5 G: 25 INJECTION, SOLUTION INTRAVENOUS at 11:36

## 2025-01-31 SDOH — SOCIAL STABILITY: SOCIAL INSECURITY: DO YOU FEEL UNSAFE GOING BACK TO THE PLACE WHERE YOU ARE LIVING?: NO

## 2025-01-31 SDOH — ECONOMIC STABILITY: FOOD INSECURITY: HOW HARD IS IT FOR YOU TO PAY FOR THE VERY BASICS LIKE FOOD, HOUSING, MEDICAL CARE, AND HEATING?: NOT HARD AT ALL

## 2025-01-31 SDOH — SOCIAL STABILITY: SOCIAL INSECURITY: WITHIN THE LAST YEAR, HAVE YOU BEEN AFRAID OF YOUR PARTNER OR EX-PARTNER?: NO

## 2025-01-31 SDOH — SOCIAL STABILITY: SOCIAL INSECURITY: ARE YOU OR HAVE YOU BEEN THREATENED OR ABUSED PHYSICALLY, EMOTIONALLY, OR SEXUALLY BY ANYONE?: NO

## 2025-01-31 SDOH — SOCIAL STABILITY: SOCIAL INSECURITY: WITHIN THE LAST YEAR, HAVE YOU BEEN HUMILIATED OR EMOTIONALLY ABUSED IN OTHER WAYS BY YOUR PARTNER OR EX-PARTNER?: NO

## 2025-01-31 SDOH — SOCIAL STABILITY: SOCIAL INSECURITY
WITHIN THE LAST YEAR, HAVE YOU BEEN RAPED OR FORCED TO HAVE ANY KIND OF SEXUAL ACTIVITY BY YOUR PARTNER OR EX-PARTNER?: NO

## 2025-01-31 SDOH — SOCIAL STABILITY: SOCIAL INSECURITY: WERE YOU ABLE TO COMPLETE ALL THE BEHAVIORAL HEALTH SCREENINGS?: YES

## 2025-01-31 SDOH — SOCIAL STABILITY: SOCIAL INSECURITY: DO YOU FEEL ANYONE HAS EXPLOITED OR TAKEN ADVANTAGE OF YOU FINANCIALLY OR OF YOUR PERSONAL PROPERTY?: NO

## 2025-01-31 SDOH — SOCIAL STABILITY: SOCIAL INSECURITY: ARE THERE ANY APPARENT SIGNS OF INJURIES/BEHAVIORS THAT COULD BE RELATED TO ABUSE/NEGLECT?: NO

## 2025-01-31 SDOH — SOCIAL STABILITY: SOCIAL INSECURITY: DOES ANYONE TRY TO KEEP YOU FROM HAVING/CONTACTING OTHER FRIENDS OR DOING THINGS OUTSIDE YOUR HOME?: NO

## 2025-01-31 SDOH — ECONOMIC STABILITY: FOOD INSECURITY: WITHIN THE PAST 12 MONTHS, THE FOOD YOU BOUGHT JUST DIDN'T LAST AND YOU DIDN'T HAVE MONEY TO GET MORE.: NEVER TRUE

## 2025-01-31 SDOH — ECONOMIC STABILITY: FOOD INSECURITY: WITHIN THE PAST 12 MONTHS, YOU WORRIED THAT YOUR FOOD WOULD RUN OUT BEFORE YOU GOT THE MONEY TO BUY MORE.: NEVER TRUE

## 2025-01-31 SDOH — SOCIAL STABILITY: SOCIAL INSECURITY
WITHIN THE LAST YEAR, HAVE YOU BEEN KICKED, HIT, SLAPPED, OR OTHERWISE PHYSICALLY HURT BY YOUR PARTNER OR EX-PARTNER?: NO

## 2025-01-31 SDOH — SOCIAL STABILITY: SOCIAL INSECURITY: ABUSE: ADULT

## 2025-01-31 SDOH — ECONOMIC STABILITY: TRANSPORTATION INSECURITY: IN THE PAST 12 MONTHS, HAS LACK OF TRANSPORTATION KEPT YOU FROM MEDICAL APPOINTMENTS OR FROM GETTING MEDICATIONS?: NO

## 2025-01-31 SDOH — SOCIAL STABILITY: SOCIAL INSECURITY: HAS ANYONE EVER THREATENED TO HURT YOUR FAMILY OR YOUR PETS?: NO

## 2025-01-31 SDOH — SOCIAL STABILITY: SOCIAL INSECURITY: HAVE YOU HAD THOUGHTS OF HARMING ANYONE ELSE?: NO

## 2025-01-31 ASSESSMENT — COGNITIVE AND FUNCTIONAL STATUS - GENERAL
PATIENT BASELINE BEDBOUND: NO
DAILY ACTIVITIY SCORE: 24
MOBILITY SCORE: 24

## 2025-01-31 ASSESSMENT — ACTIVITIES OF DAILY LIVING (ADL)
PATIENT'S MEMORY ADEQUATE TO SAFELY COMPLETE DAILY ACTIVITIES?: YES
FEEDING YOURSELF: INDEPENDENT
TOILETING: INDEPENDENT
GROOMING: INDEPENDENT
WALKS IN HOME: INDEPENDENT
ADEQUATE_TO_COMPLETE_ADL: YES
LACK_OF_TRANSPORTATION: NO
BATHING: INDEPENDENT
LACK_OF_TRANSPORTATION: NO
HEARING - RIGHT EAR: FUNCTIONAL
HEARING - LEFT EAR: FUNCTIONAL
JUDGMENT_ADEQUATE_SAFELY_COMPLETE_DAILY_ACTIVITIES: YES
DRESSING YOURSELF: INDEPENDENT

## 2025-01-31 ASSESSMENT — PATIENT HEALTH QUESTIONNAIRE - PHQ9
1. LITTLE INTEREST OR PLEASURE IN DOING THINGS: NOT AT ALL
SUM OF ALL RESPONSES TO PHQ9 QUESTIONS 1 & 2: 0
2. FEELING DOWN, DEPRESSED OR HOPELESS: NOT AT ALL

## 2025-01-31 ASSESSMENT — LIFESTYLE VARIABLES
HOW MANY STANDARD DRINKS CONTAINING ALCOHOL DO YOU HAVE ON A TYPICAL DAY: PATIENT DOES NOT DRINK
AUDIT-C TOTAL SCORE: 0
SKIP TO QUESTIONS 9-10: 1
HOW OFTEN DO YOU HAVE A DRINK CONTAINING ALCOHOL: NEVER
AUDIT-C TOTAL SCORE: 0
HOW OFTEN DO YOU HAVE 6 OR MORE DRINKS ON ONE OCCASION: NEVER

## 2025-01-31 ASSESSMENT — PAIN SCALES - GENERAL
PAINLEVEL_OUTOF10: 0 - NO PAIN

## 2025-01-31 ASSESSMENT — PAIN - FUNCTIONAL ASSESSMENT: PAIN_FUNCTIONAL_ASSESSMENT: 0-10

## 2025-01-31 NOTE — CARE PLAN
The patient's goals for the shift include rest.    The clinical goals for the shift include no increased signs of infection.     Pt denies pain, VSS. Assessments WNL except for diarrhea, managed with medication. IV ATBs continued, Insulin given as ordered.     Problem: Hypertensive Disorder of Pregnancy (HDP)  Goal: Minimal s/sx of HDP and BP<160/110  Outcome: Progressing     Problem: Infection  Goal: Wound will have less exudate and warmth  Outcome: Progressing  Goal: Improvement in s/sx of infection  Outcome: Progressing     Problem: Diabetes  Goal: Achieve decreasing blood glucose levels by end of shift  Outcome: Progressing  Goal: Increase stability of blood glucose readings by end of shift  Outcome: Progressing  Goal: Decrease in ketones present in urine by end of shift  Outcome: Progressing  Goal: Maintain electrolyte levels within acceptable range throughout shift  Outcome: Progressing  Goal: Maintain glucose levels >70mg/dl to <250mg/dl throughout shift  Outcome: Progressing  Goal: No changes in neurological exam by end of shift  Outcome: Progressing  Goal: Learn about and adhere to nutrition recommendations by end of shift  Outcome: Progressing  Goal: Vital signs within normal range for age by end of shift  Outcome: Progressing  Goal: Increase self care and/or family involovement by end of shift  Outcome: Progressing  Goal: Receive DSME education by end of shift  Outcome: Progressing

## 2025-01-31 NOTE — PROGRESS NOTES
[facility]  ACUTE CARE SURGERY - PROGRESS NOTE    Patient Name: Xavier Delacruz  MRN: [id number]  Admit Date: 128  : 2000  AGE: 24 y.o.   GENDER: female  ==============================================================================  TODAY'S ASSESSMENT AND PLAN OF CARE:  Xavier Delacruz is a 24 y.o. female with history of T1DM, CKD, HTN  who presents to  ED for hyperglycemia, concern for DKA from clinic. ACS is consulted for gluteal abscess. Patient had bedside I&D on  with copious drainage of purulence; although somewhat limited evaluation 2/2 patient pain/anxiety.     Recs:   -Continue Abx; daily labs   -Tentative plan for OR 2/1 better exam under anesthesia and drainage.   -Please have patient NPO after MN   -ACS will continue follow       Elisa Noriega MD  PGY-2 Gen Surg  ACS [id number]    ==============================================================================  CHIEF COMPLAINT / EVENTS LAST 24HRS / HPI:  NAEO; states pain better today after drainage yesterday. Denies any N/V/CP/fevers/chills/SOB    PHYSICAL EXAM:  Heart Rate:  [59-88]   Temp:  [36.2 °C (97.2 °F)-37.3 °C (99.1 °F)]   Resp:  [16-20]   BP: (112-120)/(73-76)   SpO2:  [96 %-99 %]   Constitutional: NAD, A&Ox3   Head/Neck: NCAT  Eyes: Anicteric   Cardiovascular: Regular rate and rhythm per peripheral palpation   Respiratory: Breathing comfortably on RA with symmetric chest rise   Abdominal: Soft, non tender, non-distended without rebound or guarding   : left gluteal abscess cavity with active drainage of purulent fluid    Ext: MAEx4  Psych: Appropriate mood and affect       IMAGING SUMMARY:  (summary of new imaging findings, not a copy of dictation)  All pertinent imaging reviewed.     LABS:  Results from last 7 days   Lab Units 25  1405 25  0642 25  1615   WBC AUTO x10*3/uL 7.8 10.2 11.0   HEMOGLOBIN g/dL 9.9* 9.8* 10.8*   HEMATOCRIT % 32.3* 32.4* 32.7*    PLATELETS AUTO x10*3/uL 512* 522* 477*   NEUTROS PCT AUTO %  --  51.0 72.5   LYMPHS PCT AUTO %  --  37.9 18.8   MONOS PCT AUTO %  --  8.0 7.3   EOS PCT AUTO %  --  2.5 0.5         Results from last 7 days   Lab Units 01/31/25  0704 01/30/25  0643 01/29/25  0506 01/28/25  1615   SODIUM mmol/L 136 142 137 134*   POTASSIUM mmol/L 4.3 3.0* 3.3* 3.5   CHLORIDE mmol/L 105 109* 103 99   CO2 mmol/L 22 24 24 25   BUN mg/dL 4* 6 7 8   CREATININE mg/dL 0.46* 0.53 0.47* 0.60   CALCIUM mg/dL 8.5* 8.9 9.3 9.5   PROTEIN TOTAL g/dL  --   --  7.0 7.9   BILIRUBIN TOTAL mg/dL  --   --  0.3 0.4   ALK PHOS U/L  --   --  105 110   ALT U/L  --   --  7 8   AST U/L  --   --  13 14   GLUCOSE mg/dL 65* 42* 227* 330*     Results from last 7 days   Lab Units 01/29/25  0506 01/28/25  1615   BILIRUBIN TOTAL mg/dL 0.3 0.4           I have reviewed all medications, laboratory results, and imaging pertinent for today's encounter.

## 2025-01-31 NOTE — PROGRESS NOTES
Vancomycin Dosing by Pharmacy- FOLLOW UP    Xavier Delacruz is a 24 y.o. year old female who Pharmacy has been consulted for vancomycin dosing for cellulitis, skin and soft tissue. Based on the patient's indication and renal status this patient is being dosed based on a goal AUC of 400-600.     Renal function is currently stable.    Current vancomycin dose: 1500 mg given every 12 hours    Estimated vancomycin AUC on current dose: 494 mg/L.hr     Visit Vitals  /73   Pulse 81   Temp 36.6 °C (97.9 °F) (Temporal)   Resp 16        Lab Results   Component Value Date    CREATININE 0.46 (L) 2025    CREATININE 0.53 2025    CREATININE 0.47 (L) 2025    CREATININE 0.60 2025        Patient weight is as follows:   Vitals:    25 1545   Weight: 91.2 kg (201 lb)       Cultures:  No results found for the encounter in last 14 days.       No intake/output data recorded.  I/O during current shift:  No intake/output data recorded.    Temp (24hrs), Av.7 °C (98.1 °F), Min:36.2 °C (97.2 °F), Max:37.3 °C (99.1 °F)      Assessment/Plan    Within goal AUC range. Continue current vancomycin regimen.    This dosing regimen is predicted by [employer] to result in the following pharmacokinetic parameters:  Loading dose: N/A  Regimen: 1500 mg IV every 12 hours.  Start time: 18:51 on 2025  Exposure target: AUC24 (range)400-600 mg/L.hr   [id number]: 482 mg/L.hr  AUC24,ss: 494 mg/L.hr  Probability of AUC24 > 400: 92 %  Ctrough,ss: 14.3 mg/L  Probability of Ctrough,ss > 20: 11 %      The next level will be obtained on 2/3 at 0500. May be obtained sooner if clinically indicated.   Will continue to monitor renal function daily while on vancomycin and order serum creatinine at least every 48 hours if not already ordered.  Follow for continued vancomycin needs, clinical response, and signs/symptoms of toxicity.       LINWOOD SANTIAGO

## 2025-01-31 NOTE — PROGRESS NOTES
Xavier Delacruz is a 24 y.o. female on day 2 of admission presenting with Type 2 diabetes mellitus affecting pregnancy in first trimester, antepartum ([facility]).    SWer met with Ms. Delacruz to clarify wishes surrounding pregnancy. Per RN and the report they received, Ms. Delacruz was possibly wishing to terminate this pregnancy. Upon SWer entering the room, Ms. Delacruz was pleasant and watching TV on her phone. Introduced self and role in helping to clarify her wishes and ensure we are doing what she wants. Ms. Delacruz stated that she DOES wish to continue with this pregnancy and denied having other needs. Ms. Delacruz met with another [facility] SWer yesterday (1/30/25) and was provided resources.    NO further needs at this time. Will continue to be available.    Reviewed and approved by MOLLY DIXON on 1/31/25 at 11:02 AM.

## 2025-01-31 NOTE — INTERVAL H&P NOTE
H&P reviewed as well as inpatient progress notes. Patient in need for further exam under anesthesia for incision and drainage and possible wound debridement given continued signs of infection on exam.

## 2025-01-31 NOTE — NURSING NOTE
Team Huddle performed with patient's RN, MFM and Gen Surgery.  Plan for patient to be NPO at midnight tonight 1/31 for surgery first thing in the morning 2/1.  Once NPO, glucose checks should be performed every 4 hours (0400/0800/etc).  Per MFM, discontinue Dextrose/LR currently infusing at 50mL/hr.  There will no longer be a pre pran sliding scale.  Patient may order her PM meal now.  Lispro 12 units is to be given immediately prior to the PM meal and a 1 hour post prandial check performed.  MFM will communicate with nursing team when they decide what the patient's PM Lantus dose should be tonight.  No new orders entered/adjusted yet, as the MFM team is at sign out.

## 2025-01-31 NOTE — PROGRESS NOTES
ANTEPARTUM PROGRESS NOTE   2025, 12:50 PM     SUBJECTIVE:   Patient feeling OK this morning; seen while getting blood drawn. Has been NPO since MN. Looking forward to surgery today. Denies pain.     OBJECTIVE:   /73   Pulse 81   Temp 36.6 °C (97.9 °F) (Temporal)   Resp 16   Ht 1.524 m (5')   Wt 91.2 kg (201 lb)   SpO2 99%   BMI 39.26 kg/m²    Temp  Min: 36.2 °C (97.2 °F)  Max: 37.3 °C (99.1 °F)  Pulse  Min: 60  Max: 88  BP  Min: 112/73  Max: 121/75    General:  AAOx3, No acute distress  Cardiovascular: Warm and well perfused  Respiratory: Normal respiratory effort   Abdominal:  Soft, gravid, non-tender  Extremities: Warm, well perfused      Labs:   Lab Results   Component Value Date    WBC 10.2 2025    HGB 9.8 (L) 2025    HCT 32.4 (L) 2025     (H) 2025        Latest Reference Range & Units 25 19:10 25 19:46 25 01:34 25 03:11 25 06:41   POCT Glucose 74 - 99 mg/dL 89 127 (H) 371 (H) 313 (H) 43 (L)       US Pelvis ()  IMPRESSION:  *Single intrauterine gestational sac of unknown viability,  corresponding to a gestational age of 5 weeks 3 days. No fetal pole  or heart rate identified which can be secondary to early pregnancy.  Follow-up ultrasound in 14 days is recommended to assess viability.      ASSESSMENT AND PLAN:     23 yo  at 5w6d by first trimester US with PMH uncontrolled pre gestational diabetes with CKD and cHTN presenting from office for glycemic optimization and evaluation of left gluteal cleft abscess.      Uncontrolled pre-gestational diabetes   - Most recent A1C 16.9% in 2024, patient reports no insulin use for over a year.   - Hyperglycemic with , slightly elevated BHB to 0.37 on admission. No evidence of DKA  - Prior regimen (per chart review, pt not taking):  Lantus 32 units, Humalog 12 units before meals and Metformin 1000 mg AM  - Current regimen (while eating): Lantus 20/32 (1200, 0000), Humalog  12u with meals with pre-prandial SSI. For fasting, pre prandial and 1 hour post prandial Bgs while eating. Note that timing of long-acting insulin is off due to patient getting first mid-morning in ED. Will work towards timing of long acting   - Currently NPO in s/o gluteal abscess procedure with Gen Surg today; for q4 POC BG while NPO> continue this AM.  - Repeat A1C pending.      Left Gluteal Cleft Abscess   - CT pelvis notable for marked subcutaneous soft tissue stranding/skin thickening extending to the perineal region with mildly enlarged left inguinal lymph nodes with c/f an anal fistula tract   - s/p bedside I&D 1/29 with ACS, significant amount of purulent drainage. Continuing to drain. Currently NPO for possible procedure today.   - Urine cx collected w/ contamination. Plan to repeat if clinically indicated.   - Cont vanc/zosyn pending culture results     cHTN  - on chart review, not on meds  - baseline HELLP labs wnl, P:C 0.47     Routine prenatal care   - TVUS on 1/28 notable for GS with MSD of 7 mm w/o fetal pole. For repeat TVUS in 2 weeks   - PNLs ordered wnl     Dispo: pending improved glycemic control, improvement in gluteal abscess     To be seen and d/w ISMAEL Lucas. Rhea Collins MD  PGY-II, Obstetrics & Gynecology   [facility]     Principal Problem:    Type 2 diabetes mellitus affecting pregnancy in first trimester, antepartum ([facility])  Active Problems:    Hyperglycemia during pregnancy ([facility])    Abscess

## 2025-01-31 NOTE — SIGNIFICANT EVENT
Plan of Care     Xavier Delacruz is a 24 y.o.  at 5w6d admitted to the antepartum service for medication titration in the setting of uncontrolled T2DM and surgical management of a gluteal abscess.      Patient started on long-acting and mealtime insulin given elevated blood glucose (300s on admission) and received 20u of Lantus @ 1600 () and 32u of Lantus @ 0145 (). Please note that the timings were based on the initial insulin administration times while patient was in the ED. Between the hours of 0955 () and 0355 (), she received an additional 23u of Lispro (meal time coverage in addition to corrective scale dosing) . By  0641 (), patient was hypoglycemic to 43, which was treated with juice and Cassandra Doone cookies which incremented her BGL to 126, however, given patient was no longer NPO, her surgery was then cancelled. Patient subsequently permitted to eat, then was made NPO at MN again that night. The next morning, the patient was continued on and appropriately received Lantus 32u at 0047 (), however, at 0831, patient was additionally given the meal-time Lispro 12u in the absence of a meal, resulting in the patient again going hypoglycemic to the 40s. She was again treated with juice and cookies, but this time without significant improvement so she was then administered dextrose, which increased her BGL to168. Patient felt symptomatically improved, however, was again ineligible for surgery given loss of NPO status. She was due for Lantus 20u mid-day today, though it was never administered given proximity to timing of acute hypoglycemia.       A/P  - Patient now intermittently NPO over multiple hours over the last couple of days; I&D rescheduled now 2x   - Chain of events & detailed chart review conducted. S/p huddle with RN and MD care teams regarding areas of improvement, patient care, staff education, and effective communication.   - Patient currently boarding on postpartum  unit given lack of available beds on antepartum unit at time of admission. Given medical complexity and more familiarity of nursing staff with antepartum insulin regimens, recommend transfer to [facility] 4 at this time.  - Per general surgery, plan is for OR tomorrow morning; currently scheduled as first case   - Overnight plan as follows:    - Patient is allowed to eat until MN, at which point she will again be NPO       - Patient is to receive 12u of Lispro with dinner. Please check a pre and 1h post prandial POC BG   - Please administer Lantus 10u at 9pm (ordered as a 1 time dose)    - Patient to be NPO at MN. At this time, we will transition to q4h POC BG with an insulin corrective  sliding scale    - Continue mIVF with D5LR    - If hypoglycemic, please follow the hypoglycemia order set. Please avoid PO intake (although clear liquids OK) unless cleared by a physician   - Plans were discussed with current and incoming teams;  to reach out if questions or concerns arise.   - Plan to closely monitor BGL; for further medication titration tomorrow following I&D. Lability of blood glucose likely secondary to recent NPO status and possibly being insulin naive (hasn't taken it in >1y). Please review and adjust orders accordingly.   - Appreciate excellent ongoing care by all care teams.     Dispo: Transfer to [facility]    D/w NOAM Lucas MD  PGY-II, Obstetrics & Gynecology   [facility]

## 2025-02-01 LAB
ABO GROUP (TYPE) IN BLOOD: NORMAL
ANTIBODY SCREEN: NORMAL
BV SCORE VAG QL: ABNORMAL
GLUCOSE BLD MANUAL STRIP-MCNC: 100 MG/DL (ref 74–99)
GLUCOSE BLD MANUAL STRIP-MCNC: 108 MG/DL (ref 74–99)
GLUCOSE BLD MANUAL STRIP-MCNC: 116 MG/DL (ref 74–99)
GLUCOSE BLD MANUAL STRIP-MCNC: 204 MG/DL (ref 74–99)
GLUCOSE BLD MANUAL STRIP-MCNC: 276 MG/DL (ref 74–99)
GLUCOSE BLD MANUAL STRIP-MCNC: 339 MG/DL (ref 74–99)
GLUCOSE BLD MANUAL STRIP-MCNC: 56 MG/DL (ref 74–99)
GLUCOSE BLD MANUAL STRIP-MCNC: 57 MG/DL (ref 74–99)
GLUCOSE BLD MANUAL STRIP-MCNC: 69 MG/DL (ref 74–99)
GLUCOSE BLD MANUAL STRIP-MCNC: 79 MG/DL (ref 74–99)
GLUCOSE BLD MANUAL STRIP-MCNC: 97 MG/DL (ref 74–99)
GLUCOSE BLD MANUAL STRIP-MCNC: 98 MG/DL (ref 74–99)
HEMOGLOBIN A2: 2.5 % (ref 2–3.5)
HEMOGLOBIN A: 96.3 % (ref 95.8–98)
HEMOGLOBIN F: 1.2 % (ref 0–2)
HEMOGLOBIN IDENTIFICATION INTERPRETATION: ABNORMAL
PATH REVIEW-HGB IDENTIFICATION: ABNORMAL
RH FACTOR (ANTIGEN D): NORMAL
YEAST SPEC QL CULT: ABNORMAL

## 2025-02-01 PROCEDURE — 2500000004 HC RX 250 GENERAL PHARMACY W/ HCPCS (ALT 636 FOR OP/ED): Performed by: ANESTHESIOLOGIST ASSISTANT

## 2025-02-01 PROCEDURE — 7100000001 HC RECOVERY ROOM TIME - INITIAL BASE CHARGE: Performed by: SURGERY

## 2025-02-01 PROCEDURE — 2500000002 HC RX 250 W HCPCS SELF ADMINISTERED DRUGS (ALT 637 FOR MEDICARE OP, ALT 636 FOR OP/ED)

## 2025-02-01 PROCEDURE — 7100000002 HC RECOVERY ROOM TIME - EACH INCREMENTAL 1 MINUTE: Performed by: SURGERY

## 2025-02-01 PROCEDURE — 2720000007 HC OR 272 NO HCPCS: Performed by: SURGERY

## 2025-02-01 PROCEDURE — 2500000004 HC RX 250 GENERAL PHARMACY W/ HCPCS (ALT 636 FOR OP/ED)

## 2025-02-01 PROCEDURE — 10061 I&D ABSCESS COMP/MULTIPLE: CPT | Performed by: SURGERY

## 2025-02-01 PROCEDURE — 2500000005 HC RX 250 GENERAL PHARMACY W/O HCPCS: Performed by: SURGERY

## 2025-02-01 PROCEDURE — 86901 BLOOD TYPING SEROLOGIC RH(D): CPT

## 2025-02-01 PROCEDURE — 3700000001 HC GENERAL ANESTHESIA TIME - INITIAL BASE CHARGE: Performed by: SURGERY

## 2025-02-01 PROCEDURE — 87206 SMEAR FLUORESCENT/ACID STAI: CPT | Performed by: SURGERY

## 2025-02-01 PROCEDURE — 2500000004 HC RX 250 GENERAL PHARMACY W/ HCPCS (ALT 636 FOR OP/ED): Mod: JZ | Performed by: STUDENT IN AN ORGANIZED HEALTH CARE EDUCATION/TRAINING PROGRAM

## 2025-02-01 PROCEDURE — 2500000001 HC RX 250 WO HCPCS SELF ADMINISTERED DRUGS (ALT 637 FOR MEDICARE OP)

## 2025-02-01 PROCEDURE — 1210000001 HC SEMI-PRIVATE ROOM DAILY

## 2025-02-01 PROCEDURE — 2500000005 HC RX 250 GENERAL PHARMACY W/O HCPCS

## 2025-02-01 PROCEDURE — 2500000005 HC RX 250 GENERAL PHARMACY W/O HCPCS: Performed by: ANESTHESIOLOGIST ASSISTANT

## 2025-02-01 PROCEDURE — 3600000002 HC OR TIME - INITIAL BASE CHARGE - PROCEDURE LEVEL TWO: Performed by: SURGERY

## 2025-02-01 PROCEDURE — 87102 FUNGUS ISOLATION CULTURE: CPT | Performed by: SURGERY

## 2025-02-01 PROCEDURE — 82947 ASSAY GLUCOSE BLOOD QUANT: CPT

## 2025-02-01 PROCEDURE — 3600000007 HC OR TIME - EACH INCREMENTAL 1 MINUTE - PROCEDURE LEVEL TWO: Performed by: SURGERY

## 2025-02-01 PROCEDURE — 3700000002 HC GENERAL ANESTHESIA TIME - EACH INCREMENTAL 1 MINUTE: Performed by: SURGERY

## 2025-02-01 PROCEDURE — 87077 CULTURE AEROBIC IDENTIFY: CPT | Performed by: SURGERY

## 2025-02-01 PROCEDURE — 36415 COLL VENOUS BLD VENIPUNCTURE: CPT

## 2025-02-01 RX ORDER — GLYCOPYRROLATE 0.2 MG/ML
INJECTION INTRAMUSCULAR; INTRAVENOUS AS NEEDED
Status: DISCONTINUED | OUTPATIENT
Start: 2025-02-01 | End: 2025-02-01

## 2025-02-01 RX ORDER — LIDOCAINE HYDROCHLORIDE 10 MG/ML
0.1 INJECTION, SOLUTION INFILTRATION; PERINEURAL ONCE
Status: DISCONTINUED | OUTPATIENT
Start: 2025-02-01 | End: 2025-02-01 | Stop reason: HOSPADM

## 2025-02-01 RX ORDER — ACETAMINOPHEN 325 MG/1
650 TABLET ORAL EVERY 4 HOURS PRN
Status: DISCONTINUED | OUTPATIENT
Start: 2025-02-01 | End: 2025-02-01 | Stop reason: HOSPADM

## 2025-02-01 RX ORDER — OXYCODONE HYDROCHLORIDE 5 MG/1
5 TABLET ORAL EVERY 4 HOURS PRN
Status: DISCONTINUED | OUTPATIENT
Start: 2025-02-01 | End: 2025-02-01 | Stop reason: HOSPADM

## 2025-02-01 RX ORDER — ONDANSETRON HYDROCHLORIDE 2 MG/ML
4 INJECTION, SOLUTION INTRAVENOUS ONCE AS NEEDED
Status: DISCONTINUED | OUTPATIENT
Start: 2025-02-01 | End: 2025-02-01 | Stop reason: HOSPADM

## 2025-02-01 RX ORDER — DEXTROSE 50 % IN WATER (D50W) INTRAVENOUS SYRINGE
12.5
Status: DISPENSED | OUTPATIENT
Start: 2025-02-01

## 2025-02-01 RX ORDER — FENTANYL CITRATE 50 UG/ML
INJECTION, SOLUTION INTRAMUSCULAR; INTRAVENOUS AS NEEDED
Status: DISCONTINUED | OUTPATIENT
Start: 2025-02-01 | End: 2025-02-01

## 2025-02-01 RX ORDER — DEXTROSE 50 % IN WATER (D50W) INTRAVENOUS SYRINGE
25
Status: ACTIVE | OUTPATIENT
Start: 2025-02-01

## 2025-02-01 RX ORDER — INSULIN GLARGINE 100 [IU]/ML
10 INJECTION, SOLUTION SUBCUTANEOUS NIGHTLY
Status: DISPENSED | OUTPATIENT
Start: 2025-02-01

## 2025-02-01 RX ORDER — SODIUM CHLORIDE 0.9 G/100ML
INJECTION, SOLUTION IRRIGATION AS NEEDED
Status: DISCONTINUED | OUTPATIENT
Start: 2025-02-01 | End: 2025-02-01 | Stop reason: HOSPADM

## 2025-02-01 RX ORDER — OXYCODONE HYDROCHLORIDE 5 MG/1
10 TABLET ORAL EVERY 4 HOURS PRN
Status: DISCONTINUED | OUTPATIENT
Start: 2025-02-01 | End: 2025-02-01 | Stop reason: HOSPADM

## 2025-02-01 RX ORDER — ALBUTEROL SULFATE 0.83 MG/ML
2.5 SOLUTION RESPIRATORY (INHALATION) ONCE AS NEEDED
Status: DISCONTINUED | OUTPATIENT
Start: 2025-02-01 | End: 2025-02-01 | Stop reason: HOSPADM

## 2025-02-01 RX ORDER — DIPHENHYDRAMINE HCL 25 MG
25 CAPSULE ORAL ONCE
Status: COMPLETED | OUTPATIENT
Start: 2025-02-01 | End: 2025-02-01

## 2025-02-01 RX ORDER — HYDROMORPHONE HYDROCHLORIDE 1 MG/ML
INJECTION, SOLUTION INTRAMUSCULAR; INTRAVENOUS; SUBCUTANEOUS AS NEEDED
Status: DISCONTINUED | OUTPATIENT
Start: 2025-02-01 | End: 2025-02-01

## 2025-02-01 RX ORDER — PROPOFOL 10 MG/ML
INJECTION, EMULSION INTRAVENOUS AS NEEDED
Status: DISCONTINUED | OUTPATIENT
Start: 2025-02-01 | End: 2025-02-01

## 2025-02-01 RX ORDER — INSULIN LISPRO 100 [IU]/ML
6 INJECTION, SOLUTION INTRAVENOUS; SUBCUTANEOUS
Status: DISCONTINUED | OUTPATIENT
Start: 2025-02-01 | End: 2025-02-02

## 2025-02-01 RX ORDER — INSULIN LISPRO 100 [IU]/ML
0-5 INJECTION, SOLUTION INTRAVENOUS; SUBCUTANEOUS ONCE
Status: COMPLETED | OUTPATIENT
Start: 2025-02-01 | End: 2025-02-01

## 2025-02-01 RX ORDER — LIDOCAINE HYDROCHLORIDE 40 MG/ML
SOLUTION TOPICAL AS NEEDED
Status: DISCONTINUED | OUTPATIENT
Start: 2025-02-01 | End: 2025-02-01

## 2025-02-01 RX ORDER — SUCCINYLCHOLINE CHLORIDE 20 MG/ML
INJECTION INTRAMUSCULAR; INTRAVENOUS AS NEEDED
Status: DISCONTINUED | OUTPATIENT
Start: 2025-02-01 | End: 2025-02-01

## 2025-02-01 RX ORDER — HYDROMORPHONE HYDROCHLORIDE 0.2 MG/ML
0.2 INJECTION INTRAMUSCULAR; INTRAVENOUS; SUBCUTANEOUS 2 TIMES DAILY PRN
Status: DISPENSED | OUTPATIENT
Start: 2025-02-01

## 2025-02-01 RX ORDER — AMOXICILLIN AND CLAVULANATE POTASSIUM 875; 125 MG/1; MG/1
1 TABLET, FILM COATED ORAL EVERY 12 HOURS SCHEDULED
Status: DISPENSED | OUTPATIENT
Start: 2025-02-01 | End: 2025-02-08

## 2025-02-01 RX ORDER — HYDROMORPHONE HYDROCHLORIDE 0.2 MG/ML
0.2 INJECTION INTRAMUSCULAR; INTRAVENOUS; SUBCUTANEOUS EVERY 5 MIN PRN
Status: DISCONTINUED | OUTPATIENT
Start: 2025-02-01 | End: 2025-02-01 | Stop reason: HOSPADM

## 2025-02-01 RX ADMIN — ACETAMINOPHEN 975 MG: 325 TABLET ORAL at 11:47

## 2025-02-01 RX ADMIN — LIDOCAINE HYDROCHLORIDE 4 ML: 40 SOLUTION TOPICAL at 08:19

## 2025-02-01 RX ADMIN — DEXAMETHASONE SODIUM PHOSPHATE 8 MG: 4 INJECTION INTRA-ARTICULAR; INTRALESIONAL; INTRAMUSCULAR; INTRAVENOUS; SOFT TISSUE at 08:30

## 2025-02-01 RX ADMIN — PROPOFOL 50 MG: 10 INJECTION, EMULSION INTRAVENOUS at 08:43

## 2025-02-01 RX ADMIN — INSULIN LISPRO 6 UNITS: 100 INJECTION, SOLUTION INTRAVENOUS; SUBCUTANEOUS at 20:08

## 2025-02-01 RX ADMIN — INSULIN GLARGINE 10 UNITS: 100 INJECTION, SOLUTION SUBCUTANEOUS at 21:54

## 2025-02-01 RX ADMIN — VANCOMYCIN HYDROCHLORIDE 1500 MG: 5 INJECTION, POWDER, LYOPHILIZED, FOR SOLUTION INTRAVENOUS at 08:32

## 2025-02-01 RX ADMIN — MICONAZOLE NITRATE 1 APPLICATION: 20 CREAM VAGINAL at 21:56

## 2025-02-01 RX ADMIN — ACETAMINOPHEN 975 MG: 325 TABLET ORAL at 22:01

## 2025-02-01 RX ADMIN — VANCOMYCIN HYDROCHLORIDE 1500 MG: 5 INJECTION, POWDER, LYOPHILIZED, FOR SOLUTION INTRAVENOUS at 06:17

## 2025-02-01 RX ADMIN — INSULIN LISPRO 6 UNITS: 100 INJECTION, SOLUTION INTRAVENOUS; SUBCUTANEOUS at 13:31

## 2025-02-01 RX ADMIN — HYDROMORPHONE HYDROCHLORIDE 0.5 MG: 0.5 INJECTION, SOLUTION INTRAMUSCULAR; INTRAVENOUS; SUBCUTANEOUS at 09:05

## 2025-02-01 RX ADMIN — AMOXICILLIN AND CLAVULANATE POTASSIUM 1 TABLET: 875; 125 TABLET, FILM COATED ORAL at 21:55

## 2025-02-01 RX ADMIN — PIPERACILLIN SODIUM AND TAZOBACTAM SODIUM 3.38 G: 3; .375 INJECTION, SOLUTION INTRAVENOUS at 01:32

## 2025-02-01 RX ADMIN — PRENATAL VIT W/ FE FUMARATE-FA TAB 27-0.8 MG 1 TABLET: 27-0.8 TAB at 11:24

## 2025-02-01 RX ADMIN — PROPOFOL 150 MG: 10 INJECTION, EMULSION INTRAVENOUS at 08:34

## 2025-02-01 RX ADMIN — PROPOFOL 150 MG: 10 INJECTION, EMULSION INTRAVENOUS at 08:18

## 2025-02-01 RX ADMIN — SUCCINYLCHOLINE CHLORIDE 100 MG: 20 INJECTION, SOLUTION INTRAMUSCULAR; INTRAVENOUS at 08:18

## 2025-02-01 RX ADMIN — PROPOFOL 50 MG: 10 INJECTION, EMULSION INTRAVENOUS at 08:48

## 2025-02-01 RX ADMIN — ONDANSETRON 4 MG: 2 INJECTION INTRAMUSCULAR; INTRAVENOUS at 08:30

## 2025-02-01 RX ADMIN — GLYCOPYRROLATE 0.3 MG: 0.2 INJECTION INTRAMUSCULAR; INTRAVENOUS at 08:07

## 2025-02-01 RX ADMIN — TAZOBACTAM SODIUM AND PIPERACILLIN SODIUM 3.38 G: 250; 2 INJECTION, SOLUTION INTRAVENOUS at 08:32

## 2025-02-01 RX ADMIN — HYDROMORPHONE HYDROCHLORIDE 1 MG: 1 INJECTION, SOLUTION INTRAMUSCULAR; INTRAVENOUS; SUBCUTANEOUS at 08:51

## 2025-02-01 RX ADMIN — DEXTROSE MONOHYDRATE 12.5 G: 25 INJECTION, SOLUTION INTRAVENOUS at 00:38

## 2025-02-01 RX ADMIN — DEXTROSE MONOHYDRATE 12.5 G: 25 INJECTION, SOLUTION INTRAVENOUS at 12:18

## 2025-02-01 RX ADMIN — FENTANYL CITRATE 100 MCG: 50 INJECTION, SOLUTION INTRAMUSCULAR; INTRAVENOUS at 08:07

## 2025-02-01 RX ADMIN — DIPHENHYDRAMINE HYDROCHLORIDE 25 MG: 25 CAPSULE ORAL at 11:24

## 2025-02-01 RX ADMIN — INSULIN LISPRO 3 UNITS: 100 INJECTION, SOLUTION INTRAVENOUS; SUBCUTANEOUS at 23:14

## 2025-02-01 SDOH — HEALTH STABILITY: MENTAL HEALTH: CURRENT SMOKER: 0

## 2025-02-01 ASSESSMENT — PAIN - FUNCTIONAL ASSESSMENT
PAIN_FUNCTIONAL_ASSESSMENT: 0-10

## 2025-02-01 ASSESSMENT — PAIN SCALES - GENERAL
PAINLEVEL_OUTOF10: 0 - NO PAIN
PAINLEVEL_OUTOF10: 0 - NO PAIN
PAINLEVEL_OUTOF10: 3
PAINLEVEL_OUTOF10: 1
PAINLEVEL_OUTOF10: 7
PAINLEVEL_OUTOF10: 7
PAINLEVEL_OUTOF10: 1
PAINLEVEL_OUTOF10: 0 - NO PAIN
PAINLEVEL_OUTOF10: 0 - NO PAIN
PAINLEVEL_OUTOF10: 4
PAINLEVEL_OUTOF10: 3
PAINLEVEL_OUTOF10: 5 - MODERATE PAIN
PAINLEVEL_OUTOF10: 5 - MODERATE PAIN

## 2025-02-01 ASSESSMENT — PAIN DESCRIPTION - ORIENTATION
ORIENTATION: INNER
ORIENTATION: LEFT

## 2025-02-01 ASSESSMENT — PAIN DESCRIPTION - LOCATION
LOCATION: BUTTOCKS
LOCATION: BUTTOCKS

## 2025-02-01 NOTE — PROGRESS NOTES
Vancomycin Dosing by Pharmacy- Cessation of Therapy    Consult to pharmacy for vancomycin dosing has been discontinued by the prescriber, pharmacy will sign off at this time.    Please call pharmacy if there are further questions or re-enter a consult if vancomycin is resumed.     LORETA GROSS

## 2025-02-01 NOTE — ANESTHESIA POSTPROCEDURE EVALUATION
"Patient: Xavier Delacruz \"Karen\"    Procedure Summary       Date: 02/01/25 Room / Location: [facility] OR  / [facility] OR    Anesthesia Start: 0807 Anesthesia Stop: 0909    Procedure: INCISION AND DRAINAGE, BUTTOCK, possible debridement, possible wound vac placement (Left) Diagnosis:       Abscess      (Abscess [L02.91])    Surgeons: Jim Hatfield DO Responsible Provider: Tessa Ann MD    Anesthesia Type: general ASA Status: 3            Anesthesia Type: general    Vitals Value Taken Time   /66 02/01/25 0905   Temp 36 °C (96.8 °F) 02/01/25 0905   Pulse 84 02/01/25 0909   Resp 14 02/01/25 0909   SpO2 94 % 02/01/25 0909   Vitals shown include unfiled device data.    Anesthesia Post Evaluation    Patient location during evaluation: PACU  Patient participation: complete - patient participated  Level of consciousness: awake and alert  Pain management: adequate  Airway patency: patent  Cardiovascular status: acceptable  Respiratory status: acceptable  Hydration status: acceptable  Postoperative Nausea and Vomiting: none        No notable events documented.    "

## 2025-02-01 NOTE — PROGRESS NOTES
ANTEPARTUM PROGRESS NOTE   2025, 6:39 AM     SUBJECTIVE:   Patient feeling well this morning; seen while getting blood drawn. Has been NPO since MN. Looking forward to surgery today. Denies pain. Denies any further symptoms of hypoglycemia overnight.     OBJECTIVE:   /71 (BP Location: Left arm, Patient Position: Lying)   Pulse 71   Temp 37 °C (98.6 °F) (Temporal)   Resp 16   Ht 1.524 m (5')   Wt 91.2 kg (201 lb)   SpO2 97%   BMI 39.26 kg/m²    Temp  Min: 36.3 °C (97.3 °F)  Max: 37.3 °C (99.1 °F)  Pulse  Min: 59  Max: 81  BP  Min: 114/71  Max: 125/70    General:  AAOx3, No acute distress  Cardiovascular: Warm and well perfused  Respiratory: Normal respiratory effort   Abdominal:  Soft, gravid, non-tender  Extremities: Warm, well perfused    Labs:   Lab Results   Component Value Date    WBC 7.8 2025    HGB 9.9 (L) 2025    HCT 32.3 (L) 2025     (H) 2025        Latest Reference Range & Units 25 19:10 25 19:46 25 01:34 25 03:11 25 06:41   POCT Glucose 74 - 99 mg/dL 89 127 (H) 371 (H) 313 (H) 43 (L)       US Pelvis ()  IMPRESSION:  *Single intrauterine gestational sac of unknown viability,  corresponding to a gestational age of 5 weeks 3 days. No fetal pole  or heart rate identified which can be secondary to early pregnancy.  Follow-up ultrasound in 14 days is recommended to assess viability.      ASSESSMENT AND PLAN:     25 yo  at 6w0d by first trimester US with PMH uncontrolled pre gestational diabetes with CKD and cHTN presenting from office for glycemic optimization and evaluation of left gluteal cleft abscess.      Uncontrolled pre-gestational diabetes   - Most recent A1C 16.9% in 2024, patient reports no insulin use for over a year.   - Hyperglycemic with , slightly elevated BHB to 0.37 on admission. No evidence of DKA  - Prior regimen (per chart review, pt not taking):  Lantus 32 units, Humalog 12 units before  meals and Metformin 1000 mg AM  - Current regimen (while eating): Lantus 20/32 (1200, 0000), Humalog 12u with meals with pre-prandial SSI. For fasting, pre prandial and 1 hour post prandial Bgs while eating. Note that timing of long-acting insulin is off due to patient getting first mid-morning in ED. Will work towards timing of long acting   -For procedure today, overnight dose of lantus was 10u @2100 and 12u lispro with dinner  - Currently NPO in s/o gluteal abscess procedure with Gen Surg today; for q4 POC BG while NPO> continue this AM.  - Repeat A1C 15.0      Left Gluteal Cleft Abscess   - CT pelvis notable for marked subcutaneous soft tissue stranding/skin thickening extending to the perineal region with mildly enlarged left inguinal lymph nodes with c/f an anal fistula tract   - s/p bedside I&D 1/29 with ACS, significant amount of purulent drainage. Continuing to drain. Currently NPO for possible procedure today.   - Urine cx collected w/ contamination. Plan to repeat if clinically indicated.   - Cont vanc/zosyn (1/29-) no cultures currently pending, will considering discontinuing    cHTN  - on chart review, not on meds  - baseline HELLP labs wnl, P:C 0.47     Routine prenatal care   - TVUS on 1/28 notable for GS with MSD of 7 mm w/o fetal pole. For repeat TVUS in 2 weeks   - PNLs ordered wnl     Dispo: pending improved glycemic control, improvement in gluteal abscess     To be seen and d/w Dr. Lacho Duarte,     Naa Bonilla MD PGY-1  MFM    Principal Problem:    Type 2 diabetes mellitus affecting pregnancy in first trimester, antepartum (HHS-HCC)  Active Problems:    Hyperglycemia during pregnancy (HHS-HCC)    Abscess

## 2025-02-01 NOTE — CARE PLAN
The patient's goals for the shift include rest    The clinical goals for the shift include blood glucose throughout shift    Over the shift, the patient did make progress toward the following goals. Pt went for I&D of L gluteal abscess. Post-op pain well managed with PO Tylenol. MD assessed at the bedside d/t wound exudate soaking through dressing; general surgery contacted and changed dressing. Had an episode of hypoglycemia with blood glucose of 56. RN treated with snack and IV dextrose per MD. Denies OB complaints at this time. Denies any needs at this time.

## 2025-02-01 NOTE — ANESTHESIA PREPROCEDURE EVALUATION
"Patient: Xavier Delacruz \"Karen\"    Evaluation Method: In-person visit    Procedure Information       Date/Time: 01/31/25 0815    Procedure: INCISION AND DRAINAGE, BUTTOCK, possible debridement, possible wound vac placement (Left)    Location: [facility] OR 11 / [facility] OR    Surgeons: Jim Hatfield, DO          23 yo F ~6 WGA with gluteal abscess presenting to OR for I&D.  PMH: HTN, uncontrolled IDDM, CKD  ANES: No prior GA; no known family hx of anesthesia complications  NKDA  Denies EtOH, tobacco, illicit drug use  NPO > 8 HR    Relevant Problems   Cardiac   (+) Chronic hypertension in pregnancy (HHS-HCC)      Endocrine   (+) Type 2 diabetes mellitus affecting pregnancy in first trimester, antepartum (HHS-HCC)   (+) Type 2 diabetes mellitus with stage 1 chronic kidney disease, with long-term current use of insulin (Multi)      Infectious Diseases   (+) Abscess       Clinical information reviewed:   Tobacco  Allergies  Meds   Med Hx  Surg Hx  OB Status  Fam Hx  Soc   Hx        NPO Detail:  NPO/Void Status  Carbohydrate Drink Given Prior to Surgery? : N  Date of Last Liquid: 01/31/25  Time of Last Liquid: 2200  Date of Last Solid: 01/31/25  Time of Last Solid: 2200  Last Intake Type: Clear fluids; Light meal  Time of Last Void: 0700         OB/Gyn Evaluation    Present Pregnancy    Patient is pregnant now.   Obstetric History                Physical Exam    Airway  Mallampati: I  TM distance: >3 FB  Neck ROM: full     Cardiovascular   Rhythm: regular  Rate: normal     Dental - normal exam     Pulmonary    Abdominal            Anesthesia Plan    History of general anesthesia?: no  History of complications of general anesthesia?: unknown/emergency    ASA 3     general     The patient is not a current smoker.    intravenous induction   Postoperative administration of opioids is intended.  Trial extubation is planned.  Anesthetic plan and risks discussed with patient.  Use of blood products " discussed with patient who consented to blood products.    Plan discussed with JING.

## 2025-02-01 NOTE — ANESTHESIA PROCEDURE NOTES
Airway  Date/Time: 2/1/2025 8:21 AM  Urgency: elective    Airway not difficult    Staffing  Performed: resident   Authorized by: JING Reynoso    Performed by: JING Reynoso  Patient location during procedure: OR    Indications and Patient Condition  Indications for airway management: anesthesia  Spontaneous Ventilation: absent  Sedation level: deep  Preoxygenated: yes  Patient position: sniffing  Mask difficulty assessment: 0 - not attempted    Final Airway Details  Final airway type: endotracheal airway      Successful airway: ETT  Cuffed: yes   Successful intubation technique: video laryngoscopy  Endotracheal tube insertion site: oral  Blade: Glenn  Blade size: #4  ETT size (mm): 7.0  Cormack-Lehane Classification: grade I - full view of glottis  Placement verified by: chest auscultation   Measured from: lips  ETT to lips (cm): 22  Number of attempts at approach: 1  Ventilation between attempts: BVM    Additional Comments  Easy by resident with lt Tristana , NO stylet used

## 2025-02-01 NOTE — OP NOTE
"INCISION AND DRAINAGE, BUTTOCK, possible debridement, possible wound vac placement (L) Operative Note     Date: 2025 - 2025  OR Location: [facility] OR    Name: Xavier Delacruz \"Anum", : 2000, Age: 24 y.o., MRN: [id number], Sex: female    Diagnosis  Pre-op Diagnosis      * Abscess [L02.91] Post-op Diagnosis     * Abscess [L02.91]     Procedures  INCISION AND DRAINAGE, BUTTOCK, possible debridement, possible wound vac placement  34940 - TN INCISION & DRAINAGE ABSCESS SIMPLE/SINGLE      Surgeons      * Jim Hatfield - Primary    Resident/Fellow/Other Assistant:  Surgeons and Role:     * Silvia Perez MD - Resident - Assisting    Staff:   Circulator: Rubi Goldenub Person: Maribel    Anesthesia Staff: Anesthesiologist: Tessa Ann MD  C-AA: JING Reynoso    Procedure Summary  Anesthesia: Anesthesia type not filed in the log.  ASA: III  Estimated Blood Loss: 5mL  Intra-op Medications:   Administrations occurring from 0815 to 0950 on 25:   Medication Name Total Dose   sodium chloride 0.9 % irrigation solution 1,000 mL   dexAMETHasone (Decadron) injection 4 mg/mL 8 mg   dexmedeTOMIDine (Precedex) bolus from bag 20 mcg   HYDROmorphone (Dilaudid) injection 1 mg/mL 1 mg   lidocaine (LTA Kit) for intubation 4 mL   piperacillin-tazobactam (Zosyn) IV 2.25 g in 50 mL (premix) 3.375 g   propofol (Diprivan) injection 10 mg/mL 400 mg   succinylcholine (Anectine) 20 mg/mL injection 100 mg   vancomycin (Vancocin) 1,500 mg in sodium chloride 0.9% 500 mL  mL              Anesthesia Record               Intraprocedure I/O Totals          Intake    Dexmedetomidine 0.00 mL    The total shown is the total volume documented since Anesthesia Start was filed.    Total Intake 0 mL       Output    Est. Blood Loss 5 mL    Total Output 5 mL       Net    Net Volume -5 mL          Specimen:   ID Type Source Tests Collected by Time   A : left gluteal abscess #1 Swab ABSCESS FUNGAL CULTURE/SMEAR, " "TISSUE/WOUND CULTURE/SMEAR Jim Hatfield, DO 2/1/2025 0841   B : left gluteal abscess #2 Swab ABSCESS FUNGAL CULTURE/SMEAR, TISSUE/WOUND CULTURE/SMEAR Jim Hatfield, DO 2/1/2025 0846                 Drains and/or Catheters: * None in log *      Findings: [id number] abscess cavity, loculations broken up, irrigated. X2 cultures sent. Packed with moistened kerlix and abd.     Indications: Xavier Delacruz \"Anum" is an 24 y.o. female who is having surgery for Abscess [L02.91]. Patient has a left gluteal abscess that she could not tolerate bedside drainage. As well, she had very elevated glucose making her high risk for worsening infection. Decision made to proceed to OR for further examination and washout.     The patient was seen in the preoperative area. The risks, benefits, complications, treatment options, non-operative alternatives, expected recovery and outcomes were discussed with the patient. The possibilities of reaction to medication, pulmonary aspiration, injury to surrounding structures, bleeding, recurrent infection, the need for additional procedures, failure to diagnose a condition, and creating a complication requiring transfusion or operation were discussed with the patient. The patient concurred with the proposed plan, giving informed consent.  The site of surgery was properly noted/marked if necessary per policy. The patient has been actively warmed in preoperative area. Preoperative antibiotics have been ordered and given within 1 hours of incision. Venous thrombosis prophylaxis have been ordered including bilateral sequential compression devices    Procedure Details:   Patient was brought to the OR. A huddle and timeout were completed identifying the patient name, mrn and procedure planned. General anesthesia was induced and the patient was intubated with ETT. She was placed prone on the operating table. The patient was prepped and draped in sterile fashion. A small subcentimeter incision from " prior attempt was seen and was extended in cruciate fashion to gain access to the cavity. Additional purulent fluid washed from the cavity and two culture swabs taken. Loculations were broken up and the cavity was further irrigated with saline. The cavity measured [id number].   Hemostasis was achieved with bovie electrocautery.   Kerlix moistened gauze was packed into the wound and an ABD pad taped over as well as mesh panties.   The patient tolerated the procedure without issue. She was extubated at the end of the case and safely transported to the PACU.       Complications:  None; patient tolerated the procedure well.    Disposition: PACU - hemodynamically stable.  Condition: stable                 Additional Details: Ok for PO antibiotics, Augmentin for 7 days    Attending Attestation:     Jim Hatfield  Phone Number: [phone]

## 2025-02-02 VITALS
WEIGHT: 201 LBS | HEART RATE: 64 BPM | RESPIRATION RATE: 18 BRPM | SYSTOLIC BLOOD PRESSURE: 128 MMHG | BODY MASS INDEX: 39.46 KG/M2 | DIASTOLIC BLOOD PRESSURE: 72 MMHG | OXYGEN SATURATION: 99 % | TEMPERATURE: 98.8 F | HEIGHT: 60 IN

## 2025-02-02 LAB
ANION GAP SERPL CALC-SCNC: 13 MMOL/L (ref 10–20)
BACTERIA SPEC CULT: ABNORMAL
BACTERIA SPEC CULT: ABNORMAL
BUN SERPL-MCNC: 7 MG/DL (ref 6–23)
CALCIUM SERPL-MCNC: 8.9 MG/DL (ref 8.6–10.6)
CHLORIDE SERPL-SCNC: 99 MMOL/L (ref 98–107)
CO2 SERPL-SCNC: 26 MMOL/L (ref 21–32)
CREAT SERPL-MCNC: 0.71 MG/DL (ref 0.5–1.05)
EGFRCR SERPLBLD CKD-EPI 2021: >90 ML/MIN/1.73M*2
ERYTHROCYTE [DISTWIDTH] IN BLOOD BY AUTOMATED COUNT: 12.8 % (ref 11.5–14.5)
FUNGUS SPEC CULT: NORMAL
FUNGUS SPEC FUNGUS STN: NORMAL
FUNGUS SPEC FUNGUS STN: NORMAL
GLUCOSE BLD MANUAL STRIP-MCNC: 160 MG/DL (ref 74–99)
GLUCOSE BLD MANUAL STRIP-MCNC: 190 MG/DL (ref 74–99)
GLUCOSE BLD MANUAL STRIP-MCNC: 209 MG/DL (ref 74–99)
GLUCOSE BLD MANUAL STRIP-MCNC: 265 MG/DL (ref 74–99)
GLUCOSE BLD MANUAL STRIP-MCNC: 268 MG/DL (ref 74–99)
GLUCOSE BLD MANUAL STRIP-MCNC: 276 MG/DL (ref 74–99)
GLUCOSE BLD MANUAL STRIP-MCNC: 30 MG/DL (ref 74–99)
GLUCOSE BLD MANUAL STRIP-MCNC: 56 MG/DL (ref 74–99)
GLUCOSE SERPL-MCNC: 239 MG/DL (ref 74–99)
GRAM STN SPEC: ABNORMAL
HCT VFR BLD AUTO: 30.5 % (ref 36–46)
HGB BLD-MCNC: 9.5 G/DL (ref 12–16)
MCH RBC QN AUTO: 23.3 PG (ref 26–34)
MCHC RBC AUTO-ENTMCNC: 31.1 G/DL (ref 32–36)
MCV RBC AUTO: 75 FL (ref 80–100)
NRBC BLD-RTO: 0 /100 WBCS (ref 0–0)
PLATELET # BLD AUTO: 368 X10*3/UL (ref 150–450)
POTASSIUM SERPL-SCNC: 3.4 MMOL/L (ref 3.5–5.3)
RBC # BLD AUTO: 4.07 X10*6/UL (ref 4–5.2)
SODIUM SERPL-SCNC: 135 MMOL/L (ref 136–145)
WBC # BLD AUTO: 4.6 X10*3/UL (ref 4.4–11.3)

## 2025-02-02 PROCEDURE — 2500000002 HC RX 250 W HCPCS SELF ADMINISTERED DRUGS (ALT 637 FOR MEDICARE OP, ALT 636 FOR OP/ED)

## 2025-02-02 PROCEDURE — 2500000005 HC RX 250 GENERAL PHARMACY W/O HCPCS

## 2025-02-02 PROCEDURE — 99024 POSTOP FOLLOW-UP VISIT: CPT | Performed by: SURGERY

## 2025-02-02 PROCEDURE — 2500000004 HC RX 250 GENERAL PHARMACY W/ HCPCS (ALT 636 FOR OP/ED): Mod: JZ

## 2025-02-02 PROCEDURE — 85027 COMPLETE CBC AUTOMATED: CPT

## 2025-02-02 PROCEDURE — 82947 ASSAY GLUCOSE BLOOD QUANT: CPT

## 2025-02-02 PROCEDURE — 84520 ASSAY OF UREA NITROGEN: CPT

## 2025-02-02 PROCEDURE — 1210000001 HC SEMI-PRIVATE ROOM DAILY

## 2025-02-02 PROCEDURE — 2500000001 HC RX 250 WO HCPCS SELF ADMINISTERED DRUGS (ALT 637 FOR MEDICARE OP)

## 2025-02-02 PROCEDURE — 2500000004 HC RX 250 GENERAL PHARMACY W/ HCPCS (ALT 636 FOR OP/ED)

## 2025-02-02 PROCEDURE — 99233 SBSQ HOSP IP/OBS HIGH 50: CPT

## 2025-02-02 RX ORDER — INSULIN LISPRO 100 [IU]/ML
6 INJECTION, SOLUTION INTRAVENOUS; SUBCUTANEOUS
Status: DISCONTINUED | OUTPATIENT
Start: 2025-02-03 | End: 2025-02-02

## 2025-02-02 RX ORDER — INSULIN LISPRO 100 [IU]/ML
6 INJECTION, SOLUTION INTRAVENOUS; SUBCUTANEOUS ONCE
Status: COMPLETED | OUTPATIENT
Start: 2025-02-02 | End: 2025-02-02

## 2025-02-02 RX ORDER — VANCOMYCIN HYDROCHLORIDE 1 G/20ML
INJECTION, POWDER, LYOPHILIZED, FOR SOLUTION INTRAVENOUS DAILY PRN
Status: DISCONTINUED | OUTPATIENT
Start: 2025-02-02 | End: 2025-02-02

## 2025-02-02 RX ORDER — INSULIN LISPRO 100 [IU]/ML
0-5 INJECTION, SOLUTION INTRAVENOUS; SUBCUTANEOUS
Status: DISCONTINUED | OUTPATIENT
Start: 2025-02-02 | End: 2025-02-02

## 2025-02-02 RX ORDER — INSULIN LISPRO 100 [IU]/ML
6 INJECTION, SOLUTION INTRAVENOUS; SUBCUTANEOUS
Status: ACTIVE | OUTPATIENT
Start: 2025-02-02

## 2025-02-02 RX ORDER — INSULIN LISPRO 100 [IU]/ML
0-5 INJECTION, SOLUTION INTRAVENOUS; SUBCUTANEOUS
Status: ACTIVE | OUTPATIENT
Start: 2025-02-02

## 2025-02-02 RX ORDER — INSULIN LISPRO 100 [IU]/ML
12 INJECTION, SOLUTION INTRAVENOUS; SUBCUTANEOUS
Status: DISCONTINUED | OUTPATIENT
Start: 2025-02-02 | End: 2025-02-02

## 2025-02-02 RX ORDER — GUAIFENESIN 600 MG/1
600 TABLET, EXTENDED RELEASE ORAL 2 TIMES DAILY PRN
Status: DISPENSED | OUTPATIENT
Start: 2025-02-02

## 2025-02-02 RX ORDER — LORAZEPAM 2 MG/ML
1 INJECTION INTRAMUSCULAR 2 TIMES DAILY PRN
Status: ACTIVE | OUTPATIENT
Start: 2025-02-02

## 2025-02-02 RX ORDER — FAMOTIDINE 20 MG/1
20 TABLET, FILM COATED ORAL ONCE
Status: COMPLETED | OUTPATIENT
Start: 2025-02-02 | End: 2025-02-02

## 2025-02-02 RX ADMIN — AMOXICILLIN AND CLAVULANATE POTASSIUM 1 TABLET: 875; 125 TABLET, FILM COATED ORAL at 13:23

## 2025-02-02 RX ADMIN — INSULIN LISPRO 6 UNITS: 100 INJECTION, SOLUTION INTRAVENOUS; SUBCUTANEOUS at 22:18

## 2025-02-02 RX ADMIN — HYDROMORPHONE HYDROCHLORIDE 0.2 MG: 0.2 INJECTION, SOLUTION INTRAMUSCULAR; INTRAVENOUS; SUBCUTANEOUS at 11:34

## 2025-02-02 RX ADMIN — AMOXICILLIN AND CLAVULANATE POTASSIUM 1 TABLET: 875; 125 TABLET, FILM COATED ORAL at 21:48

## 2025-02-02 RX ADMIN — INSULIN LISPRO 12 UNITS: 100 INJECTION, SOLUTION INTRAVENOUS; SUBCUTANEOUS at 10:16

## 2025-02-02 RX ADMIN — DEXTROSE MONOHYDRATE 12.5 G: 25 INJECTION, SOLUTION INTRAVENOUS at 13:35

## 2025-02-02 RX ADMIN — VANCOMYCIN HYDROCHLORIDE 1500 MG: 5 INJECTION, POWDER, LYOPHILIZED, FOR SOLUTION INTRAVENOUS at 02:45

## 2025-02-02 RX ADMIN — ACETAMINOPHEN 975 MG: 325 TABLET ORAL at 04:30

## 2025-02-02 RX ADMIN — INSULIN LISPRO 3 UNITS: 100 INJECTION, SOLUTION INTRAVENOUS; SUBCUTANEOUS at 20:18

## 2025-02-02 RX ADMIN — MICONAZOLE NITRATE 1 APPLICATION: 20 CREAM VAGINAL at 22:21

## 2025-02-02 RX ADMIN — INSULIN GLARGINE 10 UNITS: 100 INJECTION, SOLUTION SUBCUTANEOUS at 21:44

## 2025-02-02 RX ADMIN — GUAIFENESIN 600 MG: 600 TABLET ORAL at 20:07

## 2025-02-02 RX ADMIN — PIPERACILLIN SODIUM AND TAZOBACTAM SODIUM 3.38 G: 3; .375 INJECTION, SOLUTION INTRAVENOUS at 04:58

## 2025-02-02 RX ADMIN — FAMOTIDINE 20 MG: 20 TABLET, FILM COATED ORAL at 04:58

## 2025-02-02 RX ADMIN — HYDROMORPHONE HYDROCHLORIDE 0.2 MG: 0.2 INJECTION, SOLUTION INTRAMUSCULAR; INTRAVENOUS; SUBCUTANEOUS at 11:59

## 2025-02-02 RX ADMIN — PIPERACILLIN SODIUM AND TAZOBACTAM SODIUM 3.38 G: 3; .375 INJECTION, SOLUTION INTRAVENOUS at 09:56

## 2025-02-02 RX ADMIN — INSULIN LISPRO 2 UNITS: 100 INJECTION, SOLUTION INTRAVENOUS; SUBCUTANEOUS at 10:19

## 2025-02-02 ASSESSMENT — PAIN SCALES - GENERAL
PAINLEVEL_OUTOF10: 0 - NO PAIN
PAINLEVEL_OUTOF10: 0 - NO PAIN
PAINLEVEL_OUTOF10: 1
PAINLEVEL_OUTOF10: 5 - MODERATE PAIN
PAINLEVEL_OUTOF10: 0 - NO PAIN

## 2025-02-02 ASSESSMENT — PAIN - FUNCTIONAL ASSESSMENT
PAIN_FUNCTIONAL_ASSESSMENT: 0-10

## 2025-02-02 ASSESSMENT — PAIN DESCRIPTION - LOCATION: LOCATION: BUTTOCKS

## 2025-02-02 ASSESSMENT — PAIN DESCRIPTION - ORIENTATION: ORIENTATION: LEFT

## 2025-02-02 NOTE — PROGRESS NOTES
[facility]  ACUTE CARE SURGERY - PROGRESS NOTE    Patient Name: Xavier Delacruz  MRN: [id number]  Admit Date: 128  : 2000  AGE: 24 y.o.   GENDER: female  ==============================================================================  TODAY'S ASSESSMENT AND PLAN OF CARE:  Xavier Delacruz is a 24 y.o. female with history of T1DM, CKD, HTN  who presents to  ED for hyperglycemia, concern for DKA from clinic. ACS is consulted for gluteal abscess. Patient had bedside I&D on  with copious drainage of purulence; although somewhat limited evaluation 2/2 patient pain/anxiety.    POD 1 from drainage in OR. Wound appears good, clean.   Packing taken out at bedside and repacked.   Needs to be done BID by nursing  Follow up has been emailed--pt to follow up 1-2 weeks post discharge.   Please call ACS with questions or concerns.     ==============================================================================  CHIEF COMPLAINT / EVENTS LAST 24HRS / HPI:  none    MEDICAL HISTORY / ROS:   Admission history and ROS reviewed.     PHYSICAL EXAM:  Heart Rate:  [67-86]   Temp:  [36.8 °C (98.2 °F)-38.3 °C (100.9 °F)]   Resp:  [18]   BP: (108-158)/(53-90)   SpO2:  [98 %-100 %]   Physical Exam  Neurological: Awake, alert, conversive  Respiratory/Thorax: even, unlabored  Genitourinary: voiding  Gastrointestinal: soft, NT, ND. Wound on buttock packed with moistened kerlix. Clean, no purulence or further infection noted.   Skin: warm, dry  Musculoskeletal: HARRISON  Eyes: non-icteric  Extremities: no edema   Psychological: anxious       LABS:  Results from last 7 days   Lab Units 25  0048 25  1405 25  0642 25  1615   WBC AUTO x10*3/uL 4.6 7.8 10.2 11.0   HEMOGLOBIN g/dL 9.5* 9.9* 9.8* 10.8*   HEMATOCRIT % 30.5* 32.3* 32.4* 32.7*   PLATELETS AUTO x10*3/uL 368 512* 522* 477*   NEUTROS PCT AUTO %  --   --  51.0 72.5   LYMPHS PCT AUTO %  --   --  37.9 18.8   MONOS PCT  AUTO %  --   --  8.0 7.3   EOS PCT AUTO %  --   --  2.5 0.5         Results from last 7 days   Lab Units 02/02/25  0048 01/31/25  0704 01/30/25  0643 01/29/25  0506 01/28/25  1615   SODIUM mmol/L 135* 136 142 137 134*   POTASSIUM mmol/L 3.4* 4.3 3.0* 3.3* 3.5   CHLORIDE mmol/L 99 105 109* 103 99   CO2 mmol/L 26 22 24 24 25   BUN mg/dL 7 4* 6 7 8   CREATININE mg/dL 0.71 0.46* 0.53 0.47* 0.60   CALCIUM mg/dL 8.9 8.5* 8.9 9.3 9.5   PROTEIN TOTAL g/dL  --   --   --  7.0 7.9   BILIRUBIN TOTAL mg/dL  --   --   --  0.3 0.4   ALK PHOS U/L  --   --   --  105 110   ALT U/L  --   --   --  7 8   AST U/L  --   --   --  13 14   GLUCOSE mg/dL 239* 65* 42* 227* 330*     Results from last 7 days   Lab Units 01/29/25  0506 01/28/25  1615   BILIRUBIN TOTAL mg/dL 0.3 0.4           I have reviewed all medications, laboratory results, and imaging pertinent for today's encounter.

## 2025-02-02 NOTE — PROGRESS NOTES
ANTEPARTUM PROGRESS NOTE   2025, 5:13 AM     SUBJECTIVE:   Overnight pt was febrile with a temp of 38.3. Otherwise she does not report feeling hot, chills, or n/v. Reports continued pain at the I&D site. She tolerated her diet without issue last night. Denies vb, lof, or cramping.    OBJECTIVE:   BP (!) 151/83 Comment: pt stated she had just been coughing  Pulse 75   Temp 37 °C (98.6 °F) (Temporal)   Resp 18   Ht 1.524 m (5')   Wt 91.2 kg (201 lb)   SpO2 100%   BMI 39.26 kg/m²    Temp  Min: 36 °C (96.8 °F)  Max: 38.3 °C (100.9 °F)  Pulse  Min: 64  Max: 92  BP  Min: 106/62  Max: 158/90    General:  AAOx3, No acute distress  Cardiovascular: Warm and well perfused  Respiratory: Normal respiratory effort   Abdominal:  Soft, gravid, non-tender  : bandage over R I&D site in place. Appears dry and intact.  Extremities: Warm, well perfused      ASSESSMENT AND PLAN:     25 yo  at 6w0d by first trimester US with PMH uncontrolled pre gestational diabetes with CKD and cHTN presenting from office for glycemic optimization and evaluation of left gluteal cleft abscess.      Uncontrolled pre-gestational diabetes   - Most recent A1C 16.9% in 2024, patient reports no insulin use for over a year.   - Hyperglycemic with , slightly elevated BHB to 0.37 on admission. No evidence of DKA  - Repeat A1C 15.0 -drawn on admission  - Prior regimen (per chart review, pt not taking):  Lantus 32 units, Humalog 12 units before meals and Metformin 1000 mg AM  -Current Regimen: 6u lispro with meals and 10u lantus nightly, SSI for postprandial with dinner --> regimen to be reviewed with plans for continued adjustments today      Febrile  Left Gluteal Cleft Abscess   - CT pelvis notable for marked subcutaneous soft tissue stranding/skin thickening extending to the perineal region with mildly enlarged left inguinal lymph nodes with c/f an anal fistula tract   - s/p bedside I&D  with ACS, significant amount of  purulent drainage. Continuing to drain.   - s/p OR I&D on 2/1 with gen surg  - Urine cx collected w/ contamination. Plan to repeat if clinically indicated.   -Wound cx 2/1 w/ prelim gram + cocci. Cont to fu  - Vanc/zosyn (1/29-2/1), Augmentin (2/1)  - Pt with fever overnight to 38.3. Just recently transitioned from vanc/zosyn to po Augmentin yesterday.   -Held Augmentin overnight and switched back to vanc/zosyn for the time being. Will reach out to ACS today for further recs  -Collected repeat labs CBC & BMP overnight. WBC stable.     cHTN  - on chart review, not on meds  - baseline HELLP labs wnl, P:C 0.47  -2 MRBPs overnight noted that appear erroneous-one taken as pt had just finished coughing. Will cont to monitor     Routine prenatal care   - TVUS on 1/28 notable for GS with MSD of 7 mm w/o fetal pole. For repeat TVUS in 2 weeks   - PNLs ordered wnl     Dispo: pending improved glycemic control, improvement in gluteal abscess     To be seen and d/w Dr. Lacho Duarte,   Joan Landon MD  OBGYN, PGY-1

## 2025-02-02 NOTE — CONSULTS
Vancomycin Dosing by Pharmacy- INITIAL    Xavier Martinez Delacruz is a 24 y.o. year old female who Pharmacy has been consulted for vancomycin dosing for cellulitis, skin and soft tissue. Based on the patient's indication and renal status this patient will be dosed based on a goal AUC of 400-600.     Renal function is currently stable.    Visit Vitals  /74 (BP Location: Left arm, Patient Position: Lying)   Pulse 86   Temp (!) 38.3 °C (100.9 °F) (Temporal)   Resp 18        Lab Results   Component Value Date    CREATININE 0.46 (L) 2025    CREATININE 0.53 2025    CREATININE 0.47 (L) 2025    CREATININE 0.60 2025        Patient weight is as follows:   Vitals:    25 1545   Weight: 91.2 kg (201 lb)       Cultures:  No results found for the encounter in last 14 days.        I/O last 3 completed shifts:  In: 75 (0.8 mL/kg) [I.V.:75 (0.8 mL/kg)]  Out: 705 (7.7 mL/kg) [Urine:700 (0.2 mL/kg/hr); Blood:5]  Weight: 91.2 kg   I/O during current shift:  No intake/output data recorded.    Temp (24hrs), Av.6 °C (97.8 °F), Min:36 °C (96.8 °F), Max:38.3 °C (100.9 °F)         Assessment/Plan     Patient will not be given a loading dose.  Will initiate vancomycin maintenance,  1500 mg every 12 hours.    This dosing regimen is predicted by [employer] to result in the following pharmacokinetic parameters:  Regimen: 1500 mg IV every 12 hours.  Start time: 01:05 on 2025  Exposure target: AUC24 (range)400-600 mg/L.hr   [id number]: 497 mg/L.hr  AUC24,ss: 495 mg/L.hr  Probability of AUC24 > 400: 91 %  Ctrough,ss: 14.3 mg/L  Probability of Ctrough,ss > 20: 12 %    Follow-up level will be ordered on 25 at 1000 unless clinically indicated sooner.  Will continue to monitor renal function daily while on vancomycin and order serum creatinine at least every 48 hours if not already ordered.  Follow for continued vancomycin needs, clinical response, and signs/symptoms of toxicity.       Yogi Espinoza,  PharmD

## 2025-02-02 NOTE — NURSING NOTE
Patient did not tolerate her dressing change well.Lots of reassurance required.Her boyfriend wants to do her night time dressing.No fevers today.Po antibiotics restarted.

## 2025-02-02 NOTE — CARE PLAN
The patient's goals for the shift include better blood sugars    The clinical goals for the shift include Blood glucose controled through out shift.    Over the shift, the patient did not make progress toward the following goals. Barriers to progression include fluctuating blood sugars. Recommendations to address these barriers include eating meals and adjusting blood sugars as needed.Watching vital signs for fevers or other signs of infection.

## 2025-02-03 ENCOUNTER — PHARMACY VISIT (OUTPATIENT)
Dept: PHARMACY | Facility: CLINIC | Age: 25
End: 2025-02-03
Payer: COMMERCIAL

## 2025-02-03 VITALS
OXYGEN SATURATION: 98 % | HEIGHT: 60 IN | SYSTOLIC BLOOD PRESSURE: 100 MMHG | BODY MASS INDEX: 39.46 KG/M2 | HEART RATE: 71 BPM | DIASTOLIC BLOOD PRESSURE: 64 MMHG | TEMPERATURE: 98.1 F | WEIGHT: 201 LBS | RESPIRATION RATE: 18 BRPM

## 2025-02-03 PROBLEM — L02.31 GLUTEAL ABSCESS: Status: ACTIVE | Noted: 2025-01-28

## 2025-02-03 PROBLEM — B35.1 FUNGAL NAIL INFECTION: Status: RESOLVED | Noted: 2023-08-26 | Resolved: 2025-02-03

## 2025-02-03 PROBLEM — O09.899 RUBELLA NON-IMMUNE STATUS, ANTEPARTUM (HHS-HCC): Status: ACTIVE | Noted: 2025-02-03

## 2025-02-03 PROBLEM — L85.3 XEROSIS OF SKIN: Status: RESOLVED | Noted: 2023-08-26 | Resolved: 2025-02-03

## 2025-02-03 PROBLEM — M79.674 CHRONIC PAIN OF TOE OF RIGHT FOOT: Status: RESOLVED | Noted: 2023-08-26 | Resolved: 2025-02-03

## 2025-02-03 PROBLEM — G89.29 CHRONIC PAIN OF TOE OF LEFT FOOT: Status: RESOLVED | Noted: 2023-08-26 | Resolved: 2025-02-03

## 2025-02-03 PROBLEM — Z28.39 RUBELLA NON-IMMUNE STATUS, ANTEPARTUM (HHS-HCC): Status: ACTIVE | Noted: 2025-02-03

## 2025-02-03 PROBLEM — O98.811 CHLAMYDIA INFECTION AFFECTING PREGNANCY IN FIRST TRIMESTER (HHS-HCC): Status: ACTIVE | Noted: 2025-02-03

## 2025-02-03 PROBLEM — A74.9 CHLAMYDIA INFECTION AFFECTING PREGNANCY IN FIRST TRIMESTER (HHS-HCC): Status: ACTIVE | Noted: 2025-02-03

## 2025-02-03 PROBLEM — O24.111 TYPE 2 DIABETES MELLITUS AFFECTING PREGNANCY IN FIRST TRIMESTER, ANTEPARTUM (HHS-HCC): Status: RESOLVED | Noted: 2025-01-29 | Resolved: 2025-02-03

## 2025-02-03 PROBLEM — O99.810: Status: RESOLVED | Noted: 2025-01-29 | Resolved: 2025-02-03

## 2025-02-03 PROBLEM — M79.675 CHRONIC PAIN OF TOE OF LEFT FOOT: Status: RESOLVED | Noted: 2023-08-26 | Resolved: 2025-02-03

## 2025-02-03 PROBLEM — G89.29 CHRONIC PAIN OF TOE OF RIGHT FOOT: Status: RESOLVED | Noted: 2023-08-26 | Resolved: 2025-02-03

## 2025-02-03 LAB
BACTERIA SPEC CULT: ABNORMAL
BACTERIA SPEC CULT: ABNORMAL
FLUAV RNA RESP QL NAA+PROBE: DETECTED
FLUBV RNA RESP QL NAA+PROBE: NOT DETECTED
GLUCOSE BLD MANUAL STRIP-MCNC: 103 MG/DL (ref 74–99)
GLUCOSE BLD MANUAL STRIP-MCNC: 203 MG/DL (ref 74–99)
GLUCOSE BLD MANUAL STRIP-MCNC: 215 MG/DL (ref 74–99)
GLUCOSE BLD MANUAL STRIP-MCNC: 218 MG/DL (ref 74–99)
GLUCOSE BLD MANUAL STRIP-MCNC: 292 MG/DL (ref 74–99)
GLUCOSE BLD MANUAL STRIP-MCNC: 59 MG/DL (ref 74–99)
GRAM STN SPEC: ABNORMAL
SARS-COV-2 RNA RESP QL NAA+PROBE: NOT DETECTED

## 2025-02-03 PROCEDURE — 2500000004 HC RX 250 GENERAL PHARMACY W/ HCPCS (ALT 636 FOR OP/ED)

## 2025-02-03 PROCEDURE — 2500000001 HC RX 250 WO HCPCS SELF ADMINISTERED DRUGS (ALT 637 FOR MEDICARE OP)

## 2025-02-03 PROCEDURE — 99238 HOSP IP/OBS DSCHRG MGMT 30/<: CPT

## 2025-02-03 PROCEDURE — 87636 SARSCOV2 & INF A&B AMP PRB: CPT

## 2025-02-03 PROCEDURE — 2500000002 HC RX 250 W HCPCS SELF ADMINISTERED DRUGS (ALT 637 FOR MEDICARE OP, ALT 636 FOR OP/ED)

## 2025-02-03 PROCEDURE — RXMED WILLOW AMBULATORY MEDICATION CHARGE

## 2025-02-03 PROCEDURE — 82947 ASSAY GLUCOSE BLOOD QUANT: CPT

## 2025-02-03 RX ORDER — INSULIN LISPRO 100 [IU]/ML
0-5 INJECTION, SOLUTION INTRAVENOUS; SUBCUTANEOUS
Status: COMPLETED | OUTPATIENT
Start: 2025-02-03 | End: 2025-02-03

## 2025-02-03 RX ORDER — FLUCONAZOLE 150 MG/1
150 TABLET ORAL ONCE
Status: DISCONTINUED | OUTPATIENT
Start: 2025-02-03 | End: 2025-02-03 | Stop reason: HOSPADM

## 2025-02-03 RX ORDER — IBUPROFEN 200 MG
CAPSULE ORAL
Qty: 200 EACH | Refills: 3 | Status: SHIPPED | OUTPATIENT
Start: 2025-02-03

## 2025-02-03 RX ORDER — DOCUSATE SODIUM 100 MG/1
100 CAPSULE, LIQUID FILLED ORAL 2 TIMES DAILY PRN
Qty: 30 CAPSULE | Refills: 5 | Status: SHIPPED | OUTPATIENT
Start: 2025-02-03

## 2025-02-03 RX ORDER — INSULIN GLARGINE 100 [IU]/ML
10 INJECTION, SOLUTION SUBCUTANEOUS NIGHTLY
Status: DISCONTINUED | OUTPATIENT
Start: 2025-02-03 | End: 2025-02-03 | Stop reason: HOSPADM

## 2025-02-03 RX ORDER — ISOPROPYL ALCOHOL 70 ML/100ML
SWAB TOPICAL
Qty: 200 EACH | Refills: 3 | Status: SHIPPED | OUTPATIENT
Start: 2025-02-03

## 2025-02-03 RX ORDER — GLUCAGON 1 MG
VIAL (EA) INJECTION
Qty: 2 EACH | Refills: 3 | Status: SHIPPED | OUTPATIENT
Start: 2025-02-03

## 2025-02-03 RX ORDER — INSULIN GLARGINE 100 [IU]/ML
14 INJECTION, SOLUTION SUBCUTANEOUS NIGHTLY
Status: DISCONTINUED | OUTPATIENT
Start: 2025-02-03 | End: 2025-02-03

## 2025-02-03 RX ORDER — LANCETS 33 GAUGE
EACH MISCELLANEOUS
Qty: 200 EACH | Refills: 3 | Status: SHIPPED | OUTPATIENT
Start: 2025-02-03

## 2025-02-03 RX ORDER — PEN NEEDLE, DIABETIC 30 GX3/16"
NEEDLE, DISPOSABLE MISCELLANEOUS
Qty: 200 EACH | Refills: 3 | Status: SHIPPED | OUTPATIENT
Start: 2025-02-03

## 2025-02-03 RX ORDER — BLOOD-GLUCOSE SENSOR
EACH MISCELLANEOUS
Qty: 5 EACH | Refills: 11 | Status: SHIPPED | OUTPATIENT
Start: 2025-02-03

## 2025-02-03 RX ORDER — INSULIN LISPRO 100 [IU]/ML
0-5 INJECTION, SOLUTION INTRAVENOUS; SUBCUTANEOUS
Status: DISCONTINUED | OUTPATIENT
Start: 2025-02-03 | End: 2025-02-03

## 2025-02-03 RX ORDER — METRONIDAZOLE 500 MG/1
500 TABLET ORAL 2 TIMES DAILY
Status: DISCONTINUED | OUTPATIENT
Start: 2025-02-03 | End: 2025-02-03 | Stop reason: HOSPADM

## 2025-02-03 RX ORDER — FLUCONAZOLE 150 MG/1
150 TABLET ORAL ONCE
Status: COMPLETED | OUTPATIENT
Start: 2025-02-03 | End: 2025-02-03

## 2025-02-03 RX ORDER — INSULIN LISPRO 100 [IU]/ML
INJECTION, SOLUTION INTRAVENOUS; SUBCUTANEOUS
Qty: 9 ML | Refills: 3 | Status: SHIPPED | OUTPATIENT
Start: 2025-02-03

## 2025-02-03 RX ORDER — OSELTAMIVIR PHOSPHATE 75 MG/1
75 CAPSULE ORAL 2 TIMES DAILY
Qty: 9 CAPSULE | Refills: 0 | Status: SHIPPED | OUTPATIENT
Start: 2025-02-03 | End: 2025-02-03

## 2025-02-03 RX ORDER — AMOXICILLIN AND CLAVULANATE POTASSIUM 875; 125 MG/1; MG/1
1 TABLET, FILM COATED ORAL EVERY 12 HOURS SCHEDULED
Qty: 10 TABLET | Refills: 0 | Status: SHIPPED | OUTPATIENT
Start: 2025-02-03 | End: 2025-02-08

## 2025-02-03 RX ORDER — OSELTAMIVIR PHOSPHATE 75 MG/1
75 CAPSULE ORAL 2 TIMES DAILY
Qty: 10 CAPSULE | Refills: 0 | Status: SHIPPED | OUTPATIENT
Start: 2025-02-03 | End: 2025-02-08

## 2025-02-03 RX ORDER — INSULIN GLARGINE 100 [IU]/ML
INJECTION, SOLUTION SUBCUTANEOUS
Qty: 15 ML | Refills: 3 | Status: SHIPPED | OUTPATIENT
Start: 2025-02-03

## 2025-02-03 RX ORDER — METRONIDAZOLE 500 MG/1
500 TABLET ORAL 2 TIMES DAILY
Qty: 13 TABLET | Refills: 0 | Status: SHIPPED | OUTPATIENT
Start: 2025-02-03

## 2025-02-03 RX ORDER — OSELTAMIVIR PHOSPHATE 75 MG/1
75 CAPSULE ORAL 2 TIMES DAILY
Status: DISCONTINUED | OUTPATIENT
Start: 2025-02-03 | End: 2025-02-03 | Stop reason: HOSPADM

## 2025-02-03 RX ADMIN — OSELTAMIVIR PHOSPHATE 75 MG: 75 CAPSULE ORAL at 19:06

## 2025-02-03 RX ADMIN — OSELTAMIVIR PHOSPHATE 75 MG: 75 CAPSULE ORAL at 08:54

## 2025-02-03 RX ADMIN — INSULIN LISPRO 3 UNITS: 100 INJECTION, SOLUTION INTRAVENOUS; SUBCUTANEOUS at 11:57

## 2025-02-03 RX ADMIN — METRONIDAZOLE 500 MG: 500 TABLET ORAL at 12:35

## 2025-02-03 RX ADMIN — ACETAMINOPHEN 975 MG: 325 TABLET ORAL at 00:33

## 2025-02-03 RX ADMIN — AMOXICILLIN AND CLAVULANATE POTASSIUM 1 TABLET: 875; 125 TABLET, FILM COATED ORAL at 08:54

## 2025-02-03 RX ADMIN — FLUCONAZOLE 150 MG: 150 TABLET ORAL at 16:00

## 2025-02-03 RX ADMIN — INSULIN LISPRO 6 UNITS: 100 INJECTION, SOLUTION INTRAVENOUS; SUBCUTANEOUS at 10:08

## 2025-02-03 RX ADMIN — INSULIN LISPRO 6 UNITS: 100 INJECTION, SOLUTION INTRAVENOUS; SUBCUTANEOUS at 16:25

## 2025-02-03 RX ADMIN — INSULIN LISPRO 2 UNITS: 100 INJECTION, SOLUTION INTRAVENOUS; SUBCUTANEOUS at 10:09

## 2025-02-03 RX ADMIN — HYDROMORPHONE HYDROCHLORIDE 0.2 MG: 0.2 INJECTION, SOLUTION INTRAMUSCULAR; INTRAVENOUS; SUBCUTANEOUS at 00:33

## 2025-02-03 RX ADMIN — LORAZEPAM 1 MG: 2 INJECTION INTRAMUSCULAR; INTRAVENOUS at 00:33

## 2025-02-03 RX ADMIN — PRENATAL VIT W/ FE FUMARATE-FA TAB 27-0.8 MG 1 TABLET: 27-0.8 TAB at 08:53

## 2025-02-03 ASSESSMENT — PAIN - FUNCTIONAL ASSESSMENT
PAIN_FUNCTIONAL_ASSESSMENT: 0-10

## 2025-02-03 ASSESSMENT — PAIN DESCRIPTION - LOCATION: LOCATION: BUTTOCKS

## 2025-02-03 ASSESSMENT — PAIN SCALES - GENERAL
PAINLEVEL_OUTOF10: 0 - NO PAIN

## 2025-02-03 NOTE — PROGRESS NOTES
ANTEPARTUM PROGRESS NOTE   2/3/2025, 7:30 AM     SUBJECTIVE:   Unsure of the time when she ate dinner but reports that that was the last time she ate. Denies any snacks in the middle of the night. Reports improvement in her nasal congestion.     OBJECTIVE:   /65   Pulse 76   Temp 36.8 °C (98.2 °F) (Temporal)   Resp 18   Ht 1.524 m (5')   Wt 91.2 kg (201 lb)   SpO2 98%   BMI 39.26 kg/m²    Temp  Min: 36.8 °C (98.2 °F)  Max: 37.3 °C (99.1 °F)  Pulse  Min: 64  Max: 76  BP  Min: 103/65  Max: 136/83    General: No acute distress  Cardiovascular: Warm and well perfused  Respiratory: Normal respiratory effort on RA  Abdominal: Soft, non tender   : gluteal wound packed with kerlix, no surrounding erythema      ASSESSMENT AND PLAN:     23 yo  at 6w2d by first trimester US with PMH uncontrolled pre gestational diabetes with CKD and cHTN presenting from office for glycemic optimization and evaluation of left gluteal cleft abscess.      Uncontrolled pre-gestational diabetes   - No insulin use for over a year. A1C on admission 15%  - Hyperglycemic with , slightly elevated BHB to 0.37 on admission. No evidence of DKA  - Current Regimen: 10u lantus nightly, 6u lispro with meals with pre prandial SSI. Planning to increase Lantus 14u nightly. No changes to Lispro at this time.      Left Gluteal Cleft Abscess   - CT pelvis notable for marked subcutaneous soft tissue stranding/skin thickening extending to the perineal region with mildly enlarged left inguinal lymph nodes with c/f an anal fistula tract   - s/p bedside I&D  with ACS followed by OR I&D on  with ACS. Planning for one week follow up with ACS upon discharge   - Urine cx collected w/ contamination. Plan to repeat if clinically indicated.   - Previously on Vanc/zosyn (-), now transitioned to Augmentin (-). Briefly transitioned back to IV Vanc/Zosyn for fever on evening of . Currently remaining afebrile on PO Augmentin   - Wound  culture growing Staph Aureus, sensitivities pending     cHTN  - baseline HELLP labs wnl, P:C 0.47  - Rare mild range blood pressures during admission, but mostly asymptomatic therefore no indication for antihypertensives at this time     Influenza A   - Reporting flu-like symptoms since 1/30, tested positive on 2/3   - Tamiflu BID x5 days ordered (2/3-2/7)     Routine prenatal care   - TVUS on 1/28 notable for GS with MSD of 7 mm w/o fetal pole. For repeat TVUS in 2 weeks   - PNLs notable for Chlamydia infection, s/p Azithromycin, will need DALILA. Also notable for rubella non immunity. For PP MMR     Dispo: pending improved glycemic control     To be seen and d/w Dr. Lacho Duarte,   Bunny Moctezuma MD, PGY-3  MFM

## 2025-02-03 NOTE — DISCHARGE SUMMARY
Discharge Summary    Admission Date: 1/28/2025  Discharge Date: 2/3/2025    Discharge Diagnosis  Type 2 diabetes mellitus affecting pregnancy in first trimester, antepartum ([facility])    Hospital Course  On 1/28, the patient was admitted for glycemic optimization given uncontrolled pre gestational diabetes and concern for a unilateral gluteal abscess. Upon presentation to ED, BHB found to be slightly elevated but other labs w/o evidence of DKA. CT A/P obtained and notable for marked subcutaneous soft tissue stranding/skin thickening extending to the perineal region with mildly enlarged left inguinal lymph nodes with c/f an anal fistula tract. ACS was consulted who recommended starting IV Vanc/Zosyn and I&D. Patient s/p bedside I&D on 1/29 followed by OR I&D on 2/1. S/p wound care consult on 2/3 who recommended BID dressing changes with Vashe wound cleanser for discharge home. Patient was also started on Lantus 20 units AM/32 units PM with Lantus 12 units with meal. Insulin regimen eventually scaled back significantly in setting of hypoglycemia. At the time of discharge, she was discharged on Lispro 6 units prior to meals and Lantus 10 units at bedtime. For the gluteal abscess, she was discharged on PO Augmentin to be continued until 2/7. Planning for OB fuv and repeat TVUS on 2/11. ACS fuv scheduled for 2/18.     Of note, new OB labs on admission notable for Chlamydia. S/p one time dose of Azithromycin. Vaginitis swab collected in Diabetes clinic found to be positive for yeast and bacterial vaginitis therefore she was given a one time dose of Diflucan and started on a 5 day course of Flagyl. Also found to be positive for Influenza A, therefore discharged with 5 day course of Tamiflu.     Pertinent Physical Exam At Time of Discharge  General: No acute distress  Cardiovascular: Warm and well perfused  Respiratory: Normal respiratory effort on RA  Abdominal: Soft, non tender   : gluteal wound packed with kerlix, no  "surrounding erythema      Last Vitals:  Temp Pulse Resp BP MAP Pulse Ox   36.7 °C (98.1 °F) 71 18 100/64 76 98 %     Discharge Meds     Your medication list        START taking these medications        Instructions Last Dose Given Next Dose Due   amoxicillin-pot clavulanate 875-125 mg tablet  Commonly known as: Augmentin      Take 1 tablet by mouth every 12 hours for 10 doses.       blood pressure test kit-large kit      1 each once daily.       glucagon 1 mg/0.2 mL auto-injector      Inject 1 mg into the muscle every 15 minutes if needed (severely low blood sugars).       Glucagon Emergency Kit (human) 1 mg injection  Generic drug: glucagon      Use for treatment of severe hypoglycemia       metroNIDAZOLE 500 mg tablet  Commonly known as: Flagyl      Take 1 tablet (500 mg) by mouth 2 times a day.       oseltamivir 75 mg capsule  Commonly known as: Tamiflu      Take 1 capsule (75 mg) by mouth 2 times a day.       TRUEplus Lancets 33 gauge misc  Generic drug: lancets      Use 1 lancet 4-6 times per day during pregnancy              CHANGE how you take these medications        Instructions Last Dose Given Next Dose Due   Easy Touch Alcohol Prep Pads pads, medicated  Generic drug: alcohol swabs  What changed: additional instructions      Use 1, up to 5 times a day       Lantus Solostar U-100 Insulin 100 unit/mL (3 mL) pen  Generic drug: insulin glargine  What changed: additional instructions      Inject 10 units subcutaneously at bedtime. May increase to 50 units total per day during pregnancy as needed.       insulin lispro 100 unit/mL injection  Commonly known as: HumaLOG KwikPen Insulin  What changed:   how much to take  how to take this  when to take this  additional instructions      Inject 6 units subcutaneously prior to meals. May increase to 30 units total per day during pregnancy as needed.       BD Merna 2nd Gen Pen Needle 32 gauge x 5/32\" needle  Generic drug: pen needle, diabetic  What changed: additional " "instructions      Use 1 per injection, up to 5 times a day       FreeStyle Lite Strips strip  Generic drug: blood sugar diagnostic  What changed: additional instructions      Use 1 strip 4-6 times a day during pregnancy              CONTINUE taking these medications        Instructions Last Dose Given Next Dose Due   FreeStyle Leobardo 3 Sensor device  Generic drug: blood-glucose sensor      Use asn directed to measure glucose in real time changing every 14 days              STOP taking these medications      acetaminophen 325 mg tablet  Commonly known as: TylenoL        acetaminophen-codeine 300-30 mg tablet  Commonly known as: Tylenol w/ Codeine #3        ammonium lactate 12 % lotion  Commonly known as: Lac-Hydrin        drospirenone (contraceptive) 4 mg (28) tablet        FreeStyle Leobardo 2 [place] misc  Generic drug: flash glucose scanning reader        FreeStyle Leobardo 2 Sensor kit  Generic drug: flash glucose sensor kit        lisinopril 20 mg tablet        metFORMIN 1,000 mg tablet  Commonly known as: Glucophage        valACYclovir 1 gram tablet  Commonly known as: Valtrex                  Where to Get Your Medications        These medications were sent to [id number] [place] [address]      Phone: [phone]   blood pressure test kit-large kit  Glucagon Emergency Kit (human) 1 mg injection       These medications were sent to [facility]  [id number] [place] [address], [address] [address]      Hours: 8AM to 6PM Mon-Fri, 8AM to 4PM Sat, 9AM to 1PM Sun Phone: [phone]   amoxicillin-pot clavulanate 875-125 mg tablet  BD Merna 2nd Gen Pen Needle 32 gauge x 5/32\" needle  Easy Touch Alcohol Prep Pads pads, medicated  FreeStyle Leobardo 3 Sensor device  FreeStyle Lite Strips strip  glucagon 1 mg/0.2 mL auto-injector  insulin lispro 100 unit/mL injection  Lantus Solostar U-100 Insulin 100 unit/mL (3 mL) pen  metroNIDAZOLE 500 mg " tablet  oseltamivir 75 mg capsule  TRUEplus Lancets 33 gauge misc          Complications Requiring Follow-Up  Gluteal abscess   Glycemic control     Test Results Pending At Discharge  Pending Labs       Order Current Status    Fungal Culture/Smear Preliminary result    Fungal Culture/Smear Preliminary result            Outpatient Follow-Up  Future Appointments   Date Time Provider Department Center   2/18/2025 12:30 PM GENSURG [id number] [facility] [id number] [facility]   5/12/2025  2:15 PM Polly Carreno, APRN-CNP [id number] [facility]         Seen and d/w Dr. Heena Moctezuma MD, PGY-3

## 2025-02-03 NOTE — DISCHARGE INSTRUCTIONS
[facility] General Surgery Follow Up  Date 2/18/2024 at 12:30 pm  Address: [facility]  [address]  [facility] [address]  [facility] [phone]    Recommendations for wound care:  Cleanse and repack L gluteal wound BID (2x/day) using small section of kerlix roll lightly moistened with Vashe wound cleanser (oracle #[id number]). Cover with folded 4x4 gauze and secure with paper tape. You can and should shower with antibacterial soap.

## 2025-02-03 NOTE — CONSULTS
Wound Care Consult     Visit Date: 2/3/2025      Patient Name: Karen Delacruz         MRN: [id number]           YOB: 2000     Reason for Consult: L buttock wound     Wound History: L gluteal cleft abscess s/p bedside I&D 1/29 with ACS followed by OR I&D on 2/1 with ACS; pt with untreated/poorly-controlled DM1    Wound Assessment:  Wound 01/29/25 Buttock Left;Medial (Active)   Wound Image   02/03/25 1338   Shape 5xp gauze pad with underlying packed kerlex, mesh panties 02/01/25 0921   Wound Length (cm) 4 cm 02/03/25 1338   Wound Width (cm) 2 cm 02/03/25 1338   Wound Surface Area (cm^2) 8 cm^2 02/03/25 1338   Wound Depth (cm) 0.5 cm 02/03/25 1338   Wound Volume (cm^3) 4 cm^3 02/03/25 1338   State of Healing Early/partial granulation 02/03/25 1338   Margins Undefined edges 02/02/25 0900   Drainage Description Serosanguineous 02/03/25 1338   Drainage Amount Small 02/03/25 1338   Dressing Packed;Moist to dry;Gauze 02/03/25 1338   Dressing Changed New 02/03/25 1338   Dressing Status Clean;Dry 02/03/25 1338     Assessment/Intervention: Pt seen resting L-side lying in bed. Alert and oriented. No visitors present. Pt declined pain and anxiety meds prior to visit, stating she wants to perform dressing change herself to reduce anxiety. Pt donned gloves and removed old packing. Wound appears clean and red with no odor or purulence on old packing material. Wound measures 4cm x 2.5cm x 0.5cm, but pt could not tolerate probing for undermining or tunneling (despite educating her on the importance of knowing wound's complete dimensions). Writer cleaned wound and periwound skin and moistened a strip of kerlix roll with Vashe wound cleanser. Pt attempted to pack wound with writer's guidance. Once reasonably packed, writer covered the packing with dry, folded 4x4 and paper tape. Instructed pt that she will likely need to use a mirror, cell phone camera, or other visual assistive device at home.    Pt provided with  full bottle of Vashe wound cleanser to take home, as well as literature on its use and where to purchase more when she runs out. Also at bedside are NS, gauze rolls, 4x4s and cotton-tipped applicators. Discussed importance of using clean technique (soap & water, gloves) when performing wound care. Also explained that pt should shower and gently wash site using antibacterial soap and water, pt dry with clean towel and promptly repack wound. Pt should wear clean sanipanties or breathable, cotton underwear. Discussed importance of tight glycemic control and s/s of infection that should prompt her to seek medical attention. Discussed importance of finishing full course of ABX and continuing to pack wound until told otherwise (e.g., at her 1-week ACS follow up).      Wound Team Recs: Cleanse and repack L gluteal wound BID (2x/day) using small section of kerlix roll lightly moistened with Vashe wound cleanser (oracle #[id number], also left at bedside). Cover with folded 4x4 gauze and secure with paper tape. Pt can and should shower with antibacterial soap. Please provide wound care supplies for discharge.    Provider, please review.     Leyla Shah RN, CWON  Wound/Ostomy Nurse  2/3/2025  1:47 PM

## 2025-02-04 NOTE — CARE PLAN
The patient's goals for the shift include get rest    The clinical goals for the shift include Pain control during dressing change    Patient had elevated and low blood sugars this shift- Mds were made aware. Dressing change was completed by wound care this shift and the patient was educated on wound care by the wound care RN. Discharge orders placed. All orders, appointments and medications reviewed with the patient.    Gayla Chavez RN

## 2025-02-06 LAB
FUNGUS SPEC CULT: NORMAL
FUNGUS SPEC CULT: NORMAL
FUNGUS SPEC FUNGUS STN: NORMAL
FUNGUS SPEC FUNGUS STN: NORMAL

## 2025-02-11 ENCOUNTER — ROUTINE PRENATAL (OUTPATIENT)
Dept: MATERNAL FETAL MEDICINE | Facility: CLINIC | Age: 25
End: 2025-02-11
Payer: MEDICARE

## 2025-02-11 ENCOUNTER — PHARMACY VISIT (OUTPATIENT)
Dept: PHARMACY | Facility: CLINIC | Age: 25
End: 2025-02-11
Payer: COMMERCIAL

## 2025-02-11 ENCOUNTER — HOSPITAL ENCOUNTER (OUTPATIENT)
Dept: RADIOLOGY | Facility: CLINIC | Age: 25
Discharge: HOME | End: 2025-02-11
Payer: MEDICARE

## 2025-02-11 VITALS — SYSTOLIC BLOOD PRESSURE: 108 MMHG | BODY MASS INDEX: 39.18 KG/M2 | WEIGHT: 200.6 LBS | DIASTOLIC BLOOD PRESSURE: 74 MMHG

## 2025-02-11 DIAGNOSIS — A74.9 CHLAMYDIA INFECTION AFFECTING PREGNANCY IN FIRST TRIMESTER (HHS-HCC): ICD-10-CM

## 2025-02-11 DIAGNOSIS — O24.111 TYPE 2 DIABETES MELLITUS COMPLICATING PREGNANCY IN FIRST TRIMESTER, ANTEPARTUM (HHS-HCC): ICD-10-CM

## 2025-02-11 DIAGNOSIS — O09.899 RUBELLA NON-IMMUNE STATUS, ANTEPARTUM (HHS-HCC): ICD-10-CM

## 2025-02-11 DIAGNOSIS — N18.1 TYPE 2 DIABETES MELLITUS WITH STAGE 1 CHRONIC KIDNEY DISEASE, WITH LONG-TERM CURRENT USE OF INSULIN (MULTI): ICD-10-CM

## 2025-02-11 DIAGNOSIS — E11.22 TYPE 2 DIABETES MELLITUS WITH STAGE 1 CHRONIC KIDNEY DISEASE, WITH LONG-TERM CURRENT USE OF INSULIN (MULTI): ICD-10-CM

## 2025-02-11 DIAGNOSIS — L02.31 GLUTEAL ABSCESS: ICD-10-CM

## 2025-02-11 DIAGNOSIS — Z28.39 RUBELLA NON-IMMUNE STATUS, ANTEPARTUM (HHS-HCC): ICD-10-CM

## 2025-02-11 DIAGNOSIS — Z3A.01 LESS THAN 8 WEEKS GESTATION OF PREGNANCY (HHS-HCC): Primary | ICD-10-CM

## 2025-02-11 DIAGNOSIS — O36.80X0 PREGNANCY, LOCATION UNKNOWN (HHS-HCC): ICD-10-CM

## 2025-02-11 DIAGNOSIS — O10.919 CHRONIC HYPERTENSION IN PREGNANCY (HHS-HCC): ICD-10-CM

## 2025-02-11 DIAGNOSIS — O98.811 CHLAMYDIA INFECTION AFFECTING PREGNANCY IN FIRST TRIMESTER (HHS-HCC): ICD-10-CM

## 2025-02-11 DIAGNOSIS — Z79.4 TYPE 2 DIABETES MELLITUS WITH STAGE 1 CHRONIC KIDNEY DISEASE, WITH LONG-TERM CURRENT USE OF INSULIN (MULTI): ICD-10-CM

## 2025-02-11 PROBLEM — Z3A.08 8 WEEKS GESTATION OF PREGNANCY (HHS-HCC): Status: ACTIVE | Noted: 2025-02-11

## 2025-02-11 LAB
BILIRUBIN, POC: NEGATIVE
BLOOD URINE, POC: POSITIVE
CLARITY, POC: CLEAR
COLOR, POC: YELLOW
GLUCOSE BLD MANUAL STRIP-MCNC: 390 MG/DL (ref 74–99)
GLUCOSE URINE, POC: NORMAL
KETONES, POC: NEGATIVE
LEUKOCYTE EST, POC: NEGATIVE
NITRITE, POC: NEGATIVE
PH, POC: 6
POC APPEARANCE OF BODY FLUID: CLEAR
SPECIFIC GRAVITY, POC: 1.02
URINE PROTEIN, POC: NORMAL
UROBILINOGEN, POC: 0.2

## 2025-02-11 PROCEDURE — 76817 TRANSVAGINAL US OBSTETRIC: CPT | Performed by: STUDENT IN AN ORGANIZED HEALTH CARE EDUCATION/TRAINING PROGRAM

## 2025-02-11 PROCEDURE — 76801 OB US < 14 WKS SINGLE FETUS: CPT | Performed by: STUDENT IN AN ORGANIZED HEALTH CARE EDUCATION/TRAINING PROGRAM

## 2025-02-11 PROCEDURE — 76817 TRANSVAGINAL US OBSTETRIC: CPT

## 2025-02-11 PROCEDURE — 76801 OB US < 14 WKS SINGLE FETUS: CPT

## 2025-02-11 PROCEDURE — 81002 URINALYSIS NONAUTO W/O SCOPE: CPT | Performed by: OBSTETRICS & GYNECOLOGY

## 2025-02-11 PROCEDURE — RXMED WILLOW AMBULATORY MEDICATION CHARGE

## 2025-02-11 PROCEDURE — 82947 ASSAY GLUCOSE BLOOD QUANT: CPT

## 2025-02-11 PROCEDURE — 99215 OFFICE O/P EST HI 40 MIN: CPT | Mod: GC,25 | Performed by: OBSTETRICS & GYNECOLOGY

## 2025-02-11 ASSESSMENT — COLUMBIA-SUICIDE SEVERITY RATING SCALE - C-SSRS
1. IN THE PAST MONTH, HAVE YOU WISHED YOU WERE DEAD OR WISHED YOU COULD GO TO SLEEP AND NOT WAKE UP?: NO
6. HAVE YOU EVER DONE ANYTHING, STARTED TO DO ANYTHING, OR PREPARED TO DO ANYTHING TO END YOUR LIFE?: NO
2. HAVE YOU ACTUALLY HAD ANY THOUGHTS OF KILLING YOURSELF?: NO

## 2025-02-11 ASSESSMENT — ENCOUNTER SYMPTOMS
DEPRESSION: 0
OCCASIONAL FEELINGS OF UNSTEADINESS: 0
PSYCHIATRIC NEGATIVE: 0
ENDOCRINE NEGATIVE: 0
RESPIRATORY NEGATIVE: 0
CARDIOVASCULAR NEGATIVE: 0
HEMATOLOGIC/LYMPHATIC NEGATIVE: 0
NEUROLOGICAL NEGATIVE: 0
ALLERGIC/IMMUNOLOGIC NEGATIVE: 0
CONSTITUTIONAL NEGATIVE: 0
MUSCULOSKELETAL NEGATIVE: 0
GASTROINTESTINAL NEGATIVE: 0
LOSS OF SENSATION IN FEET: 0
EYES NEGATIVE: 0

## 2025-02-11 ASSESSMENT — PATIENT HEALTH QUESTIONNAIRE - PHQ9
SUM OF ALL RESPONSES TO PHQ9 QUESTIONS 1 AND 2: 0
2. FEELING DOWN, DEPRESSED OR HOPELESS: NOT AT ALL
1. LITTLE INTEREST OR PLEASURE IN DOING THINGS: NOT AT ALL

## 2025-02-11 NOTE — PROGRESS NOTES
"MFM Follow-up  25     SUBJECTIVE    HPI: Xavier Delacruz \"Chakari\" is a 24 y.o.  at 7w3d here for RPNV. Denies cramping and vaginal bleeding.    BG ranging from  within the last 7 weeks. Reports that 67 was a fasting BG this week. Reports knowing that she was low due to shaking. Has not been able to get refills on CGM sensors. States that the first sensor had a bent needle and then she used the second one. Has been doing finger sticks.12 units with meals with a sliding scale and 32 units at bedtime.  today in clinic. Last ate at 9 pm last night.     Has been changing gluteal wound dressing every other day. Denies fevers, chills, N/V, sweats.     Other pregnancy complications include:    Patient Active Problem List   Diagnosis    Myopia, bilateral    Food insecurity    Type 2 diabetes mellitus with stage 1 chronic kidney disease, with long-term current use of insulin (Multi)    Chronic hypertension in pregnancy ([facility])    Gluteal abscess    Chlamydia infection affecting pregnancy in first trimester ([facility])    Rubella non-immune status, antepartum ([facility])    Less than 8 weeks gestation of pregnancy ([facility])     OBJECTIVE  Visit Vitals  /74   Wt 91 kg (200 lb 9.6 oz)   BMI 39.18 kg/m²   OB Status Pregnant   Smoking Status Never   BSA 1.96 m²      FHT: US    ASSESSMENT & PLAN    Xavier Delacruz \"Karen\" is a 24 y.o.  at 7w3d here for the following concerns we addressed today:    Less than 8 weeks gestation of pregnancy ([facility])  Planning for repeat US in 2 weeks   PNVs sent to pharmacy     Type 2 diabetes mellitus with stage 1 chronic kidney disease, with long-term current use of insulin (Multi)  Current regimen: Lantus 32 units at bedtime, Lispro   New regimen as of today: Lantus 32 units at bedtime, Lispro     today in clinic. Udip w/o ketones therefore will defer sending to ED for workup for DKA   Encouraged to call Dexcom and report " defected sensor to receive free one until refill can be dispensed     Gluteal abscess  To be evaluated at next visit   Encouraged to notify clinic if having infectious symptoms        Orders Placed This Encounter   Procedures    Urinalysis with Reflex Microscopic     Standing Status:   Future     Number of Occurrences:   1     Standing Expiration Date:   2/11/2026     Order Specific Question:   Release result to MyChart     Answer:   Immediate [1]    POCT urinalysis dipstick manually resulted     Order Specific Question:   Release result to MyChart     Answer:   Immediate [1]        RTC in 1 week  Patient seen and evaluated with Dr. Feroz Moctezuma MD, PGY-3

## 2025-02-11 NOTE — ASSESSMENT & PLAN NOTE
Current regimen: Lantus 32 units at bedtime, Lispro 12/12/1  New regimen as of today: Lantus 32 units at bedtime, Lispro 16/16/16    today in clinic. Udip w/o ketones therefore will defer sending to ED for workup for DKA   Encouraged to call Dexcom and report defected sensor to receive free one until refill can be dispensed

## 2025-02-12 LAB
APPEARANCE UR: ABNORMAL
BACTERIA #/AREA URNS HPF: ABNORMAL /HPF
BILIRUB UR QL STRIP: NEGATIVE
COLOR UR: YELLOW
GLUCOSE UR QL STRIP: ABNORMAL
HGB UR QL STRIP: NEGATIVE
HYALINE CASTS #/AREA URNS LPF: ABNORMAL /LPF
KETONES UR QL STRIP: NEGATIVE
LEUKOCYTE ESTERASE UR QL STRIP: NEGATIVE
NITRITE UR QL STRIP: NEGATIVE
PH UR STRIP: 5.5 [PH] (ref 5–8)
PROT UR QL STRIP: ABNORMAL
RBC #/AREA URNS HPF: ABNORMAL /HPF
SERVICE CMNT-IMP: ABNORMAL
SP GR UR STRIP: 1.04 (ref 1–1.03)
SQUAMOUS #/AREA URNS HPF: ABNORMAL /HPF
WBC #/AREA URNS HPF: ABNORMAL /HPF
YEAST #/AREA URNS HPF: ABNORMAL /HPF

## 2025-02-18 ENCOUNTER — APPOINTMENT (OUTPATIENT)
Dept: SURGERY | Facility: CLINIC | Age: 25
End: 2025-02-18
Payer: MEDICARE

## 2025-02-19 ENCOUNTER — CLINICAL SUPPORT (OUTPATIENT)
Dept: SURGERY | Facility: CLINIC | Age: 25
End: 2025-02-19
Payer: MEDICARE

## 2025-02-19 VITALS
HEART RATE: 83 BPM | WEIGHT: 200 LBS | BODY MASS INDEX: 39.06 KG/M2 | DIASTOLIC BLOOD PRESSURE: 65 MMHG | RESPIRATION RATE: 20 BRPM | SYSTOLIC BLOOD PRESSURE: 98 MMHG

## 2025-02-19 DIAGNOSIS — Z51.89 VISIT FOR WOUND CHECK: Primary | ICD-10-CM

## 2025-02-19 PROCEDURE — 99213 OFFICE O/P EST LOW 20 MIN: CPT | Performed by: PHYSICIAN ASSISTANT

## 2025-02-19 ASSESSMENT — PAIN SCALES - GENERAL: PAINLEVEL_OUTOF10: 0-NO PAIN

## 2025-02-21 ENCOUNTER — APPOINTMENT (OUTPATIENT)
Dept: ENDOCRINOLOGY | Facility: CLINIC | Age: 25
End: 2025-02-21
Payer: MEDICARE

## 2025-02-25 ENCOUNTER — ROUTINE PRENATAL (OUTPATIENT)
Dept: MATERNAL FETAL MEDICINE | Facility: CLINIC | Age: 25
End: 2025-02-25
Payer: MEDICARE

## 2025-02-25 ENCOUNTER — HOSPITAL ENCOUNTER (OUTPATIENT)
Dept: RADIOLOGY | Facility: CLINIC | Age: 25
Discharge: HOME | End: 2025-02-25
Payer: MEDICARE

## 2025-02-25 ENCOUNTER — PHARMACY VISIT (OUTPATIENT)
Dept: PHARMACY | Facility: CLINIC | Age: 25
End: 2025-02-25
Payer: COMMERCIAL

## 2025-02-25 VITALS
SYSTOLIC BLOOD PRESSURE: 124 MMHG | WEIGHT: 211.1 LBS | HEART RATE: 101 BPM | DIASTOLIC BLOOD PRESSURE: 80 MMHG | BODY MASS INDEX: 41.23 KG/M2

## 2025-02-25 DIAGNOSIS — O36.80X0 PREGNANCY, LOCATION UNKNOWN (HHS-HCC): ICD-10-CM

## 2025-02-25 DIAGNOSIS — Z36.89 ESTABLISH GESTATIONAL AGE, ULTRASOUND: ICD-10-CM

## 2025-02-25 DIAGNOSIS — O09.812 PREGNANCY RESULTING FROM IN VITRO FERTILIZATION IN SECOND TRIMESTER (HHS-HCC): ICD-10-CM

## 2025-02-25 DIAGNOSIS — E10.22 TYPE 1 DIABETES MELLITUS WITH DIABETIC CHRONIC KIDNEY DISEASE, UNSPECIFIED CKD STAGE: ICD-10-CM

## 2025-02-25 DIAGNOSIS — O09.899 RUBELLA NON-IMMUNE STATUS, ANTEPARTUM (HHS-HCC): ICD-10-CM

## 2025-02-25 DIAGNOSIS — Z3A.08 8 WEEKS GESTATION OF PREGNANCY (HHS-HCC): ICD-10-CM

## 2025-02-25 DIAGNOSIS — E11.22 TYPE 2 DIABETES MELLITUS WITH STAGE 1 CHRONIC KIDNEY DISEASE, WITH LONG-TERM CURRENT USE OF INSULIN (MULTI): Primary | ICD-10-CM

## 2025-02-25 DIAGNOSIS — Z79.4 TYPE 2 DIABETES MELLITUS WITH STAGE 1 CHRONIC KIDNEY DISEASE, WITH LONG-TERM CURRENT USE OF INSULIN (MULTI): Primary | ICD-10-CM

## 2025-02-25 DIAGNOSIS — A74.9 CHLAMYDIA INFECTION AFFECTING PREGNANCY IN FIRST TRIMESTER (HHS-HCC): ICD-10-CM

## 2025-02-25 DIAGNOSIS — N18.1 TYPE 2 DIABETES MELLITUS WITH STAGE 1 CHRONIC KIDNEY DISEASE, WITH LONG-TERM CURRENT USE OF INSULIN (MULTI): Primary | ICD-10-CM

## 2025-02-25 DIAGNOSIS — O10.919 CHRONIC HYPERTENSION IN PREGNANCY (HHS-HCC): ICD-10-CM

## 2025-02-25 DIAGNOSIS — Z28.39 RUBELLA NON-IMMUNE STATUS, ANTEPARTUM (HHS-HCC): ICD-10-CM

## 2025-02-25 DIAGNOSIS — O98.811 CHLAMYDIA INFECTION AFFECTING PREGNANCY IN FIRST TRIMESTER (HHS-HCC): ICD-10-CM

## 2025-02-25 LAB — GLUCOSE BLD MANUAL STRIP-MCNC: 152 MG/DL (ref 74–99)

## 2025-02-25 PROCEDURE — 82947 ASSAY GLUCOSE BLOOD QUANT: CPT | Performed by: STUDENT IN AN ORGANIZED HEALTH CARE EDUCATION/TRAINING PROGRAM

## 2025-02-25 PROCEDURE — 99214 OFFICE O/P EST MOD 30 MIN: CPT | Mod: GC | Performed by: STUDENT IN AN ORGANIZED HEALTH CARE EDUCATION/TRAINING PROGRAM

## 2025-02-25 PROCEDURE — 99214 OFFICE O/P EST MOD 30 MIN: CPT | Performed by: STUDENT IN AN ORGANIZED HEALTH CARE EDUCATION/TRAINING PROGRAM

## 2025-02-25 PROCEDURE — 76816 OB US FOLLOW-UP PER FETUS: CPT

## 2025-02-25 PROCEDURE — 76816 OB US FOLLOW-UP PER FETUS: CPT | Performed by: OBSTETRICS & GYNECOLOGY

## 2025-02-25 PROCEDURE — RXMED WILLOW AMBULATORY MEDICATION CHARGE

## 2025-02-25 RX ORDER — BLOOD-GLUCOSE SENSOR
EACH MISCELLANEOUS
Qty: 2 EACH | Refills: 11 | Status: SHIPPED | OUTPATIENT
Start: 2025-02-25

## 2025-02-25 RX ORDER — ASPIRIN 81 MG/1
162 TABLET ORAL DAILY
Qty: 60 TABLET | Refills: 11 | Status: SHIPPED | OUTPATIENT
Start: 2025-02-25 | End: 2026-02-25

## 2025-02-25 RX ORDER — ACETAMINOPHEN 500 MG
TABLET ORAL
Qty: 1 EACH | Refills: 0 | Status: SHIPPED | OUTPATIENT
Start: 2025-02-25

## 2025-02-25 NOTE — ASSESSMENT & PLAN NOTE
US today with normal FHR and consistent 8w6d dating by CRL  NOB labs completed during inpatient stay   Orders:    aspirin 81 mg EC tablet; Take 2 tablets (162 mg) by mouth once daily.

## 2025-02-25 NOTE — ASSESSMENT & PLAN NOTE
Does not have BP log   Discussed increased risk of PEC during pregnancy and BP goals   Has not been on aspirin, to start today  Orders:    blood pressure test kit-large kit; Please take your blood pressure twice daily.    ECG 12 lead (Ancillary Performed); Future

## 2025-02-25 NOTE — PROGRESS NOTES
"[facility] Follow-up  25     SUBJECTIVE    HPI: Xavier Delacruz \"Chakari\" is a 24 y.o.  at 8w6d here for RPNV. Denies contractions, bleeding, or LOF. Reports normal fetal movement. Patient reports she has been taking her insulin every day before bed and with every meal and has been working on her carb counting.     Other pregnancy complications include    Patient Active Problem List   Diagnosis    Myopia, bilateral    Food insecurity    Type 2 diabetes mellitus with stage 1 chronic kidney disease, with long-term current use of insulin (Multi)    Chronic hypertension in pregnancy ([facility])    Gluteal abscess    Chlamydia infection affecting pregnancy in first trimester ([facility])    Rubella non-immune status, antepartum ([facility])    8 weeks gestation of pregnancy ([facility])       OBJECTIVE  Visit Vitals  /80   Pulse 101   Wt 95.8 kg (211 lb 1.6 oz)   BMI 41.23 kg/m²   OB Status Pregnant   Smoking Status Never   BSA 2.01 m²      FHT: US    ASSESSMENT & PLAN    Xavier Delacruz \"Edithkari\" is a 24 y.o.  at 9w3d here for the following concerns we addressed today:    Assessment & Plan  Chronic hypertension in pregnancy ([facility])  Does not have BP log   Discussed increased risk of PEC during pregnancy and BP goals   Has not been on aspirin, to start today  Orders:    blood pressure test kit-large kit; Please take your blood pressure twice daily.    ECG 12 lead (Ancillary Performed); Future    Chlamydia infection affecting pregnancy in first trimester ([facility])  DALILA today   Orders:    C. trachomatis / N. gonorrhoeae, Amplified, Urogenital; Future    8 weeks gestation of pregnancy ([facility])  US today with normal FHR and consistent 8w6d dating by CRL  NOB labs completed during inpatient stay   Orders:    aspirin 81 mg EC tablet; Take 2 tablets (162 mg) by mouth once daily.    Type 2 diabetes mellitus with stage 1 chronic kidney disease, with long-term current use of insulin (Multi)  16 plus sliding " scale; usually 2-3 units with meals and 32 nightly   40/20 meal plus sliding scale  Does not have BG log today     We discussed the pregnancy implications of the diagnosis of T2DM including increased risk of pre-eclampsia, LGA/macrosomia, shoulder dystocia, stillbirth as well as  hypoglycemia, hyperbilirubinemia and respiratory distress.  We discussed the increased risk of congenital malformations, particularly cardiac. We reviewed the importance of glycemic control and its impact on lowering these risks. Discussed our recommendation for serial growth ultrasounds and starting  testing at 32 weeks.  We also discussed starting a bASA for pre-eclampsia prevention which pt is amenable to at this time    We discussed the pregnancy implications of the diagnosis of T2DM including increased risk of pre-eclampsia, LGA/macrosomia, shoulder dystocia, stillbirth as well as  hypoglycemia, hyperbilirubinemia and respiratory distress.  We also discussed the risks of miscarriage and increased risk of congenital malformations associated with hyperglycemia in the 1st trimester.  Regarding congenital malformations, Cardiac defects are most commonly seen as well as renal, central nervous system, spinal and gastrointestinal anomalies and the risk is directly correlated with HgbA1c level.   We reviewed the importance of glycemic control and its impact on lowering these risks and that we recommend a HgbA1c <7.0 prior to conception.  We discussed management ranging from dietary changes to pharmacotherapy and that insulin is considered first line therapy rather than oral agents if medication is ultimately needed. Discussed our recommendation for serial growth ultrasounds and starting  testing at 32 weeks.  We also discussed starting a bASA for pre-eclampsia prevention.    Orders:    POCT GLUCOSE    Fetal Echo Complete; Future    ECG 12 lead (Ancillary Performed); Future      RTC in 1 week    Patient seen  and evaluated with Dr. Kelsea Bonilla MD PGY-1

## 2025-02-25 NOTE — ASSESSMENT & PLAN NOTE
16 plus sliding scale; usually 2-3 units with meals and 32 nightly   40/20 meal plus sliding scale  Does not have BG log today     We discussed the pregnancy implications of the diagnosis of T2DM including increased risk of pre-eclampsia, LGA/macrosomia, shoulder dystocia, stillbirth as well as  hypoglycemia, hyperbilirubinemia and respiratory distress.  We discussed the increased risk of congenital malformations, particularly cardiac. We reviewed the importance of glycemic control and its impact on lowering these risks. Discussed our recommendation for serial growth ultrasounds and starting  testing at 32 weeks.  We also discussed starting a bASA for pre-eclampsia prevention which pt is amenable to at this time    We discussed the pregnancy implications of the diagnosis of T2DM including increased risk of pre-eclampsia, LGA/macrosomia, shoulder dystocia, stillbirth as well as  hypoglycemia, hyperbilirubinemia and respiratory distress.  We also discussed the risks of miscarriage and increased risk of congenital malformations associated with hyperglycemia in the 1st trimester.  Regarding congenital malformations, Cardiac defects are most commonly seen as well as renal, central nervous system, spinal and gastrointestinal anomalies and the risk is directly correlated with HgbA1c level.   We reviewed the importance of glycemic control and its impact on lowering these risks and that we recommend a HgbA1c <7.0 prior to conception.  We discussed management ranging from dietary changes to pharmacotherapy and that insulin is considered first line therapy rather than oral agents if medication is ultimately needed. Discussed our recommendation for serial growth ultrasounds and starting  testing at 32 weeks.  We also discussed starting a bASA for pre-eclampsia prevention.    Orders:    POCT GLUCOSE    Fetal Echo Complete; Future    ECG 12 lead (Ancillary Performed); Future

## 2025-02-26 LAB
C TRACH RRNA SPEC QL NAA+PROBE: NOT DETECTED
N GONORRHOEA RRNA SPEC QL NAA+PROBE: NOT DETECTED
QUEST GC CT AMPLIFIED (ALWAYS MESSAGE): NORMAL

## 2025-02-27 ENCOUNTER — TELEPHONE (OUTPATIENT)
Dept: MATERNAL FETAL MEDICINE | Facility: HOSPITAL | Age: 25
End: 2025-02-27
Payer: MEDICARE

## 2025-02-27 ENCOUNTER — TELEPHONE (OUTPATIENT)
Dept: OBSTETRICS AND GYNECOLOGY | Facility: HOSPITAL | Age: 25
End: 2025-02-27
Payer: MEDICARE

## 2025-02-27 NOTE — TELEPHONE ENCOUNTER
Karen Delacruz has established care with [facility]  Diabetes in pregnancy program  Call to patient to establish Blood Sugar Support Line  communication  It appears a Continuous Glucose Monitor  was ordered but, unclear if she has initiated use    A voice mail message was left at the contact number provided by the patient.

## 2025-03-09 PROBLEM — Z3A.10 10 WEEKS GESTATION OF PREGNANCY (HHS-HCC): Status: ACTIVE | Noted: 2025-02-11

## 2025-03-11 ENCOUNTER — ROUTINE PRENATAL (OUTPATIENT)
Dept: MATERNAL FETAL MEDICINE | Facility: CLINIC | Age: 25
End: 2025-03-11
Payer: MEDICARE

## 2025-03-11 VITALS
HEART RATE: 88 BPM | WEIGHT: 223 LBS | DIASTOLIC BLOOD PRESSURE: 74 MMHG | SYSTOLIC BLOOD PRESSURE: 131 MMHG | BODY MASS INDEX: 43.55 KG/M2

## 2025-03-11 DIAGNOSIS — N18.1 TYPE 2 DIABETES MELLITUS WITH STAGE 1 CHRONIC KIDNEY DISEASE, WITH LONG-TERM CURRENT USE OF INSULIN (MULTI): ICD-10-CM

## 2025-03-11 DIAGNOSIS — E11.22 TYPE 2 DIABETES MELLITUS WITH STAGE 1 CHRONIC KIDNEY DISEASE, WITH LONG-TERM CURRENT USE OF INSULIN (MULTI): ICD-10-CM

## 2025-03-11 DIAGNOSIS — Z79.4 TYPE 2 DIABETES MELLITUS WITH STAGE 1 CHRONIC KIDNEY DISEASE, WITH LONG-TERM CURRENT USE OF INSULIN (MULTI): ICD-10-CM

## 2025-03-11 DIAGNOSIS — Z3A.10 10 WEEKS GESTATION OF PREGNANCY (HHS-HCC): Primary | ICD-10-CM

## 2025-03-11 DIAGNOSIS — O10.919 CHRONIC HYPERTENSION IN PREGNANCY (HHS-HCC): ICD-10-CM

## 2025-03-11 DIAGNOSIS — E10.22 TYPE 1 DIABETES MELLITUS WITH DIABETIC CHRONIC KIDNEY DISEASE, UNSPECIFIED CKD STAGE: ICD-10-CM

## 2025-03-11 DIAGNOSIS — O09.90 SUPERVISION OF HIGH RISK PREGNANCY, ANTEPARTUM (HHS-HCC): ICD-10-CM

## 2025-03-11 LAB — GLUCOSE BLD MANUAL STRIP-MCNC: 125 MG/DL (ref 74–99)

## 2025-03-11 PROCEDURE — 99214 OFFICE O/P EST MOD 30 MIN: CPT | Mod: GC | Performed by: OBSTETRICS & GYNECOLOGY

## 2025-03-11 PROCEDURE — 82947 ASSAY GLUCOSE BLOOD QUANT: CPT | Performed by: OBSTETRICS & GYNECOLOGY

## 2025-03-11 RX ORDER — BLOOD-GLUCOSE SENSOR
EACH MISCELLANEOUS
Qty: 2 EACH | Refills: 11 | Status: SHIPPED | OUTPATIENT
Start: 2025-03-11

## 2025-03-11 ASSESSMENT — PAIN SCALES - GENERAL: PAINLEVEL_OUTOF10: 0 - NO PAIN

## 2025-03-11 ASSESSMENT — PAIN - FUNCTIONAL ASSESSMENT: PAIN_FUNCTIONAL_ASSESSMENT: 0-10

## 2025-03-11 NOTE — ASSESSMENT & PLAN NOTE
No meds  Normotensive today and at home per pt report   Last P:C 0.47, repeat ordered today  Orders:    Protein, Urine Random; Future

## 2025-03-11 NOTE — ASSESSMENT & PLAN NOTE
Discussed cfdna with pt, pt would like to get  NT US on 3/21  Otherwise UTD    Orders:    [facility] Prequel Prenatal Screen; Future

## 2025-03-11 NOTE — PROGRESS NOTES
"[facility] Follow-up  25      SUBJECTIVE    HPI: Xavier Delacruz \"Karen\" is a 24 y.o.  at 10w6d here for RPNV. Denies contractions, bleeding, or LOF.  Patient reports doing well today. States her gluteal abscess is healing and that she is no longer having pain with it.     T2DM:   Fasting 91 -313 (8/14 abnormal), mixing pre and postprandials  (3/14 abnormal), lunch 103 - 219 (8/13 abnormal), 64 - 362 (5/14 abnormal), however there are mixed pre and post prandials and so the abnormal values may just be her post-prandials.     Reports takes Lantus 40u at bedtime, humalog 20 units with meals and sliding scale used as below: She uses the sliding scale after she eats.    - 150-200 2u   - 201-250 4u   - 251 - 300 6u   - 301-350 8u   - 351-400 10u   Ate some mac and cheese right before she came. Goes to bed about 9:30 -10 pm and has a small snack, doesn't eat overnight. Had a bowl of cereal for breakfast yesterday, a ham sandwich for lunch and had carrots and ranch for a snack. Didn't eat dinner last night but that is unusual for her and she eats dinner a lot.   POCT glu: 125    cHTN: no meds, reports highest was 131/78. Has been checking Bps at home. Denies HA, vision changes, SOB, CP, or RUQ pain.       Other pregnancy complications include    Patient Active Problem List   Diagnosis    Myopia, bilateral    Food insecurity    Type 2 diabetes mellitus with stage 1 chronic kidney disease, with long-term current use of insulin (Multi)    Chronic hypertension in pregnancy ([facility])    Gluteal abscess    Chlamydia infection affecting pregnancy in first trimester ([facility])    Rubella non-immune status, antepartum ([facility])    10 weeks gestation of pregnancy ([facility])       OBJECTIVE  Visit Vitals  /74   Pulse 88   Wt 101 kg (223 lb)   BMI 43.55 kg/m²   OB Status Pregnant   Smoking Status Never   BSA 2.07 m²    Constitutional: No visible distress, alert and cooperative  Respiratory/Thorax: Normal " "respiratory effort on RA  Cardiovascular: Reg rate  Gastrointestinal: soft, nondistended, nontender, gravid  Neurological: A&Ox3  Psychological: Appropriate mood and behavior    FHT: 167    ASSESSMENT & PLAN    Xavier Delacruz \"Karen\" is a 24 y.o.  at 10w6d here for the following concerns we addressed today:    Assessment & Plan  Chronic hypertension in pregnancy ([facility])  No meds  Normotensive today and at home per pt report   Last P:C 0.47, repeat ordered today  Orders:    Protein, Urine Random; Future    10 weeks gestation of pregnancy ([facility])  Discussed cfdna with pt, pt would like to get  NT US on 3/21  Otherwise UTD    Orders:    [facility] Prenatal Screen; Future    Type 2 diabetes mellitus with stage 1 chronic kidney disease, with long-term current use of insulin (Multi)  - BS log see below:     - patient reports mixing both pre and post prandials above in the postprandial list  - current regimen: lantus 40u at bedtime, humalog 20 units with meals and sliding scale used as above after her meals  - will change regimen to: lantus 40u at bedtime, humalog 24u with meals  - given new BS log with pre and post prandial spots  - encouraged to complete EKG           RTC in 4 weeks  Patient seen and evaluated with Dr. Feroz Arzate MD  PGY-1, Obstetrics and Gynecology       "

## 2025-03-11 NOTE — ASSESSMENT & PLAN NOTE
- BS log see below:     - patient reports mixing both pre and post prandials above in the postprandial list  - current regimen: lantus 40u at bedtime, humalog 20 units with meals and sliding scale used as above after her meals  - will change regimen to: lantus 40u at bedtime, humalog 24u with meals  - given new BS log with pre and post prandial spots  - encouraged to complete EKG

## 2025-03-12 ENCOUNTER — APPOINTMENT (OUTPATIENT)
Dept: LAB | Facility: HOSPITAL | Age: 25
End: 2025-03-12
Payer: MEDICARE

## 2025-03-13 LAB
CREAT UR-MCNC: 175 MG/DL (ref 20–275)
PROT UR-MCNC: 16 MG/DL (ref 5–24)
PROT/CREAT UR: 0.09 MG/MG CREAT (ref 0.02–0.18)
PROT/CREAT UR: 91 MG/G CREAT (ref 24–184)

## 2025-03-21 ENCOUNTER — HOSPITAL ENCOUNTER (OUTPATIENT)
Dept: RADIOLOGY | Facility: CLINIC | Age: 25
Discharge: HOME | End: 2025-03-21
Payer: MEDICARE

## 2025-03-21 DIAGNOSIS — O36.80X0 PREGNANCY, LOCATION UNKNOWN (HHS-HCC): ICD-10-CM

## 2025-03-21 DIAGNOSIS — Z36.82 ENCOUNTER FOR NUCHAL TRANSLUCENCY TESTING (HHS-HCC): ICD-10-CM

## 2025-03-21 DIAGNOSIS — O99.211 OBESITY AFFECTING PREGNANCY IN FIRST TRIMESTER (HHS-HCC): ICD-10-CM

## 2025-03-21 LAB
COMMENTS - MP RESULT TYPE: NORMAL
SCAN RESULT: NORMAL

## 2025-03-21 PROCEDURE — 76816 OB US FOLLOW-UP PER FETUS: CPT

## 2025-03-23 PROBLEM — Z3A.12 12 WEEKS GESTATION OF PREGNANCY (HHS-HCC): Status: ACTIVE | Noted: 2025-02-11

## 2025-03-25 ENCOUNTER — ROUTINE PRENATAL (OUTPATIENT)
Dept: MATERNAL FETAL MEDICINE | Facility: CLINIC | Age: 25
End: 2025-03-25
Payer: MEDICARE

## 2025-03-25 VITALS
WEIGHT: 223.1 LBS | SYSTOLIC BLOOD PRESSURE: 130 MMHG | HEART RATE: 83 BPM | DIASTOLIC BLOOD PRESSURE: 81 MMHG | BODY MASS INDEX: 43.57 KG/M2

## 2025-03-25 DIAGNOSIS — O10.919 CHRONIC HYPERTENSION IN PREGNANCY (HHS-HCC): ICD-10-CM

## 2025-03-25 DIAGNOSIS — O09.899 RUBELLA NON-IMMUNE STATUS, ANTEPARTUM (HHS-HCC): ICD-10-CM

## 2025-03-25 DIAGNOSIS — E11.22 TYPE 2 DIABETES MELLITUS WITH STAGE 1 CHRONIC KIDNEY DISEASE, WITH LONG-TERM CURRENT USE OF INSULIN (MULTI): ICD-10-CM

## 2025-03-25 DIAGNOSIS — Z3A.12 12 WEEKS GESTATION OF PREGNANCY (HHS-HCC): Primary | ICD-10-CM

## 2025-03-25 DIAGNOSIS — A74.9 CHLAMYDIA INFECTION AFFECTING PREGNANCY IN FIRST TRIMESTER (HHS-HCC): ICD-10-CM

## 2025-03-25 DIAGNOSIS — Z79.4 TYPE 2 DIABETES MELLITUS WITH STAGE 1 CHRONIC KIDNEY DISEASE, WITH LONG-TERM CURRENT USE OF INSULIN (MULTI): ICD-10-CM

## 2025-03-25 DIAGNOSIS — Z28.39 RUBELLA NON-IMMUNE STATUS, ANTEPARTUM (HHS-HCC): ICD-10-CM

## 2025-03-25 DIAGNOSIS — N18.1 TYPE 2 DIABETES MELLITUS WITH STAGE 1 CHRONIC KIDNEY DISEASE, WITH LONG-TERM CURRENT USE OF INSULIN (MULTI): ICD-10-CM

## 2025-03-25 DIAGNOSIS — O98.811 CHLAMYDIA INFECTION AFFECTING PREGNANCY IN FIRST TRIMESTER (HHS-HCC): ICD-10-CM

## 2025-03-25 LAB — GLUCOSE BLD MANUAL STRIP-MCNC: 78 MG/DL (ref 74–99)

## 2025-03-25 PROCEDURE — 0501F PRENATAL FLOW SHEET: CPT | Performed by: OBSTETRICS & GYNECOLOGY

## 2025-03-25 PROCEDURE — 82947 ASSAY GLUCOSE BLOOD QUANT: CPT | Performed by: OBSTETRICS & GYNECOLOGY

## 2025-03-25 PROCEDURE — 99214 OFFICE O/P EST MOD 30 MIN: CPT | Mod: GC | Performed by: OBSTETRICS & GYNECOLOGY

## 2025-03-25 NOTE — LETTER
3/25/2025    To Whom It May Concern:    Karen Delacruz is being followed for her pregnancy at [facility].  Estimated Date of Delivery: 10/1/25    Because of my patient's pregnant medical condition, she  •may not be put at risk of being kicked in the stomach  •may not climb ladders  •may not lift more than 25 pounds by herself     She is able to continue working with a reasonable accommodation. Suggested accommodations include:  •Acquisition of equipment for sitting  •More frequent or longer breaks: 15 minute breaks every 2 hours   •Ability to eat or drink water as needed  •Ability to attend pre-scheduled medical appointments  •Assistance with lifting or other manual labor  •Temporarily modified work duties  •Modified work schedules or telecommuting  •Ability to work 8 hour shifts and a 40 hour work week    In addition, she will require time for self-care to monitor and manage diabetes. This includes time for blood sugar checks, meals, snacks, and medication administration.      The patient's condition and need for accommodation is expected to last until she delivers her baby.     For more information, you may wish to consult the [employer] at [email or web address] <[email or web address].[email or web address]>        Sincerely,    Otto Redman MD  [facility]

## 2025-03-25 NOTE — PROGRESS NOTES
"[facility] Follow-up        SUBJECTIVE    HPI: Xavier Delacruz \"Chakari\" is a 24 y.o.  at 12w6d here for RPNV. Denies bleeding or cramping.    Taking lantus 40 at night and humalog 21 with meals.  BG log:  - All fastings are elevated in 110s-120s  - >75% of postprandials within goal    Preg n/f:  -cHTN: no meds, baseline labs wnl, P:C 0.47 > 0.091 on 3/11   -T2DM: 15.0% on , bASA   *Lantus 40 units at bedtime, humalog 21u w/meals   - CT in  trim: s/p tx, neg DALILA    - BMI 41   -gluteal abscess s/p I&D on  and OR I&D on , s/p Augmentin tx   -Rubella NI   -routine PNC: rrcfDNA, NT US 3/21, UTD     Other pregnancy complications include    Patient Active Problem List   Diagnosis    Myopia, bilateral    Food insecurity    Type 2 diabetes mellitus with stage 1 chronic kidney disease, with long-term current use of insulin (Multi)    Chronic hypertension in pregnancy ([facility])    Gluteal abscess    Chlamydia infection affecting pregnancy in first trimester ([facility])    Rubella non-immune status, antepartum ([facility])    12 weeks gestation of pregnancy ([facility])       OBJECTIVE  Visit Vitals  /81   Pulse 83   Wt 101 kg (223 lb 1.6 oz)   BMI 43.57 kg/m²   OB Status Pregnant   Smoking Status Never   BSA 2.07 m²      FHT: 130s on BSUS    ASSESSMENT & PLAN    Xavier Delacruz \"Chakari\" is a 24 y.o.  at 12w6d here for the following concerns we addressed today:    Chronic hypertension in pregnancy ([facility])  - BP log reviewed, normotensive, cont no meds    Type 2 diabetes mellitus with stage 1 chronic kidney disease, with long-term current use of insulin (Multi)  - Increased lantus to 50u at night, no changes to humalog 21u with meals  - Overall significant improvement from prior BG control  - Fetal echo and anatomy US already scheduled    12 weeks gestation of pregnancy ([facility])  - s/p NT US, has upcoming anatomy scan scheduled       Orders Placed This Encounter   Procedures    POCT " GLUCOSE     Order Specific Question:   Release result to MyChart     Answer:   Immediate        RTC in 3 weeks    Patient seen and evaluated with Dr. Feroz Medrano MD PGY3

## 2025-03-25 NOTE — LETTER
3/25/2025      To Whom It May Concern:    Karen Delacruz is being followed for her pregnancy at [facility].  Estimated Date of Delivery: 10/1/25            Sincerely,      Otto Redman MD  [facility]

## 2025-03-25 NOTE — ASSESSMENT & PLAN NOTE
- Increased lantus to 50u at night, no changes to humalog 21u with meals  - Overall significant improvement from prior BG control  - Fetal echo and anatomy US already scheduled

## 2025-04-11 ENCOUNTER — PATIENT MESSAGE (OUTPATIENT)
Dept: MATERNAL FETAL MEDICINE | Facility: HOSPITAL | Age: 25
End: 2025-04-11
Payer: MEDICARE

## 2025-04-14 DIAGNOSIS — E10.22 TYPE 1 DIABETES MELLITUS WITH DIABETIC CHRONIC KIDNEY DISEASE, UNSPECIFIED CKD STAGE: ICD-10-CM

## 2025-04-14 PROBLEM — Z3A.15 15 WEEKS GESTATION OF PREGNANCY (HHS-HCC): Status: ACTIVE | Noted: 2025-02-11

## 2025-04-15 ENCOUNTER — APPOINTMENT (OUTPATIENT)
Dept: MATERNAL FETAL MEDICINE | Facility: CLINIC | Age: 25
End: 2025-04-15
Payer: MEDICARE

## 2025-04-16 ENCOUNTER — APPOINTMENT (OUTPATIENT)
Dept: OBSTETRICS AND GYNECOLOGY | Facility: CLINIC | Age: 25
End: 2025-04-16
Payer: MEDICARE

## 2025-04-17 ENCOUNTER — PATIENT MESSAGE (OUTPATIENT)
Dept: OBSTETRICS AND GYNECOLOGY | Facility: CLINIC | Age: 25
End: 2025-04-17
Payer: MEDICARE

## 2025-04-17 RX ORDER — FLASH GLUCOSE SENSOR
KIT MISCELLANEOUS
Refills: 0 | OUTPATIENT
Start: 2025-04-17

## 2025-04-17 RX ORDER — BLOOD-GLUCOSE SENSOR
EACH MISCELLANEOUS
Qty: 2 EACH | Refills: 11 | Status: SHIPPED | OUTPATIENT
Start: 2025-04-17

## 2025-04-17 NOTE — TELEPHONE ENCOUNTER
Received request for Libre2  CGM , verified Karen Delacruz would like Libre3 CGM.  Order placed in March appears valid.  Refill Prescription sent to pharmacy on record to ensure able to  Libre3 CGM .

## 2025-04-21 ENCOUNTER — TELEPHONE (OUTPATIENT)
Dept: MATERNAL FETAL MEDICINE | Facility: CLINIC | Age: 25
End: 2025-04-21
Payer: MEDICARE

## 2025-04-21 PROCEDURE — RXMED WILLOW AMBULATORY MEDICATION CHARGE

## 2025-04-21 NOTE — TELEPHONE ENCOUNTER
Communicated with the patient on 4/21/2025  She has Type 2 Diabetes @ 16w5d     The patient checks her sugars fasting and 1 hour after meals, and reports them as follows:        Impression:  Confirmed current regimen - did not increase Lantus as per last visit note, still taking 40 units at bedtime.   Extreme fasting variability - states she often eats a snack overnight, which accounts for blood sugars >200 in the morning. Some lows before meals when she is delayed eating, or has small meal prior.  Will maintain Lantus @ 40 units as to not increase incidence of daytime hypoglycemia.  Discussed taking small dose Lispro when eating overnight snack to help with elevations in the early morning. Agreeable to start Lispro 10 units with overnight snack.  To call if she experiences hypoglycemia overnight.      The patient's regimen was changed, as discussed with BK Majano,to:   Lantus 40 at bedtime  Lispro 21 with meals TID, 10* with overnight snack    Patient understands to submit sugar log for review weekly through the [employer] @ [phone] or via email to [email or web address] to help optimize glucose control.

## 2025-04-23 PROCEDURE — RXMED WILLOW AMBULATORY MEDICATION CHARGE

## 2025-04-25 ENCOUNTER — PHARMACY VISIT (OUTPATIENT)
Dept: PHARMACY | Facility: CLINIC | Age: 25
End: 2025-04-25
Payer: COMMERCIAL

## 2025-05-06 ENCOUNTER — HOSPITAL ENCOUNTER (OUTPATIENT)
Dept: RADIOLOGY | Facility: CLINIC | Age: 25
Discharge: HOME | End: 2025-05-06
Payer: MEDICARE

## 2025-05-06 ENCOUNTER — ROUTINE PRENATAL (OUTPATIENT)
Dept: MATERNAL FETAL MEDICINE | Facility: CLINIC | Age: 25
End: 2025-05-06
Payer: MEDICARE

## 2025-05-06 VITALS
BODY MASS INDEX: 44.41 KG/M2 | HEART RATE: 105 BPM | WEIGHT: 227.38 LBS | DIASTOLIC BLOOD PRESSURE: 68 MMHG | SYSTOLIC BLOOD PRESSURE: 111 MMHG

## 2025-05-06 DIAGNOSIS — Z03.73 FETAL ANOMALY SUSPECTED BUT NOT FOUND: ICD-10-CM

## 2025-05-06 DIAGNOSIS — E11.22 TYPE 2 DIABETES MELLITUS WITH STAGE 1 CHRONIC KIDNEY DISEASE, WITH LONG-TERM CURRENT USE OF INSULIN (MULTI): ICD-10-CM

## 2025-05-06 DIAGNOSIS — O36.80X0 PREGNANCY, LOCATION UNKNOWN (HHS-HCC): ICD-10-CM

## 2025-05-06 DIAGNOSIS — O10.919 CHRONIC HYPERTENSION AFFECTING PREGNANCY (HHS-HCC): ICD-10-CM

## 2025-05-06 DIAGNOSIS — O09.899 RUBELLA NON-IMMUNE STATUS, ANTEPARTUM (HHS-HCC): ICD-10-CM

## 2025-05-06 DIAGNOSIS — N18.1 TYPE 2 DIABETES MELLITUS WITH STAGE 1 CHRONIC KIDNEY DISEASE, WITH LONG-TERM CURRENT USE OF INSULIN (MULTI): ICD-10-CM

## 2025-05-06 DIAGNOSIS — A74.9 CHLAMYDIA INFECTION AFFECTING PREGNANCY IN FIRST TRIMESTER (HHS-HCC): ICD-10-CM

## 2025-05-06 DIAGNOSIS — Z28.39 RUBELLA NON-IMMUNE STATUS, ANTEPARTUM (HHS-HCC): ICD-10-CM

## 2025-05-06 DIAGNOSIS — O98.811 CHLAMYDIA INFECTION AFFECTING PREGNANCY IN FIRST TRIMESTER (HHS-HCC): ICD-10-CM

## 2025-05-06 DIAGNOSIS — Z79.4 TYPE 2 DIABETES MELLITUS WITH STAGE 1 CHRONIC KIDNEY DISEASE, WITH LONG-TERM CURRENT USE OF INSULIN (MULTI): ICD-10-CM

## 2025-05-06 DIAGNOSIS — R06.83 SNORING: ICD-10-CM

## 2025-05-06 DIAGNOSIS — O24.112: ICD-10-CM

## 2025-05-06 DIAGNOSIS — O10.919 CHRONIC HYPERTENSION IN PREGNANCY (HHS-HCC): ICD-10-CM

## 2025-05-06 DIAGNOSIS — Z3A.18 18 WEEKS GESTATION OF PREGNANCY (HHS-HCC): Primary | ICD-10-CM

## 2025-05-06 DIAGNOSIS — O99.212 OBESITY AFFECTING PREGNANCY IN SECOND TRIMESTER (HHS-HCC): ICD-10-CM

## 2025-05-06 LAB — GLUCOSE BLD MANUAL STRIP-MCNC: 86 MG/DL (ref 74–99)

## 2025-05-06 PROCEDURE — 82947 ASSAY GLUCOSE BLOOD QUANT: CPT

## 2025-05-06 PROCEDURE — 76811 OB US DETAILED SNGL FETUS: CPT

## 2025-05-06 PROCEDURE — 99214 OFFICE O/P EST MOD 30 MIN: CPT | Mod: GC | Performed by: OBSTETRICS & GYNECOLOGY

## 2025-05-06 ASSESSMENT — PAIN - FUNCTIONAL ASSESSMENT: PAIN_FUNCTIONAL_ASSESSMENT: 0-10

## 2025-05-06 ASSESSMENT — PAIN SCALES - GENERAL: PAINLEVEL_OUTOF10: 0 - NO PAIN

## 2025-05-06 NOTE — PROGRESS NOTES
"[facility] Follow-up        SUBJECTIVE    HPI: Xavier Delacruz \"Chakari\" is a 24 y.o.  at 18w6d here for RPNV. Denies bleeding LOF, or cramping/contractions. Feels sugars are doing better, dropping low when sleeping at night. In morning feels sometimes they are high because she will eat something when drops low at night. After meals 150-200s. Doesn't eat a snack before bed. Has not talked to  nutritionist. Taking humalog 21 with meals, and 40u lantus at bedtime. Doesn't take extra doses when sees running high. Will take 10u with snacks. Doesn't feel lower times are related to when taking short acting. Feels low after taking long acting.     Has been taking BP at home. 150s/90s. Denies HA, chest pain, SOB, change in vision, RUQ pain.         Preg n/f:  -cHTN: no meds, baseline labs wnl, P:C 0.47 > 0.091 on 3/11   -T2DM: 15.0% on , bASA   *Lantus 40 units at bedtime, humalog 21u w/meals   - CT in  trim: s/p tx, neg DALILA    - BMI 41   -gluteal abscess s/p I&D on  and OR I&D on , s/p Augmentin tx   -Rubella NI   -routine PNC: rrcfDNA, NT US 3/21, UTD     Other pregnancy complications include    Patient Active Problem List   Diagnosis    Myopia, bilateral    Food insecurity    Type 2 diabetes mellitus with stage 1 chronic kidney disease, with long-term current use of insulin (Multi)    Chronic hypertension in pregnancy ([facility])    Gluteal abscess    Chlamydia infection affecting pregnancy in first trimester ([facility])    Rubella non-immune status, antepartum ([facility])    18 weeks gestation of pregnancy ([facility])       OBJECTIVE  Visit Vitals  /68   Pulse 105   Wt 103 kg (227 lb 6 oz)   BMI 44.41 kg/m²   OB Status Pregnant   Smoking Status Never   BSA 2.09 m²      FHT: anatomy ultrasound     ASSESSMENT & PLAN    Xavier Delacruz \"Chakari\" is a 24 y.o.  at 12w6d here for the following concerns we addressed today:    Chronic hypertension in pregnancy ([facility])  -Normotensive in " clinic today.   -Currently no meds.   -Discussed presenting to triage if BP >160/110 or symptoms   -Cont daily log  -Encouraged to bring blood pressure cuff to next visit     Type 2 diabetes mellitus with stage 1 chronic kidney disease, with long-term current use of insulin (Multi)  -POC 86 (CGM 66)   -Patient is currently wearing continuous glucose monitor.  Report reviewed today including at least 72 hours of glucose data, based on this review adjustments were made as noted.  -Majority of time AM fasting and postprandials not in goal range  -Discussed goal of fasting <95 and 1hr postprandial <140   -Current regimen: 40u lantus at bedtime and humalog 21 with meals, 10u with snack overnight   -New regimen: Recommend taking start taking 40u lantus  in AM instead of at bedtime. Increase humalog to 30 units with meals.  -Echo ordered   -Referred to podiatry and ophthalmology   -Repeat A1C today   -Fetal echo scheduled for 5/12    18 weeks gestation of pregnancy ([facility])  -Anatomy scan completed today   -Up to date            Orders Placed This Encounter   Procedures    Referral to Ophthalmology     Standing Status:   Future     Expected Date:   5/6/2025     Expiration Date:   5/6/2026     Referral Priority:   Routine     Referral Type:   Consultation     Referral Reason:   Specialty Services Required     Requested Specialty:   Ophthalmology     Number of Visits Requested:   1    Referral to Podiatry     Standing Status:   Future     Expected Date:   5/6/2025     Expiration Date:   5/6/2026     Referral Priority:   Routine     Referral Type:   Consultation     Referral Reason:   Specialty Services Required     Requested Specialty:   Podiatry     Number of Visits Requested:   1    Transthoracic Echo (TTE) Complete     Standing Status:   Future     Expected Date:   5/6/2025     Expiration Date:   5/6/2027     Reason for exam::   T2DM     Possible 3D echo?:   No     Possible strain echo?:   No     Where is your preferred  location to perform this study?:   First Available        RTC in 3 weeks    Patient seen and evaluated with Dr. Feroz King MD PGY1

## 2025-05-06 NOTE — ASSESSMENT & PLAN NOTE
-Normotensive in clinic today.   -Currently no meds.   -Discussed presenting to triage if BP >160/110 or symptoms   -Cont daily log  -Encouraged to bring blood pressure cuff to next visit

## 2025-05-06 NOTE — ASSESSMENT & PLAN NOTE
-POC 86 (CGM 66)   -Majority of time AM fasting and postprandials not in goal range  -Discussed goal of fasting <95 and 1hr postprandial <140   -Current regimen: 40u lantus at bedtime and humalog 21 with meals, 10u with snack overnight   -New regimen: Recommend taking start taking 40u lantus  in AM instead of at bedtime. Increase humalog to 30 units with meals.  -Echo ordered   -Referred to podiatry and ophthalmology   -Repeat A1C today   -Fetal echo scheduled for 5/12

## 2025-05-06 NOTE — PATIENT INSTRUCTIONS
Start taking 40u of lantus in the morning instead of at night.   Start taking 30units of humalog 5-10 minutes before meals.

## 2025-05-07 ENCOUNTER — APPOINTMENT (OUTPATIENT)
Dept: RADIOLOGY | Facility: CLINIC | Age: 25
End: 2025-05-07
Payer: MEDICARE

## 2025-05-08 ENCOUNTER — NUTRITION (OUTPATIENT)
Dept: OBSTETRICS AND GYNECOLOGY | Facility: CLINIC | Age: 25
End: 2025-05-08
Payer: MEDICARE

## 2025-05-08 NOTE — PROGRESS NOTES
"Nutrition Initial Assessment:     Patient Karen Delacruz is a 24 y.o. female being seen via virtual visit, phone call only (video option not available) who was referred for diabetes in pregnancy.    Nutrition Assessment    Patient Active Problem List   Diagnosis    Myopia, bilateral    Food insecurity    Type 2 diabetes mellitus with stage 1 chronic kidney disease, with long-term current use of insulin (Multi)    Chronic hypertension in pregnancy ([facility])    Gluteal abscess    Chlamydia infection affecting pregnancy in first trimester ([facility])    Rubella non-immune status, antepartum ([facility])    18 weeks gestation of pregnancy ([facility])     Nutrition History:  Food & Nutrition History:    Per pt:  Insulin regimen changed at last OBFUV-- reports that hypoglycemia overnight has improved. FBG 86 this morning.  5/6p- dinner  9p or later - bed. HS snack on occasion: fruit, chips or a piece of candy  Reports regular meal and snack intake throughout the day.     Anthropometrics:  GA: 19-1  Pre-pregnancy wt: ~213 lbs  Pre-pregnancy BMI: 32.4  Pregnancy weight change: 14lb net wt gain to-date  Assessment: At pre-pregnancy BMI of 30.0 or greater, total recommended weight gain is 11-20 pounds, with a recommended 2nd and 3rd trimester weight gain of 0.4 - 0.6 pounds per week.    Weight History:   Daily Weight  05/12/25 : 103 kg (227 lb 1.2 oz)  05/06/25 : 103 kg (227 lb 6 oz)  03/25/25 : 101 kg (223 lb 1.6 oz)  03/11/25 : 101 kg (223 lb)  02/25/25 : 95.8 kg (211 lb 1.6 oz)  02/19/25 : 90.7 kg (200 lb)  02/11/25 : 91 kg (200 lb 9.6 oz)  01/28/25 : 91.2 kg (201 lb)  01/22/25 : 96.7 kg (213 lb 3 oz)  10/28/24 : 95.1 kg (209 lb 11.2 oz)    Nutrition Focused Physical Exam Findings:  Deferred, appointment is either virtual or phone only    Nutrition Significant Labs:  Vitamin D: No results found for: \"VITD25\"   **Recommended level in pregnancy: 40ng/mL    Medications:  Current Outpatient Medications   Medication Instructions    " "alcohol swabs pads, medicated Use 1, up to 5 times a day    aspirin 162 mg, oral, Daily    blood pressure test kit-large kit 1 each, miscellaneous, Daily    blood pressure test kit-large kit Please take your blood pressure twice daily.    blood sugar diagnostic (Blood Glucose Test) strip Use 1 strip 4-6 times a day during pregnancy    FreeStyle Leobardo 3 Sensor device Use asn directed to measure glucose in real time changing every 14 days    glucagon (Glucagon Emergency Kit, human,) 1 mg injection Use for treatment of severe hypoglycemia    glucagon 1 mg, intramuscular, Every 15 min PRN    insulin glargine (Lantus Solostar U-100 Insulin) 100 unit/mL (3 mL) pen Inject 10 units subcutaneously at bedtime. May increase to 50 units total per day during pregnancy as needed.    insulin lispro (HumaLOG KwikPen Insulin) 100 unit/mL injection Inject 6 units subcutaneously prior to meals. May increase to 30 units total per day during pregnancy as needed.    lancets 33 gauge misc Use 1 lancet 4-6 times per day during pregnancy    pen needle, diabetic (Pen Needle) 32 gauge x 5/32\" needle Use 1 per injection, up to 5 times a day    prenatal vitamin, iron-folic, 27 mg iron-800 mcg folic acid tablet 1 tablet, oral, Daily        Estimated Needs:  *Tracking prenatal weight gain is the recommended method of determining adequacy of caloric intake.*    1st trimester EER = Nonpregnant EER + 0 kcal   2nd trimester EER = Nonpregnant EER + 340 kcal ( 400 kcal if underweight pre-pregnancy)  3rd trimester EER = Nonpregnant EER + 452 kcal  (600 kcal if underweight)    Nutrition Diagnosis     Nutrition Diagnosis  Patient has Nutrition Diagnosis: Yes  Additional Assessment Information (1): Altered nutrition-related laboratory values (diagnostic, fingerstick or CGM data) r/t diabetes in pregnancy, as evidenced by fasting and/or postprandial blood glucose levels outside of target ranges.      Nutrition Interventions/Recommendations     Nutrition " "Interventions:   Food and Nutrient Delivery:     General Recommendations     Daily carbohydrate distribution:     Breakfast: 1-2 carbohydrate servings (15-30 grams of carbohydrate) -- avoid concentrated sweets/high GI foods: fruit, fruit juices, cold cereal and milk, syrup, jams and jellies, etc.   AM Snack: 1-2 servings   Lunch: 3-4 servings (45-60g)   Midday Snack: 1-2 servings   Dinner: 3-4 servings   HS Snack: 1-2 servings (15-30g)     -Be sure to add protein food to carb choices at each meal and snack: cheese, meat, eggs, lower sugar Greek yogurt, cottage cheese, nuts/seeds, peanut butter, etc     -During the day, eat about every 2 - 4 hours. Avoid very long periods without eating.     -Avoid more than 8-10 hours overnight without eating. If more than about 2 hours between dinner and bedtime, have a carb plus protein snack. Ex: Tricuits and cheese or a half-sandwich on whole wheat bread.     -Higher fiber carbohydrates/whole grains are recommended over refined carb choices. Ex: 100% whole wheat breads, brown rice, Triscuits, popcorn. Including non-starchy vegetables at meals can help manage blood sugars by adding more fiber as well.     -Avoid juices and other sugar-sweetened/carb-containing beverages. Diet and \"zero\" drinks are fine. Try eliminating milk at mealtime if noticing after-meal BG elevations. Unsweetened almond or soymilk beverages are a lower-carb dairy alternative.     -Recommended Dietary Allowance for carbohydrates during pregnancy is a minimum of 175g/day (11-12 servings) to provide 33g/day for fetal brain development. Many women do well eating a bit less than 175g CHO/day--this is fine, as long as there is no purposeful, severe restriction of carb foods at meals and snacks (ex: Keto or Atkins-style diets for BG control) and no symptoms indicating inadequate carb intake-- loss of weight, feeling dizzy/lightheaded, irritable, confused, excessively hungry, etc.    Nutrition Monitoring and " Evaluation     Biochemical Data, Medical Tests and Procedures  Monitoring and Evaluation Plan: Glucose/endocrine profile  Glucose/Endocrine Profile: Glucose, fasting, Other (Comment)  Criteria: Fasting BG 95 or less; 1hr post-meal  or less. Blood sugars will be submitted by pt to [facility] for review weekly. Follow-up as needed to assess goal attainment. Modifications based on further assessment and self-blood glucose monitoring results. Pt expresses understanding and agreement.      Time Spent  Time spent directly with patient, family or caregiver: 10 minutes

## 2025-05-12 ENCOUNTER — OFFICE VISIT (OUTPATIENT)
Dept: PEDIATRIC CARDIOLOGY | Facility: HOSPITAL | Age: 25
End: 2025-05-12
Payer: MEDICARE

## 2025-05-12 ENCOUNTER — APPOINTMENT (OUTPATIENT)
Dept: ENDOCRINOLOGY | Facility: CLINIC | Age: 25
End: 2025-05-12
Payer: MEDICARE

## 2025-05-12 ENCOUNTER — HOSPITAL ENCOUNTER (OUTPATIENT)
Dept: PEDIATRIC CARDIOLOGY | Facility: HOSPITAL | Age: 25
Discharge: HOME | End: 2025-05-12
Payer: MEDICARE

## 2025-05-12 VITALS
OXYGEN SATURATION: 99 % | DIASTOLIC BLOOD PRESSURE: 72 MMHG | WEIGHT: 227.07 LBS | HEIGHT: 69 IN | SYSTOLIC BLOOD PRESSURE: 111 MMHG | BODY MASS INDEX: 33.63 KG/M2 | HEART RATE: 84 BPM

## 2025-05-12 DIAGNOSIS — Z79.4 TYPE 2 DIABETES MELLITUS WITH STAGE 1 CHRONIC KIDNEY DISEASE, WITH LONG-TERM CURRENT USE OF INSULIN (MULTI): Primary | ICD-10-CM

## 2025-05-12 DIAGNOSIS — E11.22 TYPE 2 DIABETES MELLITUS WITH STAGE 1 CHRONIC KIDNEY DISEASE, WITH LONG-TERM CURRENT USE OF INSULIN (MULTI): Primary | ICD-10-CM

## 2025-05-12 DIAGNOSIS — N18.1 TYPE 2 DIABETES MELLITUS WITH STAGE 1 CHRONIC KIDNEY DISEASE, WITH LONG-TERM CURRENT USE OF INSULIN (MULTI): Primary | ICD-10-CM

## 2025-05-12 DIAGNOSIS — O35.8XX0 MATERNAL CARE FOR OTHER (SUSPECTED) FETAL ABNORMALITY AND DAMAGE, NOT APPLICABLE OR UNSPECIFIED (HHS-HCC): ICD-10-CM

## 2025-05-12 DIAGNOSIS — O35.9XX1 SUSPECTED FETAL ANOMALY, ANTEPARTUM, FETUS 1 (HHS-HCC): ICD-10-CM

## 2025-05-12 PROCEDURE — 93325 DOPPLER ECHO COLOR FLOW MAPG: CPT | Performed by: PEDIATRICS

## 2025-05-12 PROCEDURE — 76825 ECHO EXAM OF FETAL HEART: CPT | Performed by: PEDIATRICS

## 2025-05-12 PROCEDURE — 3046F HEMOGLOBIN A1C LEVEL >9.0%: CPT | Performed by: PEDIATRICS

## 2025-05-12 PROCEDURE — 76827 ECHO EXAM OF FETAL HEART: CPT | Performed by: PEDIATRICS

## 2025-05-12 PROCEDURE — 3078F DIAST BP <80 MM HG: CPT | Performed by: PEDIATRICS

## 2025-05-12 PROCEDURE — 99214 OFFICE O/P EST MOD 30 MIN: CPT | Mod: 25 | Performed by: PEDIATRICS

## 2025-05-12 PROCEDURE — 76825 ECHO EXAM OF FETAL HEART: CPT

## 2025-05-12 PROCEDURE — 3074F SYST BP LT 130 MM HG: CPT | Performed by: PEDIATRICS

## 2025-05-12 PROCEDURE — 1036F TOBACCO NON-USER: CPT | Performed by: PEDIATRICS

## 2025-05-12 PROCEDURE — 3008F BODY MASS INDEX DOCD: CPT | Performed by: PEDIATRICS

## 2025-05-12 PROCEDURE — 3060F POS MICROALBUMINURIA REV: CPT | Performed by: PEDIATRICS

## 2025-05-12 PROCEDURE — 99204 OFFICE O/P NEW MOD 45 MIN: CPT | Performed by: PEDIATRICS

## 2025-05-12 NOTE — LETTER
May 12, 2025     Otto Redman MD  [address] [facility]  [place] [address]    Patient: Karen Delacruz   YOB: 2000   Date of Visit: 2025       Dear Dr. Otto Redman MD:    Thank you for referring Karen Delacruz to me for evaluation. Below are my notes for this consultation.  If you have questions, please do not hesitate to call me. I look forward to following your patient along with you.       Sincerely,     Beth Starks MD      CC: Lashon Humphrey, APRN-CNP  Charis Adair, BK-JEANMARIE, DNP  ______________________________________________________________________________________         [facility]  [facility]  Division of Pediatric Cardiology  [facility]  [address] [place] [address], 1st Floor, [place] [address]  Tel: [phone], Fax [phone]      Obstetrician: Dr. Otto Redman     Xavier Delacruz was seen at the request of Dr. Otto Redman for pre-gestational diabetes.  Records were reviewed, and a summary of those records is integrated within the history of present illness.  A report with my findings is being sent via written or electronic means to the referring physician with my recommendations.    Accompanied by: sister  History obtained from: patient    History of Presentation   History of Present Illness:   Xavier Delacruz is a 24 y.o. female presenting for initial prenatal cardiology consultation and fetal echocardiogram for pre-gestational diabetes.  She is  and approximately 19w5d weeks pregnant (Last menstrual period 2024), Estimated Date of Delivery: 10/1/25) with a Male fetus.  Her obstetric history is significant for one  vaginal delivery, induced at 35 weeks for gestational hypertension.  Complications of her current pregnancy include obesity, pre-gestational diabetes, and hypertension (HbA1c 15,  BMI 44).  Ms. Xavier Delacruz had a normal first trimester screen.  Her level 2 obstetric ultrasound for anatomy was performed at 18 weeks gestational age and was incomplete due to gestational age, maternal acoustic properties and fetal lie, although no malformations were identified.  She has undergone cell free fetal DNA testing and it was normal.  She has not had invasive genetic testing such as amniocentesis or chorionic villous sampling.  This pregnancy was not the result of in vitro fertilization.  She was not using potentially teratogenic medication at the time of conception.  There have been no other complications of this pregnancy.  Ms. Xavier Delacruz plans to deliver her baby at [facility].    Ms. Xavier Delacruz feels well today.  She denies any shortness of breath, abdominal cramping, contractions, bleeding, or swelling of the extremities.  She notes frequent fetal movement.      Medical History     Medical Conditions:  Problem List[1]  Past Surgeries:  Surgical History[2]    Current Outpatient Medications   Medication Instructions   • alcohol swabs pads, medicated Use 1, up to 5 times a day   • aspirin 162 mg, oral, Daily   • blood pressure test kit-large kit 1 each, miscellaneous, Daily   • blood pressure test kit-large kit Please take your blood pressure twice daily.   • blood sugar diagnostic (Blood Glucose Test) strip Use 1 strip 4-6 times a day during pregnancy   • docusate sodium (COLACE) 100 mg, oral, 2 times daily PRN   • FreeStyle Leobardo 3 Sensor device Use asn directed to measure glucose in real time changing every 14 days   • glucagon (Glucagon Emergency Kit, human,) 1 mg injection Use for treatment of severe hypoglycemia   • glucagon 1 mg, intramuscular, Every 15 min PRN   • insulin glargine (Lantus Solostar U-100 Insulin) 100 unit/mL (3 mL) pen Inject 10 units subcutaneously at bedtime. May increase to 50 units total per day during pregnancy as needed.  "  • insulin lispro (HumaLOG KwikPen Insulin) 100 unit/mL injection Inject 6 units subcutaneously prior to meals. May increase to 30 units total per day during pregnancy as needed.   • lancets 33 gauge misc Use 1 lancet 4-6 times per day during pregnancy   • metroNIDAZOLE (FLAGYL) 500 mg, oral, 2 times daily   • pen needle, diabetic (Pen Needle) 32 gauge x 5/32\" needle Use 1 per injection, up to 5 times a day   • prenatal vitamin, iron-folic, 27 mg iron-800 mcg folic acid tablet 1 tablet, oral, Daily      Allergies:  Patient has no known allergies.    Social History:  Patient lives with her sister, daughter and step parents.  Occupation: No occupation  Smoking: None  Alcohol: None  Drug Use: None  She wears a seatbelt while in the car. She denies any verbal, sexual or physical abuse.     Cardiac Family History (for patient and father of baby):  There is no history of congenital heart disease.  There is no history of early sudden/unexplained death including SIDS and drowning.  There is no history of cardiomyopathy of any type or heart transplant.  There is no history of arrhythmias/pacemaker/defibrillator or arrhythmia syndromes, including Long QT syndrome, Alfredo-Parkinson-White syndrome or Brugada syndrome.  There is no history of heart attack or stroke before the age of 55 years in a close family member.  There is no history of Marfan syndrome or aortic aneurysm.  There is no history of deafness.  There is no history of syncope/fainting.  There is no history of high blood pressure or high cholesterol.  There is no history of DiGeorge Syndrome (22q11).    Physical Examination     /72 (BP Location: Right arm, Patient Position: Sitting)   Pulse 84   Ht 1.743 m (5' 8.62\")   Wt 103 kg (227 lb 1.2 oz)   SpO2 99%   BMI 33.90 kg/m²     General: Alert, well-appearing and in no acute distress.    Abdomen: Soft, nontender, not distended. Gravid.  Extremities: No swelling or edema.  Neurologic / Psychiatric: Grossly " intact without focal deficits.  Appropriate demeanor.      Results     Fetal Echocardiogram:    I ordered and interpreted a transabdominal fetal echocardiogram.  I performed a portion of the study myself.  The complete report is available under separate cover.  The results are summarized as follows:    Image quality: Good  Cardiac situs: Cardiac mass in the left chest.  Left ventricular apex points leftward.  Segmental anatomy: Atrio-ventricular concordance.  Ventriculo-arterial concordance.  Normally-related great arteries.  Superior vena cava: Normal connection to the right atrium.  Inferior vena cava: Normal connection to the right atrium.  Pulmonary veins: At least one pulmonary vein connects normally to the left atrium.  Atrial septum: Patent foramen ovale, with flap valve bowing into the left atrium.  Tricuspid valve: Structurally normal.  No obvious stenosis or insufficiency.  Mitral valve: Structurally normal.  No obvious stenosis or insufficiency.  Right ventricle: Normal ventricular size, wall thickness, and systolic function (qualitative).  Left ventricle: Normal ventricular size, wall thickness, and systolic function (qualitative).  Interventricular septum: No obvious septal defect.  Aortic valve: Structurally normal.  No obvious stenosis or insufficiency.  Pulmonary valve: Structurally normal.  No obvious stenosis or insufficiency.  Pulmonary artery: Normal in size.  Ductal arch: Patent with right to left shunting.  Aortic arch: Left-sided.  Patent.  Pericardial effusion: None.  Umbilical arteries: Two umbilical arteries.  Normal arterial flow pattern.  Umbilical vein: Normal venous flow pattern.  Electrophysiology: Normal fetal heart rate and rhythm.  Normal mechanical TX interval.      Assessment & Plan   Assessment:  Ms. Xavier Delacruz is a 24 y.o. female who is  and currently at 19w5d weeks gestational age with a male fetus.  A fetal echocardiogram was performed today for  pre-gestational diabetes.     Fetal echocardiogram today demonstrated grossly normal fetal cardiac anatomy, qualitatively normal fetal cardiac function, normal fetal heart rhythm, and no evidence of in utero congestive heart failure or hydrops fetalis.      I reviewed the results of today's evaluation, including the findings of the fetal echocardiogram, with Ms. Xavier Delacruz in detail.  I explained that congenital heart disease is more common in the offspring of women who have pregestational diabetes.  There is a nearly 5-fold increased risk when compared with the general population to 3-5%.  There is a higher relative risk noted for specific cardiac defects including including 6.22 for heterotaxy, 4.72 for truncus arteriosus, 2.85 for transposition of the great arteries (d-TGA), and 18.24 for single-ventricle defects. Several studies indicate that lack of preconceptional glycemic control, as evidenced by elevation in serum hemoglobin A1C (HbA1c) levels >8.5% in the first trimester, is associated with an increase in all congenital malformations, whereas strict glycemic control before conception and during pregnancy reduces risk to a level comparable to that in the nondiabetic population. Additional studies, however, have suggested that there is no threshold HbA1c value that increases risk for fetal CHD.  In a study of 3 different diabetic populations, HbA1c values slightly above the normal range (mean, 6.4%) were associated with a significantly increased risk of cardiac malformation of 2.5% to 6.1% in offsprings. Therefore, it appears that although the risk may be highest in those with HbA1c levels >8.5%, all pregnancies of pregestational diabetic women are at some increased risk.     I discussed limitations of the technology of fetal echocardiography with Ms. Xavier Delacruz in detail.  I explained that fetal echocardiography cannot exclude all forms of congenital heart disease.  Fetal  echocardiography may be insensitive to some defects of atrial and ventricular septation, minor valvular abnormalities, partial anomalous pulmonary venous return, and coarctation of the aorta.  In addition, normal fetal echocardiogram does not ensure that the fetal ductus arteriosus or foramen ovale will close.      Fetuses may develop ventricular hypertrophy late in gestation in the presence of poorly controlled maternal gestational or pregestational DM, and the degree of hypertrophy has been shown to be related to glycemic control.  In women with HbA1c levels <6% in the second half of pregnancy, the effects are mild, so repeat fetal echocardiogram is not recommended.  If HbA1c levels are >6%, blood sugars are poorly controlled, or there is concern for ventricular hypertrophy on subsequent obstetric ultrasounds, a fetal echocardiogram should be repeated in the third trimester to assess for ventricular hypertrophy.  A pediatric cardiology consult with post-charles echocardiogram should be considered.  I emphasized the importance of good blood sugar control for the prevention of fetal ventricular hypertrophy in the third trimester.      Recommendations:  No changes to prenatal care.    Repeat fetal echocardiogram at 30-32 weeks gestational age if there are concerns regarding poorly controlled diabetes (Hgb A1C >6.0, elevated blood glucose levels, ventricular hypertrophy on obstetric ultrasound), or if other concerns arise.      Triage code 0:         No changes to delivery planning.  Delivery per obstetrics at patient´s preferred hospital.  Standard  care per  team.        If there are concerns regarding poorly controlled diabetes (Hgb A1C >6.0) in the third trimester consider a pediatric cardiology consult after birth for consideration of post- echocardiogram.    I spent greater than 60 minutes in performance of this consultation, of which greater than 50% was related to coordination of care or  counseling.    It was a pleasure to see Ms. Xavier Delacruz today.  If you have any questions or concerns regarding this evaluation, do not hesitate to contact me.       Beth Starks MD, FACC, FAAP   of Pediatrics  Division of Pediatric Cardiology  [place] [address]  Phone: [phone]  Fax: [phone]  e-mail: [email or web address]         [1]  Patient Active Problem List  Diagnosis   • Myopia, bilateral   • Food insecurity   • Type 2 diabetes mellitus with stage 1 chronic kidney disease, with long-term current use of insulin (Multi)   • Chronic hypertension in pregnancy (HHS-HCC)   • Gluteal abscess   • Chlamydia infection affecting pregnancy in first trimester (HHS-HCC)   • Rubella non-immune status, antepartum (HHS-HCC)   • 18 weeks gestation of pregnancy (HHS-HCC)   [2]  No past surgical history on file.

## 2025-05-12 NOTE — PROGRESS NOTES
[facility]  [facility]  Division of Pediatric Cardiology  [facility]  [address], 1st [address], [place] [address]  Tel: [phone], Fax [phone]      Obstetrician: Dr. Otto Redman     Xavier Delacruz was seen at the request of Dr. Otto Redman for pre-gestational diabetes.  Records were reviewed, and a summary of those records is integrated within the history of present illness.  A report with my findings is being sent via written or electronic means to the referring physician with my recommendations.    Accompanied by: sister  History obtained from: patient    History of Presentation   History of Present Illness:   Xavier Delacruz is a 24 y.o. female presenting for initial prenatal cardiology consultation and fetal echocardiogram for pre-gestational diabetes.  She is  and approximately 19w5d weeks pregnant (Last menstrual period 2024), Estimated Date of Delivery: 10/1/25) with a Male fetus.  Her obstetric history is significant for one  vaginal delivery, induced at 35 weeks for gestational hypertension.  Complications of her current pregnancy include obesity, pre-gestational diabetes, and hypertension (HbA1c 15, BMI 44).  Ms. Xavier Delacruz had a normal first trimester screen.  Her level 2 obstetric ultrasound for anatomy was performed at 18 weeks gestational age and was incomplete due to gestational age, maternal acoustic properties and fetal lie, although no malformations were identified.  She has undergone cell free fetal DNA testing and it was normal.  She has not had invasive genetic testing such as amniocentesis or chorionic villous sampling.  This pregnancy was not the result of in vitro fertilization.  She was not using potentially teratogenic medication at the time of conception.  There have been no other complications  "of this pregnancy.  Ms. Xavier Delacruz plans to deliver her baby at [facility].    Ms. Xavier Delacruz feels well today.  She denies any shortness of breath, abdominal cramping, contractions, bleeding, or swelling of the extremities.  She notes frequent fetal movement.      Medical History     Medical Conditions:  Problem List[1]  Past Surgeries:  Surgical History[2]    Current Outpatient Medications   Medication Instructions    alcohol swabs pads, medicated Use 1, up to 5 times a day    aspirin 162 mg, oral, Daily    blood pressure test kit-large kit 1 each, miscellaneous, Daily    blood pressure test kit-large kit Please take your blood pressure twice daily.    blood sugar diagnostic (Blood Glucose Test) strip Use 1 strip 4-6 times a day during pregnancy    docusate sodium (COLACE) 100 mg, oral, 2 times daily PRN    FreeStyle Leobardo 3 Sensor device Use asn directed to measure glucose in real time changing every 14 days    glucagon (Glucagon Emergency Kit, human,) 1 mg injection Use for treatment of severe hypoglycemia    glucagon 1 mg, intramuscular, Every 15 min PRN    insulin glargine (Lantus Solostar U-100 Insulin) 100 unit/mL (3 mL) pen Inject 10 units subcutaneously at bedtime. May increase to 50 units total per day during pregnancy as needed.    insulin lispro (HumaLOG KwikPen Insulin) 100 unit/mL injection Inject 6 units subcutaneously prior to meals. May increase to 30 units total per day during pregnancy as needed.    lancets 33 gauge misc Use 1 lancet 4-6 times per day during pregnancy    metroNIDAZOLE (FLAGYL) 500 mg, oral, 2 times daily    pen needle, diabetic (Pen Needle) 32 gauge x 5/32\" needle Use 1 per injection, up to 5 times a day    prenatal vitamin, iron-folic, 27 mg iron-800 mcg folic acid tablet 1 tablet, oral, Daily      Allergies:  Patient has no known allergies.    Social History:  Patient lives with her sister, daughter and step parents.  Occupation: No " "occupation  Smoking: None  Alcohol: None  Drug Use: None  She wears a seatbelt while in the car. She denies any verbal, sexual or physical abuse.     Cardiac Family History (for patient and father of baby):  There is no history of congenital heart disease.  There is no history of early sudden/unexplained death including SIDS and drowning.  There is no history of cardiomyopathy of any type or heart transplant.  There is no history of arrhythmias/pacemaker/defibrillator or arrhythmia syndromes, including Long QT syndrome, Alfredo-Parkinson-White syndrome or Brugada syndrome.  There is no history of heart attack or stroke before the age of 55 years in a close family member.  There is no history of Marfan syndrome or aortic aneurysm.  There is no history of deafness.  There is no history of syncope/fainting.  There is no history of high blood pressure or high cholesterol.  There is no history of DiGeorge Syndrome (22q11).    Physical Examination     /72 (BP Location: Right arm, Patient Position: Sitting)   Pulse 84   Ht 1.743 m (5' 8.62\")   Wt 103 kg (227 lb 1.2 oz)   SpO2 99%   BMI 33.90 kg/m²     General: Alert, well-appearing and in no acute distress.    Abdomen: Soft, nontender, not distended. Gravid.  Extremities: No swelling or edema.  Neurologic / Psychiatric: Grossly intact without focal deficits.  Appropriate demeanor.      Results     Fetal Echocardiogram:    I ordered and interpreted a transabdominal fetal echocardiogram.  I performed a portion of the study myself.  The complete report is available under separate cover.  The results are summarized as follows:    Image quality: Good  Cardiac situs: Cardiac mass in the left chest.  Left ventricular apex points leftward.  Segmental anatomy: Atrio-ventricular concordance.  Ventriculo-arterial concordance.  Normally-related great arteries.  Superior vena cava: Normal connection to the right atrium.  Inferior vena cava: Normal connection to the right " atrium.  Pulmonary veins: At least one pulmonary vein connects normally to the left atrium.  Atrial septum: Patent foramen ovale, with flap valve bowing into the left atrium.  Tricuspid valve: Structurally normal.  No obvious stenosis or insufficiency.  Mitral valve: Structurally normal.  No obvious stenosis or insufficiency.  Right ventricle: Normal ventricular size, wall thickness, and systolic function (qualitative).  Left ventricle: Normal ventricular size, wall thickness, and systolic function (qualitative).  Interventricular septum: No obvious septal defect.  Aortic valve: Structurally normal.  No obvious stenosis or insufficiency.  Pulmonary valve: Structurally normal.  No obvious stenosis or insufficiency.  Pulmonary artery: Normal in size.  Ductal arch: Patent with right to left shunting.  Aortic arch: Left-sided.  Patent.  Pericardial effusion: None.  Umbilical arteries: Two umbilical arteries.  Normal arterial flow pattern.  Umbilical vein: Normal venous flow pattern.  Electrophysiology: Normal fetal heart rate and rhythm.  Normal mechanical VT interval.      Assessment & Plan   Assessment:  Ms. Xavier Delacruz is a 24 y.o. female who is  and currently at 19w5d weeks gestational age with a male fetus.  A fetal echocardiogram was performed today for pre-gestational diabetes.     Fetal echocardiogram today demonstrated grossly normal fetal cardiac anatomy, qualitatively normal fetal cardiac function, normal fetal heart rhythm, and no evidence of in utero congestive heart failure or hydrops fetalis.      I reviewed the results of today's evaluation, including the findings of the fetal echocardiogram, with Ms. Xavier Delacruz in detail.  I explained that congenital heart disease is more common in the offspring of women who have pregestational diabetes.  There is a nearly 5-fold increased risk when compared with the general population to 3-5%.  There is a higher relative risk noted for  specific cardiac defects including including 6.22 for heterotaxy, 4.72 for truncus arteriosus, 2.85 for transposition of the great arteries (d-TGA), and 18.24 for single-ventricle defects. Several studies indicate that lack of preconceptional glycemic control, as evidenced by elevation in serum hemoglobin A1C (HbA1c) levels >8.5% in the first trimester, is associated with an increase in all congenital malformations, whereas strict glycemic control before conception and during pregnancy reduces risk to a level comparable to that in the nondiabetic population. Additional studies, however, have suggested that there is no threshold HbA1c value that increases risk for fetal CHD.  In a study of 3 different diabetic populations, HbA1c values slightly above the normal range (mean, 6.4%) were associated with a significantly increased risk of cardiac malformation of 2.5% to 6.1% in offsprings. Therefore, it appears that although the risk may be highest in those with HbA1c levels >8.5%, all pregnancies of pregestational diabetic women are at some increased risk.     I discussed limitations of the technology of fetal echocardiography with Ms. Xavier Delacruz in detail.  I explained that fetal echocardiography cannot exclude all forms of congenital heart disease.  Fetal echocardiography may be insensitive to some defects of atrial and ventricular septation, minor valvular abnormalities, partial anomalous pulmonary venous return, and coarctation of the aorta.  In addition, normal fetal echocardiogram does not ensure that the fetal ductus arteriosus or foramen ovale will close.      Fetuses may develop ventricular hypertrophy late in gestation in the presence of poorly controlled maternal gestational or pregestational DM, and the degree of hypertrophy has been shown to be related to glycemic control.  In women with HbA1c levels <6% in the second half of pregnancy, the effects are mild, so repeat fetal echocardiogram is not  recommended.  If HbA1c levels are >6%, blood sugars are poorly controlled, or there is concern for ventricular hypertrophy on subsequent obstetric ultrasounds, a fetal echocardiogram should be repeated in the third trimester to assess for ventricular hypertrophy.  A pediatric cardiology consult with post-charles echocardiogram should be considered.  I emphasized the importance of good blood sugar control for the prevention of fetal ventricular hypertrophy in the third trimester.      Recommendations:  No changes to prenatal care.    Repeat fetal echocardiogram at 30-32 weeks gestational age if there are concerns regarding poorly controlled diabetes (Hgb A1C >6.0, elevated blood glucose levels, ventricular hypertrophy on obstetric ultrasound), or if other concerns arise.      Triage code 0:         No changes to delivery planning.  Delivery per obstetrics at patient´s preferred hospital.  Standard  care per  team.        If there are concerns regarding poorly controlled diabetes (Hgb A1C >6.0) in the third trimester consider a pediatric cardiology consult after birth for consideration of post-charles echocardiogram.    I spent greater than 60 minutes in performance of this consultation, of which greater than 50% was related to coordination of care or counseling.    It was a pleasure to see Ms. Xavier Delacruz today.  If you have any questions or concerns regarding this evaluation, do not hesitate to contact me.       Beth Starks MD, FACC, FAAP   of Pediatrics  Division of Pediatric Cardiology  [facility] and [place] [address]  Phone: [phone]  Fax: [phone]  e-mail: [email or web address]         [1]   Patient Active Problem List  Diagnosis    Myopia, bilateral    Food insecurity    Type 2 diabetes mellitus with stage 1 chronic kidney disease, with long-term current use of insulin  (Multi)    Chronic hypertension in pregnancy ([facility])    Gluteal abscess    Chlamydia infection affecting pregnancy in first trimester ([facility])    Rubella non-immune status, antepartum ([facility])    18 weeks gestation of pregnancy ([facility])   [2] No past surgical history on file.

## 2025-05-12 NOTE — PATIENT INSTRUCTIONS
Congenital heart disease is more common in the children of women who have diabetes when they get pregnant.  There is a nearly 5-fold increased risk when compared with the general population to 3-5%.  This is why it is important for us to look at  your fetus' heart before birth.  On fetal echocardiogram today the structure, function, and rhythm of your fetus' heart were normal (see below).  There are some limitations to fetal echocardiogram as described below, and because it is not a perfect test it is important to know that we cannot rule out all possible heart problems.  We will communicate these results with your obstetrician.    It is important to know that poorly controlled diabetes (hemoglobin A1C greater than 6%) in the third trimester of pregnancy carries an increased risk of the fetus' heart muscle becoming thick.  This usually gets better on its own over time, but can be significant and cause the baby problems after birth.  Thus it will be very important to keep your diabetes under good control for the rest of your pregnancy.  If there are concerns about your blood sugar control or about your fetus´ heart looking thick on obstetric ultrasounds, we may see you back in at 30-32 weeks gestational age.  We would also be happy to see your baby after birth if there are any concerns.    It was a pleasure to see you today.  If you have any questions or concerns regarding this evaluation, please do not hesitate to contact me.    Beth Starks MD   of Pediatrics  Division of Pediatric Cardiology  [place] [id number]  Phone: [phone]  Fax: [phone]  e-mail: [email or web address]      [email or web address]    Normal Fetal Echocardiogram    Today you had an ultrasound of your fetus' heart (fetal echocardiogram).  Based on all of the  pictures taken today, the heart appears normal and is developing as expected for this point in your pregnancy.   Therefore, no further testing is recommended at this time.  Despite today´s normal findings, it is impossible to rule out all heart problems before birth.  This is partially because the fetus´ heart is so small, and our machine´s technology can only see structures down to a certain size.  It is also because blood flow in a fetus is different and more complicated than blood flow after birth.    Briefly:  ° Fetuses get oxygen from the placenta instead of their lungs.  High-oxygen (red) blood from the placenta comes back to the right side of the fetus´ heart.  ° Red blood crosses to the left side of the heart through a hole between the upper chambers of the heart, the foramen ovale.  The foramen ovale lets the red blood flow to the body.  ° Low-oxygen (blue) blood stays on the right side of the heart.  After leaving the heart, the blue blood flows through a special blood vessel known as the ductus arteriosus.  The ductus arteriosus allows the blue blood to bypass the lungs and return to the placenta to get oxygen.  ° After birth the foramen ovale and ductus arteriosus should close, but sometimes they do not.  It is impossible to predict in which children these structures will remain open.    ° Thus, on fetal echo we cannot rule out that your baby will have a patent foramen ovale (PFO) or patent ductus arteriosus (PDA) after birth.    In addition, fetal echocardiography can miss other heart defects including:  ° Small or medium-sized holes between the upper or lower heart chambers.  ° Minor abnormalities of the heart valves.  ° Narrowing of the main artery that goes to the body (coarctation of the aorta).  ° Other rare abnormalities of the veins or arteries.    If any of these defects are present, there should be findings after birth that will alert your child´s doctor that there is a problem and prompt  further evaluation.  After delivery, if your pediatrician has any concerns, your child's heart can be reevaluated at that time.

## 2025-05-15 ENCOUNTER — DOCUMENTATION (OUTPATIENT)
Dept: OBSTETRICS AND GYNECOLOGY | Facility: HOSPITAL | Age: 25
End: 2025-05-15
Payer: MEDICARE

## 2025-05-26 PROBLEM — Z3A.21 21 WEEKS GESTATION OF PREGNANCY (HHS-HCC): Status: ACTIVE | Noted: 2025-02-11

## 2025-05-26 NOTE — ASSESSMENT & PLAN NOTE
-up to date on prenatal care   - anatomy US incomplete, for repeat 28wks  -q4 week growths planned  - testing to start at 32wks

## 2025-05-26 NOTE — PROGRESS NOTES
"[facility] Follow-up  2025   12:37 PM     SUBJECTIVE    HPI: Xavier Delacruz \"Chakari\" is a 24 y.o.  at 21.6wga here for RPNV. Denies contractions, bleeding, or LOF. Reports normal fetal movement. Denies HA, VA changes, CP, SOB, RUQ pain.    Not always checking blood pressures, doesn't have log to keep track.     Pt feels like she has some high days and low days, states she's trying to have more low days. Reports she sometimes forgets to take her meal-time and takes it an hour after eating. Reports she almost always takes her long-acting.   Trying to cut out juice, minimize snacking. Also giving herself 10u with snacking.     Doesn't usually notice when she's alarming low, only felt it once when her CGM read 24. Is not checking POCT at the same time, out of lancets/test strips.    Yesterday -   Breakfast - oatmeal, cereal (fruit loops)   Lunch - 2 hot dog   Reports having ribs, greens, macaroni cheese, macaroni salad, dinner roll.     Pregnancy c/b  - T2DM - on lantus 40u qAM, lispro 30u TID. Last A1c 15% (1st tri)  - cHTN - no meds, P:C 0.47  - hx chlamydia in 1st trim - neg DALILA   - BMI 41  - Hx gluteal abscess - s/p I&D on , , course of augmentin       OBJECTIVE    Visit Vitals  OB Status Pregnant   Smoking Status Never      General - in no acute distress, resting comfortably  CV - regular rate   Resp - non labored on room air, normal effort   Abd - gravid, soft, no obvious masses   Skin - small 2cm hyperpigmented lesion lateral to belly button  FHT: obtained on US today    ASSESSMENT & PLAN    Xavier Delacruz \"Chakari\" is a 24 y.o.  at 21.6wga here for the following concerns we addressed today:    Assessment & Plan  Type 2 diabetes mellitus with stage 1 chronic kidney disease, with long-term current use of insulin (Multi)  -Patient is currently wearing continuous glucose monitor.  Report reviewed today including at least 72 hours of glucose data, based on this review adjustments " were made as noted.  -Reviewed CGM as above, mostly above goal in 200-300s, intermittently hypoglycemic with overcorrection   -Current regimen: Lantus 40u qAM & Lispro 30u with meals   -Changes made in regimen today: Lantus 50 qAM, Lispro 40u with meals    -Needs maternal echo , scheduled   -s/p fetal echo, reviewed from  and normal  -HgbA1C at 15.0% in first trimester. HgbA1C q trimester, will update today    Orders:    Hemoglobin A1C; Future    Chronic hypertension in pregnancy ([facility])  -no meds , asymptomatic   -BP today normotensive  - logs given for home monitoring        21 weeks gestation of pregnancy ([facility])  -up to date on prenatal care   - anatomy US incomplete, for repeat 28wks  -q4 week growths planned  - testing to start at 32wks       Type 2 diabetes mellitus affecting pregnancy in first trimester, antepartum ([facility])    Orders:    lancets 33 gauge misc; Use 1 lancet 4-6 times per day during pregnancy    blood sugar diagnostic (Blood Glucose Test); Use 1 strip 4-6 times a day during pregnancy    High risk multigravida, second trimester ([facility])    Orders:    aspirin 81 mg EC tablet; Take 2 tablets (162 mg) by mouth once daily.      RTC in 3 weeks    Patient seen and evaluated with Dr. Feroz Starr MD  PGY3, Obstetrics and Gynecology

## 2025-05-26 NOTE — ASSESSMENT & PLAN NOTE
-Patient is currently wearing continuous glucose monitor.  Report reviewed today including at least 72 hours of glucose data, based on this review adjustments were made as noted.  -Reviewed CGM as above, mostly above goal in 200-300s, intermittently hypoglycemic with overcorrection   -Current regimen: Lantus 40u qAM & Lispro 30u with meals   -Changes made in regimen today: Lantus 50 qAM, Lispro 40u with meals    -Needs maternal echo , scheduled 6/13  -s/p fetal echo, reviewed from 5/12 and normal  -HgbA1C at 15.0% in first trimester. HgbA1C q trimester, will update today    Orders:    Hemoglobin A1C; Future

## 2025-05-27 ENCOUNTER — HOSPITAL ENCOUNTER (OUTPATIENT)
Dept: RADIOLOGY | Facility: CLINIC | Age: 25
Discharge: HOME | End: 2025-05-27
Payer: MEDICARE

## 2025-05-27 ENCOUNTER — ROUTINE PRENATAL (OUTPATIENT)
Dept: MATERNAL FETAL MEDICINE | Facility: CLINIC | Age: 25
End: 2025-05-27
Payer: MEDICARE

## 2025-05-27 ENCOUNTER — PHARMACY VISIT (OUTPATIENT)
Dept: PHARMACY | Facility: CLINIC | Age: 25
End: 2025-05-27
Payer: COMMERCIAL

## 2025-05-27 VITALS
HEART RATE: 90 BPM | DIASTOLIC BLOOD PRESSURE: 80 MMHG | WEIGHT: 226.44 LBS | SYSTOLIC BLOOD PRESSURE: 121 MMHG | BODY MASS INDEX: 33.81 KG/M2

## 2025-05-27 DIAGNOSIS — A74.9 CHLAMYDIA INFECTION AFFECTING PREGNANCY IN FIRST TRIMESTER (HHS-HCC): ICD-10-CM

## 2025-05-27 DIAGNOSIS — O24.912: ICD-10-CM

## 2025-05-27 DIAGNOSIS — Z28.39 RUBELLA NON-IMMUNE STATUS, ANTEPARTUM (HHS-HCC): ICD-10-CM

## 2025-05-27 DIAGNOSIS — E11.22 TYPE 2 DIABETES MELLITUS WITH STAGE 1 CHRONIC KIDNEY DISEASE, WITH LONG-TERM CURRENT USE OF INSULIN (MULTI): ICD-10-CM

## 2025-05-27 DIAGNOSIS — O98.811 CHLAMYDIA INFECTION AFFECTING PREGNANCY IN FIRST TRIMESTER (HHS-HCC): ICD-10-CM

## 2025-05-27 DIAGNOSIS — O09.899 RUBELLA NON-IMMUNE STATUS, ANTEPARTUM (HHS-HCC): ICD-10-CM

## 2025-05-27 DIAGNOSIS — O36.80X0 PREGNANCY, LOCATION UNKNOWN (HHS-HCC): ICD-10-CM

## 2025-05-27 DIAGNOSIS — Z79.4 TYPE 2 DIABETES MELLITUS WITH STAGE 1 CHRONIC KIDNEY DISEASE, WITH LONG-TERM CURRENT USE OF INSULIN (MULTI): ICD-10-CM

## 2025-05-27 DIAGNOSIS — Z3A.21 21 WEEKS GESTATION OF PREGNANCY (HHS-HCC): Primary | ICD-10-CM

## 2025-05-27 DIAGNOSIS — O10.919 HTN IN PREGNANCY, CHRONIC (HHS-HCC): ICD-10-CM

## 2025-05-27 DIAGNOSIS — O09.42 HIGH RISK MULTIGRAVIDA, SECOND TRIMESTER (HHS-HCC): ICD-10-CM

## 2025-05-27 DIAGNOSIS — O24.111 TYPE 2 DIABETES MELLITUS AFFECTING PREGNANCY IN FIRST TRIMESTER, ANTEPARTUM (HHS-HCC): ICD-10-CM

## 2025-05-27 DIAGNOSIS — O10.919 CHRONIC HYPERTENSION IN PREGNANCY (HHS-HCC): ICD-10-CM

## 2025-05-27 DIAGNOSIS — N18.1 TYPE 2 DIABETES MELLITUS WITH STAGE 1 CHRONIC KIDNEY DISEASE, WITH LONG-TERM CURRENT USE OF INSULIN (MULTI): ICD-10-CM

## 2025-05-27 LAB — GLUCOSE BLD MANUAL STRIP-MCNC: 229 MG/DL (ref 74–99)

## 2025-05-27 PROCEDURE — 99214 OFFICE O/P EST MOD 30 MIN: CPT | Performed by: OBSTETRICS & GYNECOLOGY

## 2025-05-27 PROCEDURE — 82947 ASSAY GLUCOSE BLOOD QUANT: CPT | Performed by: OBSTETRICS & GYNECOLOGY

## 2025-05-27 PROCEDURE — RXMED WILLOW AMBULATORY MEDICATION CHARGE

## 2025-05-27 PROCEDURE — 99214 OFFICE O/P EST MOD 30 MIN: CPT | Mod: GC | Performed by: OBSTETRICS & GYNECOLOGY

## 2025-05-27 PROCEDURE — 76816 OB US FOLLOW-UP PER FETUS: CPT

## 2025-05-27 PROCEDURE — RXOTC WILLOW AMBULATORY OTC CHARGE

## 2025-05-27 PROCEDURE — 76816 OB US FOLLOW-UP PER FETUS: CPT | Performed by: OBSTETRICS & GYNECOLOGY

## 2025-05-27 RX ORDER — ASPIRIN 81 MG/1
162 TABLET ORAL DAILY
Qty: 60 TABLET | Refills: 11 | Status: SHIPPED | OUTPATIENT
Start: 2025-05-27 | End: 2026-05-27

## 2025-05-27 RX ORDER — IBUPROFEN 200 MG
CAPSULE ORAL
Qty: 200 EACH | Refills: 3 | Status: SHIPPED | OUTPATIENT
Start: 2025-05-27

## 2025-05-27 RX ORDER — LANCETS 28 GAUGE
EACH MISCELLANEOUS
Qty: 200 EACH | Refills: 3 | Status: SHIPPED | OUTPATIENT
Start: 2025-05-27

## 2025-05-27 ASSESSMENT — PAIN SCALES - GENERAL: PAINLEVEL_OUTOF10: 0 - NO PAIN

## 2025-05-27 ASSESSMENT — PAIN - FUNCTIONAL ASSESSMENT: PAIN_FUNCTIONAL_ASSESSMENT: 0-10

## 2025-05-28 LAB
EST. AVERAGE GLUCOSE BLD GHB EST-MCNC: 223 MG/DL
EST. AVERAGE GLUCOSE BLD GHB EST-SCNC: 12.4 MMOL/L
HBA1C MFR BLD: 9.4 %

## 2025-06-06 ENCOUNTER — APPOINTMENT (OUTPATIENT)
Dept: PODIATRY | Facility: CLINIC | Age: 25
End: 2025-06-06
Payer: MEDICARE

## 2025-06-11 ENCOUNTER — APPOINTMENT (OUTPATIENT)
Dept: OBSTETRICS AND GYNECOLOGY | Facility: CLINIC | Age: 25
End: 2025-06-11
Payer: MEDICARE

## 2025-06-13 ENCOUNTER — HOSPITAL ENCOUNTER (OUTPATIENT)
Dept: CARDIOLOGY | Facility: CLINIC | Age: 25
Discharge: HOME | End: 2025-06-13
Payer: MEDICARE

## 2025-06-13 DIAGNOSIS — N18.1 TYPE 2 DIABETES MELLITUS WITH STAGE 1 CHRONIC KIDNEY DISEASE, WITH LONG-TERM CURRENT USE OF INSULIN (MULTI): ICD-10-CM

## 2025-06-13 DIAGNOSIS — R06.83 SNORING: ICD-10-CM

## 2025-06-13 DIAGNOSIS — Z79.4 TYPE 2 DIABETES MELLITUS WITH STAGE 1 CHRONIC KIDNEY DISEASE, WITH LONG-TERM CURRENT USE OF INSULIN (MULTI): ICD-10-CM

## 2025-06-13 DIAGNOSIS — E11.22 TYPE 2 DIABETES MELLITUS WITH STAGE 1 CHRONIC KIDNEY DISEASE, WITH LONG-TERM CURRENT USE OF INSULIN (MULTI): ICD-10-CM

## 2025-06-13 DIAGNOSIS — Z13.6 ENCOUNTER FOR SCREENING FOR CARDIOVASCULAR DISORDERS: ICD-10-CM

## 2025-06-13 LAB
AORTIC VALVE MEAN GRADIENT: 5 MMHG
AORTIC VALVE PEAK VELOCITY: 1.65 M/S
AV PEAK GRADIENT: 11 MMHG
AVA (PEAK VEL): 2.45 CM2
AVA (VTI): 2.86 CM2
EJECTION FRACTION: 65 %
LEFT ATRIUM VOLUME AREA LENGTH INDEX BSA: 18.3 ML/M2
LEFT VENTRICLE INTERNAL DIMENSION DIASTOLE: 4.5 CM (ref 3.5–6)
LEFT VENTRICULAR OUTFLOW TRACT DIAMETER: 2.04 CM
MITRAL VALVE E/A RATIO: 1.36
RIGHT VENTRICLE FREE WALL PEAK S': 12 CM/S
TRICUSPID ANNULAR PLANE SYSTOLIC EXCURSION: 3 CM

## 2025-06-13 PROCEDURE — 93306 TTE W/DOPPLER COMPLETE: CPT

## 2025-06-13 PROCEDURE — 93306 TTE W/DOPPLER COMPLETE: CPT | Performed by: INTERNAL MEDICINE

## 2025-06-17 ENCOUNTER — APPOINTMENT (OUTPATIENT)
Dept: MATERNAL FETAL MEDICINE | Facility: CLINIC | Age: 25
End: 2025-06-17
Payer: MEDICARE

## 2025-06-17 ENCOUNTER — APPOINTMENT (OUTPATIENT)
Dept: RADIOLOGY | Facility: CLINIC | Age: 25
End: 2025-06-17
Payer: MEDICARE

## 2025-06-24 ENCOUNTER — TELEPHONE (OUTPATIENT)
Dept: OBSTETRICS AND GYNECOLOGY | Facility: CLINIC | Age: 25
End: 2025-06-24
Payer: MEDICARE

## 2025-06-24 PROCEDURE — RXMED WILLOW AMBULATORY MEDICATION CHARGE

## 2025-06-24 NOTE — TELEPHONE ENCOUNTER
Follow up call to pt and VM left with her to call back- may see my chart messages - RN unable to reach pt directly.

## 2025-06-25 ENCOUNTER — APPOINTMENT (OUTPATIENT)
Dept: OBSTETRICS AND GYNECOLOGY | Facility: CLINIC | Age: 25
End: 2025-06-25
Payer: MEDICARE

## 2025-06-27 ENCOUNTER — PHARMACY VISIT (OUTPATIENT)
Dept: PHARMACY | Facility: CLINIC | Age: 25
End: 2025-06-27
Payer: COMMERCIAL

## 2025-06-30 ENCOUNTER — PHARMACY VISIT (OUTPATIENT)
Dept: PHARMACY | Facility: CLINIC | Age: 25
End: 2025-06-30
Payer: COMMERCIAL

## 2025-07-01 ENCOUNTER — APPOINTMENT (OUTPATIENT)
Dept: MATERNAL FETAL MEDICINE | Facility: CLINIC | Age: 25
End: 2025-07-01
Payer: MEDICARE

## 2025-07-02 ENCOUNTER — PHARMACY VISIT (OUTPATIENT)
Dept: PHARMACY | Facility: CLINIC | Age: 25
End: 2025-07-02
Payer: COMMERCIAL

## 2025-07-07 PROBLEM — Z3A.27 27 WEEKS GESTATION OF PREGNANCY (HHS-HCC): Status: ACTIVE | Noted: 2025-02-11

## 2025-07-08 ENCOUNTER — APPOINTMENT (OUTPATIENT)
Dept: MATERNAL FETAL MEDICINE | Facility: CLINIC | Age: 25
End: 2025-07-08
Payer: MEDICARE

## 2025-07-09 ENCOUNTER — APPOINTMENT (OUTPATIENT)
Dept: RADIOLOGY | Facility: CLINIC | Age: 25
End: 2025-07-09
Payer: MEDICARE

## 2025-07-09 ENCOUNTER — APPOINTMENT (OUTPATIENT)
Dept: OBSTETRICS AND GYNECOLOGY | Facility: CLINIC | Age: 25
End: 2025-07-09
Payer: MEDICARE

## 2025-07-10 ENCOUNTER — PATIENT MESSAGE (OUTPATIENT)
Dept: MATERNAL FETAL MEDICINE | Facility: CLINIC | Age: 25
End: 2025-07-10
Payer: MEDICARE

## 2025-07-10 PROCEDURE — RXMED WILLOW AMBULATORY MEDICATION CHARGE

## 2025-07-14 ENCOUNTER — PHARMACY VISIT (OUTPATIENT)
Dept: PHARMACY | Facility: CLINIC | Age: 25
End: 2025-07-14
Payer: COMMERCIAL

## 2025-07-15 ENCOUNTER — APPOINTMENT (OUTPATIENT)
Dept: MATERNAL FETAL MEDICINE | Facility: CLINIC | Age: 25
End: 2025-07-15
Payer: MEDICARE

## 2025-07-16 ENCOUNTER — APPOINTMENT (OUTPATIENT)
Dept: PODIATRY | Facility: CLINIC | Age: 25
End: 2025-07-16
Payer: MEDICARE

## 2025-07-20 PROBLEM — Z3A.29 29 WEEKS GESTATION OF PREGNANCY (HHS-HCC): Status: ACTIVE | Noted: 2025-02-11

## 2025-07-20 PROBLEM — Z3A.19 19 WEEKS GESTATION OF PREGNANCY (HHS-HCC): Status: ACTIVE | Noted: 2025-02-11

## 2025-07-22 ENCOUNTER — PHARMACY VISIT (OUTPATIENT)
Dept: PHARMACY | Facility: CLINIC | Age: 25
End: 2025-07-22
Payer: COMMERCIAL

## 2025-07-22 ENCOUNTER — TELEPHONE (OUTPATIENT)
Dept: PRIMARY CARE | Facility: CLINIC | Age: 25
End: 2025-07-22

## 2025-07-22 ENCOUNTER — APPOINTMENT (OUTPATIENT)
Dept: MATERNAL FETAL MEDICINE | Facility: CLINIC | Age: 25
End: 2025-07-22
Payer: MEDICARE

## 2025-07-22 VITALS
SYSTOLIC BLOOD PRESSURE: 110 MMHG | WEIGHT: 211.25 LBS | HEART RATE: 114 BPM | BODY MASS INDEX: 31.54 KG/M2 | DIASTOLIC BLOOD PRESSURE: 77 MMHG

## 2025-07-22 DIAGNOSIS — O98.811 CHLAMYDIA INFECTION AFFECTING PREGNANCY IN FIRST TRIMESTER (HHS-HCC): ICD-10-CM

## 2025-07-22 DIAGNOSIS — K21.00 GASTROESOPHAGEAL REFLUX DISEASE WITH ESOPHAGITIS WITHOUT HEMORRHAGE: Primary | ICD-10-CM

## 2025-07-22 DIAGNOSIS — Z28.39 RUBELLA NON-IMMUNE STATUS, ANTEPARTUM (HHS-HCC): ICD-10-CM

## 2025-07-22 DIAGNOSIS — O09.899 RUBELLA NON-IMMUNE STATUS, ANTEPARTUM (HHS-HCC): ICD-10-CM

## 2025-07-22 DIAGNOSIS — E10.22 TYPE 1 DIABETES MELLITUS WITH DIABETIC CHRONIC KIDNEY DISEASE, UNSPECIFIED CKD STAGE: ICD-10-CM

## 2025-07-22 DIAGNOSIS — A74.9 CHLAMYDIA INFECTION AFFECTING PREGNANCY IN FIRST TRIMESTER (HHS-HCC): ICD-10-CM

## 2025-07-22 DIAGNOSIS — O24.111 TYPE 2 DIABETES MELLITUS AFFECTING PREGNANCY IN FIRST TRIMESTER, ANTEPARTUM (HHS-HCC): ICD-10-CM

## 2025-07-22 DIAGNOSIS — O10.919 CHRONIC HYPERTENSION IN PREGNANCY (HHS-HCC): ICD-10-CM

## 2025-07-22 DIAGNOSIS — Z3A.29 29 WEEKS GESTATION OF PREGNANCY (HHS-HCC): ICD-10-CM

## 2025-07-22 LAB — GLUCOSE BLD MANUAL STRIP-MCNC: 436 MG/DL (ref 74–99)

## 2025-07-22 PROCEDURE — 82947 ASSAY GLUCOSE BLOOD QUANT: CPT | Performed by: OBSTETRICS & GYNECOLOGY

## 2025-07-22 PROCEDURE — RXMED WILLOW AMBULATORY MEDICATION CHARGE

## 2025-07-22 PROCEDURE — 99214 OFFICE O/P EST MOD 30 MIN: CPT | Performed by: OBSTETRICS & GYNECOLOGY

## 2025-07-22 PROCEDURE — 90471 IMMUNIZATION ADMIN: CPT | Performed by: OBSTETRICS & GYNECOLOGY

## 2025-07-22 PROCEDURE — 99214 OFFICE O/P EST MOD 30 MIN: CPT | Mod: 25 | Performed by: OBSTETRICS & GYNECOLOGY

## 2025-07-22 RX ORDER — INSULIN GLARGINE 100 [IU]/ML
INJECTION, SOLUTION SUBCUTANEOUS
Qty: 15 ML | Refills: 3 | Status: SHIPPED | OUTPATIENT
Start: 2025-07-22

## 2025-07-22 RX ORDER — BLOOD-GLUCOSE SENSOR
EACH MISCELLANEOUS
Qty: 2 EACH | Refills: 11 | Status: SHIPPED | OUTPATIENT
Start: 2025-07-22

## 2025-07-22 RX ORDER — DEXTROSE 4 G
TABLET,CHEWABLE ORAL
Qty: 1 EACH | Refills: 1 | Status: SHIPPED | OUTPATIENT
Start: 2025-07-22

## 2025-07-22 RX ORDER — INSULIN LISPRO 100 [IU]/ML
INJECTION, SOLUTION INTRAVENOUS; SUBCUTANEOUS
Qty: 15 ML | Refills: 3 | Status: SHIPPED | OUTPATIENT
Start: 2025-07-22

## 2025-07-22 RX ORDER — FAMOTIDINE 20 MG/1
20 TABLET, FILM COATED ORAL 2 TIMES DAILY
Qty: 90 TABLET | Refills: 3 | Status: SHIPPED | OUTPATIENT
Start: 2025-07-22 | End: 2026-07-22

## 2025-07-22 ASSESSMENT — PAIN - FUNCTIONAL ASSESSMENT: PAIN_FUNCTIONAL_ASSESSMENT: 0-10

## 2025-07-22 ASSESSMENT — PAIN SCALES - GENERAL: PAINLEVEL_OUTOF10: 0 - NO PAIN

## 2025-07-22 NOTE — ASSESSMENT & PLAN NOTE
- Third trimester labs ordered   - Tdap given today in clinic     Orders:    Syphilis Screen with Reflex; Future    CBC Anemia Panel With Reflex, Pregnancy; Future    HIV 1/2 Antigen/Antibody Screen with Reflex to Confirmation; Future

## 2025-07-22 NOTE — PROGRESS NOTES
"MFM Follow-up  2025   2:31 PM     SUBJECTIVE    HPI: Xavier Delacruz \"Chakari\" is a 24 y.o.  at 29w6d here for RPNV. She denies any contractions, vaginal bleeding, or LOF. Reports normal fetal movement. Patient reports she is feeling well.     Pregnancy notables:    T2DM, currently on Lantus 50 qAM, Lispro 40u   cHTN, no meds BP monitoring at home      For her T2DM, she reports that she has not checked her sugars in weeks or taken her insulin the past couple of days. She says she ran out of supplies for her CGM and insulin, and she wanted more supplies. She denies any N/V or headaches. Her POCT glucose significantly elevated at 436, and her urine dip today in the office was significant for 4+ Ketones. She had some apple juice today prior to this appointment, but had not eaten anything else. We discussed how with significantly elevated glucose and 4+ ketones in her urine we worry about Diabetic Ketoacidosis and would recommend going to triage to have blood work completed to rule out DKA.     In terms of her cHTN. She is not currently on medications. She has not been checking BP lately. She has a cuff at home, but says she is always chasing after her toddler. We discussed how only need 5 minutes of rest prior to checking BP for an accurate reading. She denies any headaches, vision changes, SOB, CP, RUQ pain.      OBJECTIVE    Visit Vitals  /77   Pulse (!) 114   Wt 95.8 kg (211 lb 4 oz)   BMI 31.54 kg/m²   OB Status Pregnant   Smoking Status Never   BSA 2.15 m²        FHT: 159     POCT Glucose: 436    ASSESSMENT & PLAN    Xavier Delacruz \"Chakari\" is a 24 y.o.  at 29w6d here for the following concerns we addressed today:    Assessment & Plan  Chronic hypertension in pregnancy ([facility])  - No BP log today. Has not been checking BP at home. Normotensive today in the office at 110/77.  - Discussed how only 5 minutes of rest is needed prior to checking BP  - Not on meds   - Plan to " bring BP log and discuss blood pressure at next visit       Chlamydia infection affecting pregnancy in first trimester ([facility])  - Third trimester STI testing ordered        Rubella non-immune status, antepartum ([facility])  - Plan for MMR postpartum        29 weeks gestation of pregnancy ([facility])  - Third trimester labs ordered   - Tdap given today in clinic     Orders:    Syphilis Screen with Reflex; Future    CBC Anemia Panel With Reflex, Pregnancy; Future    HIV 1/2 Antigen/Antibody Screen with Reflex to Confirmation; Future    Gastroesophageal reflux disease with esophagitis without hemorrhage  - Patient reports having increase reflux at night when lying down   - Ordered Pepcid to help with GERD symptoms  Orders:    famotidine (Pepcid) 20 mg tablet; Take 1 tablet (20 mg) by mouth 2 times a day.    Type 2 diabetes mellitus affecting pregnancy in first trimester, antepartum ([facility])  - Not checking her blood glucose in the past weeks   - not been taking insulin for the past couple of days  - POCT glucose: 436, urine dip 4+ ketones   - Denies N/V, headaches today  - Recommended going to L&D triage to have lab work completed to rule out DKA in setting of significantly elevated POCT glucose and  ketonuria   - Ordered refills for her insulin and pen needles, her CGM, and glucometer  - Ordered qtrimester  A1C   - Scheduled for serial growth US 28, 32, 26 wga    - Plan for  testing starting at 32 wga    Orders:    insulin glargine (Lantus Solostar U-100 Insulin) 100 unit/mL (3 mL) pen; Inject 10 units subcutaneously at bedtime. May increase to 50 units total per day during pregnancy as needed.    insulin lispro (HumaLOG KwikPen Insulin) 100 unit/mL pen; Inject 6 units subcutaneously prior to meals. May increase to 30 units total per day during pregnancy as needed.    blood-glucose meter misc; Use for testing blood sugar fasting and 1 hour after each meal. 4-6 times per day during pregnancy    Hemoglobin A1C;  Future    Type 1 diabetes mellitus with diabetic chronic kidney disease, unspecified CKD stage    Orders:    FreeStyle Leobardo 3 Sensor device; Use asn directed to measure glucose in real time changing every 14 days      RTC in 2 weeks    Patient seen and evaluated with Dr. Ayan Flor MD   PGY-1 Obstetrics and Gynecology

## 2025-07-22 NOTE — ASSESSMENT & PLAN NOTE
- No BP log today. Has not been checking BP at home. Normotensive today in the office at 110/77.  - Discussed how only 5 minutes of rest is needed prior to checking BP  - Not on meds   - Plan to bring BP log and discuss blood pressure at next visit

## 2025-07-23 ENCOUNTER — APPOINTMENT (OUTPATIENT)
Dept: OBSTETRICS AND GYNECOLOGY | Facility: CLINIC | Age: 25
End: 2025-07-23
Payer: MEDICARE

## 2025-07-23 ENCOUNTER — HOSPITAL ENCOUNTER (OUTPATIENT)
Dept: RADIOLOGY | Facility: CLINIC | Age: 25
Discharge: HOME | End: 2025-07-23
Payer: MEDICARE

## 2025-07-23 DIAGNOSIS — O24.011 TYPE 1 DIABETES MELLITUS COMPLICATING PREGNANCY IN FIRST TRIMESTER, ANTEPARTUM (HHS-HCC): ICD-10-CM

## 2025-07-23 PROCEDURE — 76819 FETAL BIOPHYS PROFIL W/O NST: CPT | Performed by: STUDENT IN AN ORGANIZED HEALTH CARE EDUCATION/TRAINING PROGRAM

## 2025-07-23 PROCEDURE — 76816 OB US FOLLOW-UP PER FETUS: CPT | Performed by: STUDENT IN AN ORGANIZED HEALTH CARE EDUCATION/TRAINING PROGRAM

## 2025-07-23 PROCEDURE — 76816 OB US FOLLOW-UP PER FETUS: CPT

## 2025-07-23 PROCEDURE — 76819 FETAL BIOPHYS PROFIL W/O NST: CPT

## 2025-07-25 PROCEDURE — RXMED WILLOW AMBULATORY MEDICATION CHARGE

## 2025-07-28 PROBLEM — O98.813 CHLAMYDIA INFECTION AFFECTING PREGNANCY IN THIRD TRIMESTER (HHS-HCC): Status: ACTIVE | Noted: 2025-02-03

## 2025-07-28 PROBLEM — Z3A.30 30 WEEKS GESTATION OF PREGNANCY (HHS-HCC): Status: ACTIVE | Noted: 2025-02-11

## 2025-07-29 ENCOUNTER — APPOINTMENT (OUTPATIENT)
Dept: MATERNAL FETAL MEDICINE | Facility: CLINIC | Age: 25
End: 2025-07-29
Payer: MEDICARE

## 2025-07-29 ENCOUNTER — PHARMACY VISIT (OUTPATIENT)
Dept: PHARMACY | Facility: CLINIC | Age: 25
End: 2025-07-29
Payer: COMMERCIAL

## 2025-07-30 ENCOUNTER — APPOINTMENT (OUTPATIENT)
Dept: OBSTETRICS AND GYNECOLOGY | Facility: CLINIC | Age: 25
End: 2025-07-30
Payer: MEDICARE

## 2025-07-31 ENCOUNTER — PATIENT MESSAGE (OUTPATIENT)
Dept: MATERNAL FETAL MEDICINE | Facility: CLINIC | Age: 25
End: 2025-07-31
Payer: MEDICARE

## 2025-07-31 DIAGNOSIS — N18.1 TYPE 2 DIABETES MELLITUS WITH STAGE 1 CHRONIC KIDNEY DISEASE, WITH LONG-TERM CURRENT USE OF INSULIN (MULTI): ICD-10-CM

## 2025-07-31 DIAGNOSIS — E11.22 TYPE 2 DIABETES MELLITUS WITH STAGE 1 CHRONIC KIDNEY DISEASE, WITH LONG-TERM CURRENT USE OF INSULIN (MULTI): ICD-10-CM

## 2025-07-31 DIAGNOSIS — Z79.4 TYPE 2 DIABETES MELLITUS WITH STAGE 1 CHRONIC KIDNEY DISEASE, WITH LONG-TERM CURRENT USE OF INSULIN (MULTI): ICD-10-CM

## 2025-07-31 NOTE — TELEPHONE ENCOUNTER
Pt returned call. Verified that she has been taking:  Lantus 40 at bedtime  Lispro 30 with each meal and 10 with snacks    Denies episodes of hypoglycemia.   Willing to record sugars via the GDM FlowSheet - will order today, instructions to be sent via [employer].    Adjust insulin as follows:  Lantus 40 --> 50* at bedtime  Lispro 30 --> 35* with meals, 10 with snacks

## 2025-08-01 PROCEDURE — RXMED WILLOW AMBULATORY MEDICATION CHARGE

## 2025-08-04 ENCOUNTER — TELEPHONE (OUTPATIENT)
Dept: OBSTETRICS AND GYNECOLOGY | Facility: CLINIC | Age: 25
End: 2025-08-04
Payer: MEDICARE

## 2025-08-05 ENCOUNTER — APPOINTMENT (OUTPATIENT)
Dept: MATERNAL FETAL MEDICINE | Facility: CLINIC | Age: 25
End: 2025-08-05
Payer: MEDICARE

## 2025-08-06 ENCOUNTER — APPOINTMENT (OUTPATIENT)
Dept: OPHTHALMOLOGY | Facility: CLINIC | Age: 25
End: 2025-08-06
Payer: MEDICARE

## 2025-08-06 ENCOUNTER — APPOINTMENT (OUTPATIENT)
Dept: OBSTETRICS AND GYNECOLOGY | Facility: CLINIC | Age: 25
End: 2025-08-06
Payer: MEDICARE

## 2025-08-06 ENCOUNTER — PHARMACY VISIT (OUTPATIENT)
Dept: PHARMACY | Facility: CLINIC | Age: 25
End: 2025-08-06
Payer: COMMERCIAL

## 2025-08-07 ENCOUNTER — PATIENT MESSAGE (OUTPATIENT)
Dept: OBSTETRICS AND GYNECOLOGY | Facility: CLINIC | Age: 25
End: 2025-08-07
Payer: MEDICARE

## 2025-08-07 PROBLEM — Z3A.32 32 WEEKS GESTATION OF PREGNANCY (HHS-HCC): Status: ACTIVE | Noted: 2025-02-11

## 2025-08-12 ENCOUNTER — ROUTINE PRENATAL (OUTPATIENT)
Dept: MATERNAL FETAL MEDICINE | Facility: CLINIC | Age: 25
End: 2025-08-12
Payer: MEDICARE

## 2025-08-12 ENCOUNTER — LAB (OUTPATIENT)
Dept: LAB | Facility: HOSPITAL | Age: 25
End: 2025-08-12
Payer: MEDICARE

## 2025-08-12 ENCOUNTER — PROCEDURE VISIT (OUTPATIENT)
Dept: OBSTETRICS AND GYNECOLOGY | Facility: CLINIC | Age: 25
End: 2025-08-12
Payer: MEDICARE

## 2025-08-12 VITALS — SYSTOLIC BLOOD PRESSURE: 135 MMHG | DIASTOLIC BLOOD PRESSURE: 82 MMHG | BODY MASS INDEX: 3.02 KG/M2 | WEIGHT: 20.2 LBS

## 2025-08-12 DIAGNOSIS — E11.22 TYPE 2 DIABETES MELLITUS WITH STAGE 1 CHRONIC KIDNEY DISEASE, WITH LONG-TERM CURRENT USE OF INSULIN (MULTI): Primary | ICD-10-CM

## 2025-08-12 DIAGNOSIS — Z79.4 TYPE 2 DIABETES MELLITUS WITH STAGE 1 CHRONIC KIDNEY DISEASE, WITH LONG-TERM CURRENT USE OF INSULIN (MULTI): ICD-10-CM

## 2025-08-12 DIAGNOSIS — O10.919 CHRONIC HYPERTENSION IN PREGNANCY (HHS-HCC): ICD-10-CM

## 2025-08-12 DIAGNOSIS — N18.1 TYPE 2 DIABETES MELLITUS WITH STAGE 1 CHRONIC KIDNEY DISEASE, WITH LONG-TERM CURRENT USE OF INSULIN (MULTI): ICD-10-CM

## 2025-08-12 DIAGNOSIS — E11.22 TYPE 2 DIABETES MELLITUS WITH STAGE 1 CHRONIC KIDNEY DISEASE, WITH LONG-TERM CURRENT USE OF INSULIN (MULTI): ICD-10-CM

## 2025-08-12 DIAGNOSIS — O09.899 RUBELLA NON-IMMUNE STATUS, ANTEPARTUM (HHS-HCC): ICD-10-CM

## 2025-08-12 DIAGNOSIS — N18.1 TYPE 2 DIABETES MELLITUS WITH STAGE 1 CHRONIC KIDNEY DISEASE, WITH LONG-TERM CURRENT USE OF INSULIN (MULTI): Primary | ICD-10-CM

## 2025-08-12 DIAGNOSIS — O98.813 CHLAMYDIA INFECTION AFFECTING PREGNANCY IN THIRD TRIMESTER (HHS-HCC): ICD-10-CM

## 2025-08-12 DIAGNOSIS — A74.9 CHLAMYDIA INFECTION AFFECTING PREGNANCY IN THIRD TRIMESTER (HHS-HCC): ICD-10-CM

## 2025-08-12 DIAGNOSIS — Z28.39 RUBELLA NON-IMMUNE STATUS, ANTEPARTUM (HHS-HCC): ICD-10-CM

## 2025-08-12 DIAGNOSIS — Z3A.32 32 WEEKS GESTATION OF PREGNANCY (HHS-HCC): ICD-10-CM

## 2025-08-12 DIAGNOSIS — Z79.4 TYPE 2 DIABETES MELLITUS WITH STAGE 1 CHRONIC KIDNEY DISEASE, WITH LONG-TERM CURRENT USE OF INSULIN (MULTI): Primary | ICD-10-CM

## 2025-08-12 LAB
ERYTHROCYTE [DISTWIDTH] IN BLOOD BY AUTOMATED COUNT: 13.8 % (ref 11.5–14.5)
FERRITIN SERPL-MCNC: 20 NG/ML
GLUCOSE BLD MANUAL STRIP-MCNC: 299 MG/DL (ref 74–99)
HCT VFR BLD AUTO: 35.1 % (ref 36–46)
HGB BLD-MCNC: 10.5 G/DL (ref 12–16)
IRON SATN MFR SERPL: NORMAL %
IRON SERPL-MCNC: 54 UG/DL
MCH RBC QN AUTO: 23.4 PG (ref 26–34)
MCHC RBC AUTO-ENTMCNC: 29.9 G/DL (ref 32–36)
MCV RBC AUTO: 78 FL (ref 80–100)
NRBC BLD-RTO: 0 /100 WBCS (ref 0–0)
PLATELET # BLD AUTO: 307 X10*3/UL (ref 150–450)
RBC # BLD AUTO: 4.48 X10*6/UL (ref 4–5.2)
REFLEX ADDED, ANEMIA PANEL: NORMAL
TIBC SERPL-MCNC: NORMAL UG/DL
UIBC SERPL-MCNC: >450 UG/DL
WBC # BLD AUTO: 6.4 X10*3/UL (ref 4.4–11.3)

## 2025-08-12 PROCEDURE — 82947 ASSAY GLUCOSE BLOOD QUANT: CPT | Performed by: OBSTETRICS & GYNECOLOGY

## 2025-08-12 PROCEDURE — 85027 COMPLETE CBC AUTOMATED: CPT

## 2025-08-12 PROCEDURE — 83550 IRON BINDING TEST: CPT

## 2025-08-12 PROCEDURE — 82728 ASSAY OF FERRITIN: CPT

## 2025-08-12 PROCEDURE — 59025 FETAL NON-STRESS TEST: CPT | Performed by: OBSTETRICS & GYNECOLOGY

## 2025-08-12 PROCEDURE — 0501F PRENATAL FLOW SHEET: CPT | Performed by: OBSTETRICS & GYNECOLOGY

## 2025-08-13 ENCOUNTER — APPOINTMENT (OUTPATIENT)
Dept: OBSTETRICS AND GYNECOLOGY | Facility: CLINIC | Age: 25
End: 2025-08-13
Payer: MEDICARE

## 2025-08-13 LAB
C TRACH RRNA SPEC QL NAA+PROBE: NOT DETECTED
EST. AVERAGE GLUCOSE BLD GHB EST-MCNC: 286 MG/DL
EST. AVERAGE GLUCOSE BLD GHB EST-SCNC: 15.9 MMOL/L
HBA1C MFR BLD: 11.6 %
HIV 1+2 AB+HIV1 P24 AG SERPL QL IA: NORMAL
HIV 1+2 AB+HIV1 P24 AG SERPL QL IA: NORMAL
N GONORRHOEA RRNA SPEC QL NAA+PROBE: NOT DETECTED
QUEST GC CT AMPLIFIED (ALWAYS MESSAGE): NORMAL
T VAGINALIS RRNA SPEC QL NAA+PROBE: NOT DETECTED

## 2025-08-15 ENCOUNTER — HOSPITAL ENCOUNTER (OUTPATIENT)
Dept: RADIOLOGY | Facility: CLINIC | Age: 25
Discharge: HOME | End: 2025-08-15
Payer: MEDICARE

## 2025-08-15 DIAGNOSIS — Z79.4 TYPE 2 DIABETES MELLITUS WITH STAGE 1 CHRONIC KIDNEY DISEASE, WITH LONG-TERM CURRENT USE OF INSULIN (MULTI): ICD-10-CM

## 2025-08-15 DIAGNOSIS — E11.22 TYPE 2 DIABETES MELLITUS WITH STAGE 1 CHRONIC KIDNEY DISEASE, WITH LONG-TERM CURRENT USE OF INSULIN (MULTI): ICD-10-CM

## 2025-08-15 DIAGNOSIS — O10.919 CHRONIC HYPERTENSION IN PREGNANCY (HHS-HCC): ICD-10-CM

## 2025-08-15 DIAGNOSIS — N18.1 TYPE 2 DIABETES MELLITUS WITH STAGE 1 CHRONIC KIDNEY DISEASE, WITH LONG-TERM CURRENT USE OF INSULIN (MULTI): ICD-10-CM

## 2025-08-15 DIAGNOSIS — Z3A.32 32 WEEKS GESTATION OF PREGNANCY (HHS-HCC): ICD-10-CM

## 2025-08-15 LAB — T PALLIDUM AB SER QL IA: NEGATIVE

## 2025-08-15 PROCEDURE — 76816 OB US FOLLOW-UP PER FETUS: CPT

## 2025-08-15 PROCEDURE — 76819 FETAL BIOPHYS PROFIL W/O NST: CPT

## 2025-08-18 PROBLEM — Z3A.33 33 WEEKS GESTATION OF PREGNANCY (HHS-HCC): Status: ACTIVE | Noted: 2025-02-11

## 2025-08-19 ENCOUNTER — HOSPITAL ENCOUNTER (OUTPATIENT)
Dept: RADIOLOGY | Facility: CLINIC | Age: 25
Discharge: HOME | End: 2025-08-19
Payer: MEDICARE

## 2025-08-19 ENCOUNTER — ROUTINE PRENATAL (OUTPATIENT)
Dept: MATERNAL FETAL MEDICINE | Facility: CLINIC | Age: 25
End: 2025-08-19
Payer: MEDICARE

## 2025-08-19 VITALS
DIASTOLIC BLOOD PRESSURE: 78 MMHG | BODY MASS INDEX: 33.12 KG/M2 | WEIGHT: 221.8 LBS | SYSTOLIC BLOOD PRESSURE: 131 MMHG | HEART RATE: 92 BPM

## 2025-08-19 DIAGNOSIS — O36.80X0 PREGNANCY, LOCATION UNKNOWN (HHS-HCC): ICD-10-CM

## 2025-08-19 DIAGNOSIS — O10.919 CHRONIC HYPERTENSION IN PREGNANCY (HHS-HCC): ICD-10-CM

## 2025-08-19 DIAGNOSIS — O98.813 CHLAMYDIA INFECTION AFFECTING PREGNANCY IN THIRD TRIMESTER (HHS-HCC): ICD-10-CM

## 2025-08-19 DIAGNOSIS — E11.22 TYPE 2 DIABETES MELLITUS WITH STAGE 1 CHRONIC KIDNEY DISEASE, WITH LONG-TERM CURRENT USE OF INSULIN (MULTI): Primary | ICD-10-CM

## 2025-08-19 DIAGNOSIS — Z3A.33 33 WEEKS GESTATION OF PREGNANCY (HHS-HCC): ICD-10-CM

## 2025-08-19 DIAGNOSIS — Z79.4 TYPE 2 DIABETES MELLITUS WITH STAGE 1 CHRONIC KIDNEY DISEASE, WITH LONG-TERM CURRENT USE OF INSULIN (MULTI): Primary | ICD-10-CM

## 2025-08-19 DIAGNOSIS — N18.1 TYPE 2 DIABETES MELLITUS WITH STAGE 1 CHRONIC KIDNEY DISEASE, WITH LONG-TERM CURRENT USE OF INSULIN (MULTI): Primary | ICD-10-CM

## 2025-08-19 DIAGNOSIS — A74.9 CHLAMYDIA INFECTION AFFECTING PREGNANCY IN THIRD TRIMESTER (HHS-HCC): ICD-10-CM

## 2025-08-19 LAB — GLUCOSE BLD MANUAL STRIP-MCNC: 74 MG/DL (ref 74–99)

## 2025-08-19 PROCEDURE — 76815 OB US LIMITED FETUS(S): CPT | Performed by: OBSTETRICS & GYNECOLOGY

## 2025-08-19 PROCEDURE — 76819 FETAL BIOPHYS PROFIL W/O NST: CPT

## 2025-08-19 PROCEDURE — 76815 OB US LIMITED FETUS(S): CPT

## 2025-08-19 PROCEDURE — 95251 CONT GLUC MNTR ANALYSIS I&R: CPT | Performed by: OBSTETRICS & GYNECOLOGY

## 2025-08-19 PROCEDURE — 76819 FETAL BIOPHYS PROFIL W/O NST: CPT | Performed by: OBSTETRICS & GYNECOLOGY

## 2025-08-19 PROCEDURE — 99213 OFFICE O/P EST LOW 20 MIN: CPT | Mod: 25 | Performed by: OBSTETRICS & GYNECOLOGY

## 2025-08-19 PROCEDURE — 99214 OFFICE O/P EST MOD 30 MIN: CPT | Performed by: OBSTETRICS & GYNECOLOGY

## 2025-08-19 PROCEDURE — 82947 ASSAY GLUCOSE BLOOD QUANT: CPT | Performed by: OBSTETRICS & GYNECOLOGY

## 2025-08-19 ASSESSMENT — PAIN SCALES - GENERAL: PAINLEVEL_OUTOF10: 0 - NO PAIN

## 2025-08-19 ASSESSMENT — PATIENT HEALTH QUESTIONNAIRE - PHQ9
2. FEELING DOWN, DEPRESSED OR HOPELESS: NOT AT ALL
1. LITTLE INTEREST OR PLEASURE IN DOING THINGS: NOT AT ALL
SUM OF ALL RESPONSES TO PHQ9 QUESTIONS 1 AND 2: 0

## 2025-08-19 ASSESSMENT — PAIN - FUNCTIONAL ASSESSMENT: PAIN_FUNCTIONAL_ASSESSMENT: 0-10

## 2025-08-20 ENCOUNTER — APPOINTMENT (OUTPATIENT)
Dept: OBSTETRICS AND GYNECOLOGY | Facility: CLINIC | Age: 25
End: 2025-08-20
Payer: MEDICARE

## 2025-08-20 ENCOUNTER — APPOINTMENT (OUTPATIENT)
Dept: RADIOLOGY | Facility: CLINIC | Age: 25
End: 2025-08-20
Payer: MEDICARE

## 2025-08-22 ENCOUNTER — PROCEDURE VISIT (OUTPATIENT)
Dept: OBSTETRICS AND GYNECOLOGY | Facility: CLINIC | Age: 25
End: 2025-08-22
Payer: MEDICARE

## 2025-08-22 VITALS
DIASTOLIC BLOOD PRESSURE: 84 MMHG | SYSTOLIC BLOOD PRESSURE: 137 MMHG | BODY MASS INDEX: 32.85 KG/M2 | WEIGHT: 220 LBS | HEART RATE: 94 BPM

## 2025-08-22 DIAGNOSIS — Z79.4 TYPE 2 DIABETES MELLITUS WITH STAGE 1 CHRONIC KIDNEY DISEASE, WITH LONG-TERM CURRENT USE OF INSULIN (MULTI): ICD-10-CM

## 2025-08-22 DIAGNOSIS — E11.22 TYPE 2 DIABETES MELLITUS WITH STAGE 1 CHRONIC KIDNEY DISEASE, WITH LONG-TERM CURRENT USE OF INSULIN (MULTI): ICD-10-CM

## 2025-08-22 DIAGNOSIS — N18.1 TYPE 2 DIABETES MELLITUS WITH STAGE 1 CHRONIC KIDNEY DISEASE, WITH LONG-TERM CURRENT USE OF INSULIN (MULTI): ICD-10-CM

## 2025-08-22 DIAGNOSIS — O10.919 CHRONIC HYPERTENSION IN PREGNANCY (HHS-HCC): Primary | ICD-10-CM

## 2025-08-22 LAB — GP B STREP SPEC QL CULT: NORMAL

## 2025-08-22 PROCEDURE — RXMED WILLOW AMBULATORY MEDICATION CHARGE

## 2025-08-22 PROCEDURE — 59025 FETAL NON-STRESS TEST: CPT | Performed by: STUDENT IN AN ORGANIZED HEALTH CARE EDUCATION/TRAINING PROGRAM

## 2025-08-25 ENCOUNTER — PHARMACY VISIT (OUTPATIENT)
Dept: PHARMACY | Facility: CLINIC | Age: 25
End: 2025-08-25
Payer: COMMERCIAL

## 2025-08-26 ENCOUNTER — ROUTINE PRENATAL (OUTPATIENT)
Dept: MATERNAL FETAL MEDICINE | Facility: CLINIC | Age: 25
End: 2025-08-26
Payer: MEDICARE

## 2025-08-26 ENCOUNTER — HOSPITAL ENCOUNTER (OUTPATIENT)
Dept: RADIOLOGY | Facility: CLINIC | Age: 25
Discharge: HOME | End: 2025-08-26
Payer: MEDICARE

## 2025-08-26 VITALS
BODY MASS INDEX: 32.41 KG/M2 | HEART RATE: 114 BPM | DIASTOLIC BLOOD PRESSURE: 74 MMHG | SYSTOLIC BLOOD PRESSURE: 118 MMHG | WEIGHT: 217.06 LBS

## 2025-08-26 DIAGNOSIS — N18.1 TYPE 2 DIABETES MELLITUS WITH STAGE 1 CHRONIC KIDNEY DISEASE, WITH LONG-TERM CURRENT USE OF INSULIN (MULTI): ICD-10-CM

## 2025-08-26 DIAGNOSIS — Z79.4 TYPE 2 DIABETES MELLITUS WITH STAGE 1 CHRONIC KIDNEY DISEASE, WITH LONG-TERM CURRENT USE OF INSULIN (MULTI): ICD-10-CM

## 2025-08-26 DIAGNOSIS — O98.813 CHLAMYDIA INFECTION AFFECTING PREGNANCY IN THIRD TRIMESTER (HHS-HCC): ICD-10-CM

## 2025-08-26 DIAGNOSIS — Z3A.32 32 WEEKS GESTATION OF PREGNANCY (HHS-HCC): ICD-10-CM

## 2025-08-26 DIAGNOSIS — E11.22 TYPE 2 DIABETES MELLITUS WITH STAGE 1 CHRONIC KIDNEY DISEASE, WITH LONG-TERM CURRENT USE OF INSULIN (MULTI): ICD-10-CM

## 2025-08-26 DIAGNOSIS — Z28.39 RUBELLA NON-IMMUNE STATUS, ANTEPARTUM (HHS-HCC): ICD-10-CM

## 2025-08-26 DIAGNOSIS — O10.919 CHRONIC HYPERTENSION IN PREGNANCY (HHS-HCC): ICD-10-CM

## 2025-08-26 DIAGNOSIS — A74.9 CHLAMYDIA INFECTION AFFECTING PREGNANCY IN THIRD TRIMESTER (HHS-HCC): ICD-10-CM

## 2025-08-26 DIAGNOSIS — O09.899 RUBELLA NON-IMMUNE STATUS, ANTEPARTUM (HHS-HCC): ICD-10-CM

## 2025-08-26 DIAGNOSIS — Z3A.34 34 WEEKS GESTATION OF PREGNANCY (HHS-HCC): Primary | ICD-10-CM

## 2025-08-26 LAB — GLUCOSE BLD MANUAL STRIP-MCNC: 267 MG/DL (ref 74–99)

## 2025-08-26 PROCEDURE — 76815 OB US LIMITED FETUS(S): CPT

## 2025-08-26 PROCEDURE — RXMED WILLOW AMBULATORY MEDICATION CHARGE

## 2025-08-26 PROCEDURE — 76815 OB US LIMITED FETUS(S): CPT | Performed by: OBSTETRICS & GYNECOLOGY

## 2025-08-26 PROCEDURE — 99214 OFFICE O/P EST MOD 30 MIN: CPT | Mod: 25 | Performed by: OBSTETRICS & GYNECOLOGY

## 2025-08-26 PROCEDURE — 99214 OFFICE O/P EST MOD 30 MIN: CPT | Performed by: OBSTETRICS & GYNECOLOGY

## 2025-08-26 PROCEDURE — 82947 ASSAY GLUCOSE BLOOD QUANT: CPT | Performed by: OBSTETRICS & GYNECOLOGY

## 2025-08-26 PROCEDURE — 76819 FETAL BIOPHYS PROFIL W/O NST: CPT | Performed by: OBSTETRICS & GYNECOLOGY

## 2025-08-26 PROCEDURE — 76819 FETAL BIOPHYS PROFIL W/O NST: CPT

## 2025-08-26 ASSESSMENT — PAIN - FUNCTIONAL ASSESSMENT: PAIN_FUNCTIONAL_ASSESSMENT: 0-10

## 2025-08-26 ASSESSMENT — PAIN SCALES - GENERAL: PAINLEVEL_OUTOF10: 0 - NO PAIN

## 2025-08-27 ENCOUNTER — APPOINTMENT (OUTPATIENT)
Dept: OBSTETRICS AND GYNECOLOGY | Facility: CLINIC | Age: 25
End: 2025-08-27
Payer: MEDICARE

## 2025-08-28 ENCOUNTER — PHARMACY VISIT (OUTPATIENT)
Dept: PHARMACY | Facility: CLINIC | Age: 25
End: 2025-08-28
Payer: COMMERCIAL

## 2025-08-29 ENCOUNTER — PROCEDURE VISIT (OUTPATIENT)
Dept: OBSTETRICS AND GYNECOLOGY | Facility: CLINIC | Age: 25
End: 2025-08-29
Payer: MEDICARE

## 2025-08-29 VITALS
BODY MASS INDEX: 33.04 KG/M2 | DIASTOLIC BLOOD PRESSURE: 80 MMHG | HEART RATE: 94 BPM | WEIGHT: 221.3 LBS | SYSTOLIC BLOOD PRESSURE: 120 MMHG

## 2025-08-29 DIAGNOSIS — O10.919 CHRONIC HYPERTENSION IN PREGNANCY (HHS-HCC): Primary | ICD-10-CM

## 2025-08-29 PROCEDURE — 59025 FETAL NON-STRESS TEST: CPT | Performed by: OBSTETRICS & GYNECOLOGY

## 2025-09-01 PROBLEM — Z3A.35 35 WEEKS GESTATION OF PREGNANCY (HHS-HCC): Status: ACTIVE | Noted: 2025-02-11

## 2025-09-02 ENCOUNTER — ROUTINE PRENATAL (OUTPATIENT)
Dept: MATERNAL FETAL MEDICINE | Facility: CLINIC | Age: 25
End: 2025-09-02
Payer: MEDICARE

## 2025-09-02 ENCOUNTER — HOSPITAL ENCOUNTER (OUTPATIENT)
Dept: RADIOLOGY | Facility: CLINIC | Age: 25
Discharge: HOME | End: 2025-09-02
Payer: MEDICARE

## 2025-09-02 VITALS
BODY MASS INDEX: 33.33 KG/M2 | DIASTOLIC BLOOD PRESSURE: 75 MMHG | SYSTOLIC BLOOD PRESSURE: 119 MMHG | WEIGHT: 223.25 LBS | HEART RATE: 78 BPM

## 2025-09-02 DIAGNOSIS — O98.813 CHLAMYDIA INFECTION AFFECTING PREGNANCY IN THIRD TRIMESTER (HHS-HCC): ICD-10-CM

## 2025-09-02 DIAGNOSIS — E11.22 TYPE 2 DIABETES MELLITUS WITH STAGE 1 CHRONIC KIDNEY DISEASE, WITH LONG-TERM CURRENT USE OF INSULIN (MULTI): ICD-10-CM

## 2025-09-02 DIAGNOSIS — N18.1 TYPE 2 DIABETES MELLITUS WITH STAGE 1 CHRONIC KIDNEY DISEASE, WITH LONG-TERM CURRENT USE OF INSULIN (MULTI): ICD-10-CM

## 2025-09-02 DIAGNOSIS — Z3A.35 35 WEEKS GESTATION OF PREGNANCY (HHS-HCC): Primary | ICD-10-CM

## 2025-09-02 DIAGNOSIS — A74.9 CHLAMYDIA INFECTION AFFECTING PREGNANCY IN THIRD TRIMESTER (HHS-HCC): ICD-10-CM

## 2025-09-02 DIAGNOSIS — Z79.4 TYPE 2 DIABETES MELLITUS WITH STAGE 1 CHRONIC KIDNEY DISEASE, WITH LONG-TERM CURRENT USE OF INSULIN (MULTI): ICD-10-CM

## 2025-09-02 DIAGNOSIS — O10.919 CHRONIC HYPERTENSION IN PREGNANCY (HHS-HCC): ICD-10-CM

## 2025-09-02 DIAGNOSIS — Z3A.32 32 WEEKS GESTATION OF PREGNANCY (HHS-HCC): ICD-10-CM

## 2025-09-02 DIAGNOSIS — O09.899 RUBELLA NON-IMMUNE STATUS, ANTEPARTUM (HHS-HCC): ICD-10-CM

## 2025-09-02 DIAGNOSIS — Z28.39 RUBELLA NON-IMMUNE STATUS, ANTEPARTUM (HHS-HCC): ICD-10-CM

## 2025-09-02 LAB — GLUCOSE BLD MANUAL STRIP-MCNC: 114 MG/DL (ref 74–99)

## 2025-09-02 PROCEDURE — 76816 OB US FOLLOW-UP PER FETUS: CPT

## 2025-09-02 PROCEDURE — 99213 OFFICE O/P EST LOW 20 MIN: CPT | Mod: 25 | Performed by: OBSTETRICS & GYNECOLOGY

## 2025-09-02 PROCEDURE — 76819 FETAL BIOPHYS PROFIL W/O NST: CPT | Performed by: STUDENT IN AN ORGANIZED HEALTH CARE EDUCATION/TRAINING PROGRAM

## 2025-09-02 PROCEDURE — 76819 FETAL BIOPHYS PROFIL W/O NST: CPT

## 2025-09-02 PROCEDURE — 99214 OFFICE O/P EST MOD 30 MIN: CPT | Performed by: OBSTETRICS & GYNECOLOGY

## 2025-09-02 PROCEDURE — 76816 OB US FOLLOW-UP PER FETUS: CPT | Performed by: STUDENT IN AN ORGANIZED HEALTH CARE EDUCATION/TRAINING PROGRAM

## 2025-09-02 PROCEDURE — 82947 ASSAY GLUCOSE BLOOD QUANT: CPT | Performed by: OBSTETRICS & GYNECOLOGY

## 2025-09-02 ASSESSMENT — PAIN - FUNCTIONAL ASSESSMENT: PAIN_FUNCTIONAL_ASSESSMENT: 0-10

## 2025-09-02 ASSESSMENT — PAIN SCALES - GENERAL: PAINLEVEL_OUTOF10: 0 - NO PAIN

## 2025-09-03 ENCOUNTER — APPOINTMENT (OUTPATIENT)
Dept: OBSTETRICS AND GYNECOLOGY | Facility: CLINIC | Age: 25
End: 2025-09-03
Payer: MEDICARE

## 2025-09-05 ENCOUNTER — APPOINTMENT (OUTPATIENT)
Dept: OBSTETRICS AND GYNECOLOGY | Facility: CLINIC | Age: 25
End: 2025-09-05
Payer: MEDICARE

## 2026-02-18 ENCOUNTER — APPOINTMENT (OUTPATIENT)
Dept: ENDOCRINOLOGY | Facility: CLINIC | Age: 26
End: 2026-02-18
Payer: MEDICARE

## (undated) DEVICE — Device

## (undated) DEVICE — DRAPE, PAD, PREP, W/ 9 IN CUFF, 24 X 41, LF, NS

## (undated) DEVICE — DRESSING, ABDOMINAL, TENDERSORB, 8 X 10 IN, STERILE

## (undated) DEVICE — PREP TRAY, SKIN, DRY, W/GLOVES

## (undated) DEVICE — DRAPE, SHEET, FAN FOLDED, HALF, 44 X 58 IN, DISPOSABLE, LF, STERILE

## (undated) DEVICE — MANIFOLD, 4 PORT NEPTUNE STANDARD

## (undated) DEVICE — COVER, CART, 45 X 27 X 48 IN, CLEAR

## (undated) DEVICE — GOWN, SURGICAL, SMARTGOWN, XLARGE, STERILE